# Patient Record
Sex: FEMALE | Race: WHITE | Employment: FULL TIME | URBAN - METROPOLITAN AREA
[De-identification: names, ages, dates, MRNs, and addresses within clinical notes are randomized per-mention and may not be internally consistent; named-entity substitution may affect disease eponyms.]

---

## 2015-02-26 LAB — PAP SMEAR - HIM PATIENT REPORTED: NEGATIVE

## 2017-01-31 ENCOUNTER — OFFICE VISIT (OUTPATIENT)
Dept: TRANSPLANT | Facility: CLINIC | Age: 52
End: 2017-01-31
Attending: INTERNAL MEDICINE
Payer: COMMERCIAL

## 2017-01-31 VITALS
HEART RATE: 56 BPM | RESPIRATION RATE: 18 BRPM | TEMPERATURE: 98.1 F | SYSTOLIC BLOOD PRESSURE: 129 MMHG | WEIGHT: 140 LBS | DIASTOLIC BLOOD PRESSURE: 81 MMHG | BODY MASS INDEX: 24.02 KG/M2 | OXYGEN SATURATION: 98 %

## 2017-01-31 DIAGNOSIS — D50.0 IRON DEFICIENCY ANEMIA DUE TO CHRONIC BLOOD LOSS: Primary | ICD-10-CM

## 2017-01-31 DIAGNOSIS — E55.9 VITAMIN D DEFICIENCY: ICD-10-CM

## 2017-01-31 DIAGNOSIS — D50.0 IRON DEFICIENCY ANEMIA DUE TO CHRONIC BLOOD LOSS: ICD-10-CM

## 2017-01-31 LAB
ALBUMIN SERPL-MCNC: 4.1 G/DL (ref 3.4–5)
ALP SERPL-CCNC: 73 U/L (ref 40–150)
ALT SERPL W P-5'-P-CCNC: 20 U/L (ref 0–50)
ANION GAP SERPL CALCULATED.3IONS-SCNC: 9 MMOL/L (ref 3–14)
AST SERPL W P-5'-P-CCNC: 10 U/L (ref 0–45)
BASOPHILS # BLD AUTO: 0 10E9/L (ref 0–0.2)
BASOPHILS NFR BLD AUTO: 0.4 %
BILIRUB SERPL-MCNC: 0.3 MG/DL (ref 0.2–1.3)
BUN SERPL-MCNC: 14 MG/DL (ref 7–30)
CALCIUM SERPL-MCNC: 8.5 MG/DL (ref 8.5–10.1)
CHLORIDE SERPL-SCNC: 106 MMOL/L (ref 94–109)
CO2 SERPL-SCNC: 25 MMOL/L (ref 20–32)
CREAT SERPL-MCNC: 0.62 MG/DL (ref 0.52–1.04)
DIFFERENTIAL METHOD BLD: NORMAL
EOSINOPHIL # BLD AUTO: 0.4 10E9/L (ref 0–0.7)
EOSINOPHIL NFR BLD AUTO: 7.1 %
ERYTHROCYTE [DISTWIDTH] IN BLOOD BY AUTOMATED COUNT: 12.9 % (ref 10–15)
FERRITIN SERPL-MCNC: 38 NG/ML (ref 8–252)
GFR SERPL CREATININE-BSD FRML MDRD: NORMAL ML/MIN/1.7M2
GLUCOSE SERPL-MCNC: 77 MG/DL (ref 70–99)
HCT VFR BLD AUTO: 40.4 % (ref 35–47)
HGB BLD-MCNC: 13.4 G/DL (ref 11.7–15.7)
IMM GRANULOCYTES # BLD: 0 10E9/L (ref 0–0.4)
IMM GRANULOCYTES NFR BLD: 0.2 %
IRON SATN MFR SERPL: 23 % (ref 15–46)
IRON SERPL-MCNC: 71 UG/DL (ref 35–180)
LYMPHOCYTES # BLD AUTO: 1.7 10E9/L (ref 0.8–5.3)
LYMPHOCYTES NFR BLD AUTO: 31.3 %
MCH RBC QN AUTO: 31.3 PG (ref 26.5–33)
MCHC RBC AUTO-ENTMCNC: 33.2 G/DL (ref 31.5–36.5)
MCV RBC AUTO: 94 FL (ref 78–100)
MONOCYTES # BLD AUTO: 0.4 10E9/L (ref 0–1.3)
MONOCYTES NFR BLD AUTO: 6.9 %
NEUTROPHILS # BLD AUTO: 3 10E9/L (ref 1.6–8.3)
NEUTROPHILS NFR BLD AUTO: 54.1 %
NRBC # BLD AUTO: 0 10*3/UL
NRBC BLD AUTO-RTO: 0 /100
PLATELET # BLD AUTO: 306 10E9/L (ref 150–450)
POTASSIUM SERPL-SCNC: 4 MMOL/L (ref 3.4–5.3)
PROT SERPL-MCNC: 7.4 G/DL (ref 6.8–8.8)
RBC # BLD AUTO: 4.28 10E12/L (ref 3.8–5.2)
SODIUM SERPL-SCNC: 140 MMOL/L (ref 133–144)
TIBC SERPL-MCNC: 303 UG/DL (ref 240–430)
TSH SERPL DL<=0.05 MIU/L-ACNC: 0.79 MU/L (ref 0.4–4)
WBC # BLD AUTO: 5.5 10E9/L (ref 4–11)

## 2017-01-31 PROCEDURE — 99213 OFFICE O/P EST LOW 20 MIN: CPT | Mod: ZF

## 2017-01-31 PROCEDURE — 82306 VITAMIN D 25 HYDROXY: CPT | Performed by: INTERNAL MEDICINE

## 2017-01-31 PROCEDURE — 82728 ASSAY OF FERRITIN: CPT | Performed by: INTERNAL MEDICINE

## 2017-01-31 PROCEDURE — 83540 ASSAY OF IRON: CPT | Performed by: INTERNAL MEDICINE

## 2017-01-31 PROCEDURE — 84443 ASSAY THYROID STIM HORMONE: CPT | Performed by: INTERNAL MEDICINE

## 2017-01-31 PROCEDURE — 80053 COMPREHEN METABOLIC PANEL: CPT | Performed by: INTERNAL MEDICINE

## 2017-01-31 PROCEDURE — 36415 COLL VENOUS BLD VENIPUNCTURE: CPT

## 2017-01-31 PROCEDURE — 83550 IRON BINDING TEST: CPT | Performed by: INTERNAL MEDICINE

## 2017-01-31 PROCEDURE — 85025 COMPLETE CBC W/AUTO DIFF WBC: CPT | Performed by: INTERNAL MEDICINE

## 2017-01-31 RX ORDER — METOPROLOL SUCCINATE 25 MG/1
TABLET, EXTENDED RELEASE ORAL
Refills: 0 | COMMUNITY
Start: 2016-11-23

## 2017-01-31 RX ORDER — ESCITALOPRAM OXALATE 5 MG/1
5 TABLET ORAL
COMMUNITY
End: 2021-03-12

## 2017-01-31 ASSESSMENT — PAIN SCALES - GENERAL: PAINLEVEL: NO PAIN (0)

## 2017-01-31 NOTE — PROGRESS NOTES
HISTORY OF PRESENT ILLNESS:  Ming returns to the Hematology Clinic for evaluation of refractory iron deficiency.She last received Ferrlecit iron infusions in Jan/Feb 2016 4 infusions of 125mg each.  She received it at [facility].  Last period was January 2016   She has some fatigue, no ice eating,no brittle nails and no nausea, vomiting or diarrhea. She had an ultrasound of the thyroid which showed multiple small nodules unchanged from previously Of note she did have possible radiation exposure to [place]. Now taking metoprolol 25mg/day. No change in her alopecia. Scalp .Skin is dry Not taking Vitamin D. Now taking lipitor 10mg/day.Has had some muscle pain and stopped lipitor which improved the muscle pain  Unfortunately her mother has been diagnosed with mesothelioma.She will be seeing Dr Tubbs     PAST MEDICAL HISTORY:  See my note from 06/2016.       SOCIAL HISTORY:  See my note from 06/2016.       FAMILY HISTORY:  See my note from 06/2016.       REVIEW OF SYSTEMS:  A 12-point review of systems is done and is negative as in HPI.      PHYSICAL EXAMINATION:   GENERAL:  She is an alert woman in no acute distress.   VITAL SIGNS:  Stable./81 mmHg  Pulse 56  Temp(Src) 98.1  F (36.7  C) (Oral)  Resp 18  Wt 63.504 kg (140 lb)  SpO2 98%    HEENT:  Normocephalic.  EOM okay.  No scleral icterus.  Mouth without lesion.  No angular cheilosis. Scalp not red. Part width 2mm  NECK  Thyroid feels bumpy  RESPIRATORY:  Clear to percussion and auscultation.   CARDIAC:  No S3,S4 or M  ABDOMEN:  Soft without hepatosplenomegaly.   EXTREMITIES:  Without edema.     Results for MING HOWARD (MRN [id number]) as of 2/1/2017 09:52   Ref. Range 1/31/2017 14:16   Sodium Latest Ref Range: 133-144 mmol/L 140   Potassium Latest Ref Range: 3.4-5.3 mmol/L 4.0   Chloride Latest Ref Range:  mmol/L 106   Carbon Dioxide Latest Ref Range: 20-32 mmol/L 25   Urea Nitrogen Latest Ref Range: 7-30 mg/dL 14   Creatinine  Latest Ref Range: 0.52-1.04 mg/dL 0.62   GFR Estimate Latest Ref Range: >60 mL/min/1.7m2 >90...   GFR Estimate If Black Latest Ref Range: >60 mL/min/1.7m2 >90...   Calcium Latest Ref Range: 8.5-10.1 mg/dL 8.5   Anion Gap Latest Ref Range: 3-14 mmol/L 9   Albumin Latest Ref Range: 3.4-5.0 g/dL 4.1   Protein Total Latest Ref Range: 6.8-8.8 g/dL 7.4   Bilirubin Total Latest Ref Range: 0.2-1.3 mg/dL 0.3   Alkaline Phosphatase Latest Ref Range:  U/L 73   ALT Latest Ref Range: 0-50 U/L 20   AST Latest Ref Range: 0-45 U/L 10   Ferritin Latest Ref Range: 8-252 ng/mL 38   Iron Latest Ref Range:  ug/dL 71   Iron Binding Cap Latest Ref Range: 240-430 ug/dL 303   Iron Saturation Index Latest Ref Range: 15-46 % 23   TSH Latest Ref Range: 0.40-4.00 mU/L 0.79   Glucose Latest Ref Range: 70-99 mg/dL 77   WBC Latest Ref Range: 4.0-11.0 10e9/L 5.5   Hemoglobin Latest Ref Range: 11.7-15.7 g/dL 13.4   Hematocrit Latest Ref Range: 35.0-47.0 % 40.4   Platelet Count Latest Ref Range: 150-450 10e9/L 306   RBC Count Latest Ref Range: 3.8-5.2 10e12/L 4.28   MCV Latest Ref Range:  fl 94   MCH Latest Ref Range: 26.5-33.0 pg 31.3   MCHC Latest Ref Range: 31.5-36.5 g/dL 33.2   RDW Latest Ref Range: 10.0-15.0 % 12.9   Diff Method Unknown Automated Method   % Neutrophils Latest Units: % 54.1   % Lymphocytes Latest Units: % 31.3   % Monocytes Latest Units: % 6.9   % Eosinophils Latest Units: % 7.1   % Basophils Latest Units: % 0.4   % Immature Granulocytes Latest Units: % 0.2   Nucleated RBCs Latest Ref Range: 0 /100 0   Absolute Neutrophil Latest Ref Range: 1.6-8.3 10e9/L 3.0   Absolute Lymphocytes Latest Ref Range: 0.8-5.3 10e9/L 1.7   Absolute Monocytes Latest Ref Range: 0.0-1.3 10e9/L 0.4   Absolute Eosinophils Latest Ref Range: 0.0-0.7 10e9/L 0.4   Absolute Basophils Latest Ref Range: 0.0-0.2 10e9/L 0.0   Abs Immature Granulocytes Latest Ref Range: 0-0.4 10e9/L 0.0   Absolute Nucleated RBC Unknown 0.0         ASSESSMENT:   1.   Iron deficiency.   2.  History of hyperlipidemia.   3.  History of radiation exposure.   4.  History of menorrhagia.   5.  Vitamin D deficiency.     6. Thyroid nodules  7.Empty sella syndrome     Carolyn seems to be doing very well. Tolerated iron infusions well.   Her hemoglobin looks normal.  Her MCV is normal. Ferritin is 38 .No need for iron infusion now  Will follow up with endocrine for thyroid evaluation. I will see her again in 6 months' time.  Alopecia seems stable I will see her again in 07/2017. Vitamin D still low, needs to continue replacement Should take lipitor and f/u cardiologist

## 2017-01-31 NOTE — NURSING NOTE
Chief Complaint   Patient presents with     Blood Draw     labs collected by RN from left antecubital, pt tolerated well. Vitals taken and pt checked in for next appt.     Pauline Logan

## 2017-01-31 NOTE — Clinical Note
1/31/2017      RE: Ming Briones  [address] [place] [address]       HISTORY OF PRESENT ILLNESS:  Ming returns to the Hematology Clinic for evaluation of refractory iron deficiency.She last received Ferrlecit iron infusions in Jan/Feb 2016 4 infusions of 125mg each.  She received it at [facility].  Last period was January 2016   She has some fatigue, no ice eating,no brittle nails and no nausea, vomiting or diarrhea. She had an ultrasound of the thyroid which showed multiple small nodules unchanged from previously Of note she did have possible radiation exposure to [place]. Now taking metoprolol 25mg/day. No change in her alopecia. Scalp .Skin is dry Not taking Vitamin D. Now taking lipitor 10mg/day.Has had some muscle pain and stopped lipitor which improved the muscle pain  Unfortunately her mother has been diagnosed with mesothelioma.She will be seeing Dr Tubbs     PAST MEDICAL HISTORY:  See my note from 06/2016.       SOCIAL HISTORY:  See my note from 06/2016.       FAMILY HISTORY:  See my note from 06/2016.       REVIEW OF SYSTEMS:  A 12-point review of systems is done and is negative as in HPI.      PHYSICAL EXAMINATION:   GENERAL:  She is an alert woman in no acute distress.   VITAL SIGNS:  Stable./81 mmHg  Pulse 56  Temp(Src) 98.1  F (36.7  C) (Oral)  Resp 18  Wt 63.504 kg (140 lb)  SpO2 98%    HEENT:  Normocephalic.  EOM okay.  No scleral icterus.  Mouth without lesion.  No angular cheilosis. Scalp not red. Part width 2mm  NECK  Thyroid feels bumpy  RESPIRATORY:  Clear to percussion and auscultation.   CARDIAC:  No S3,S4 or M  ABDOMEN:  Soft without hepatosplenomegaly.   EXTREMITIES:  Without edema.     Results for MING BRIONES (MRN [id number]) as of 2/1/2017 09:52   Ref. Range 1/31/2017 14:16   Sodium Latest Ref Range: 133-144 mmol/L 140   Potassium Latest Ref Range: 3.4-5.3 mmol/L 4.0   Chloride Latest Ref Range:  mmol/L 106   Carbon Dioxide Latest Ref  Range: 20-32 mmol/L 25   Urea Nitrogen Latest Ref Range: 7-30 mg/dL 14   Creatinine Latest Ref Range: 0.52-1.04 mg/dL 0.62   GFR Estimate Latest Ref Range: >60 mL/min/1.7m2 >90...   GFR Estimate If Black Latest Ref Range: >60 mL/min/1.7m2 >90...   Calcium Latest Ref Range: 8.5-10.1 mg/dL 8.5   Anion Gap Latest Ref Range: 3-14 mmol/L 9   Albumin Latest Ref Range: 3.4-5.0 g/dL 4.1   Protein Total Latest Ref Range: 6.8-8.8 g/dL 7.4   Bilirubin Total Latest Ref Range: 0.2-1.3 mg/dL 0.3   Alkaline Phosphatase Latest Ref Range:  U/L 73   ALT Latest Ref Range: 0-50 U/L 20   AST Latest Ref Range: 0-45 U/L 10   Ferritin Latest Ref Range: 8-252 ng/mL 38   Iron Latest Ref Range:  ug/dL 71   Iron Binding Cap Latest Ref Range: 240-430 ug/dL 303   Iron Saturation Index Latest Ref Range: 15-46 % 23   TSH Latest Ref Range: 0.40-4.00 mU/L 0.79   Glucose Latest Ref Range: 70-99 mg/dL 77   WBC Latest Ref Range: 4.0-11.0 10e9/L 5.5   Hemoglobin Latest Ref Range: 11.7-15.7 g/dL 13.4   Hematocrit Latest Ref Range: 35.0-47.0 % 40.4   Platelet Count Latest Ref Range: 150-450 10e9/L 306   RBC Count Latest Ref Range: 3.8-5.2 10e12/L 4.28   MCV Latest Ref Range:  fl 94   MCH Latest Ref Range: 26.5-33.0 pg 31.3   MCHC Latest Ref Range: 31.5-36.5 g/dL 33.2   RDW Latest Ref Range: 10.0-15.0 % 12.9   Diff Method Unknown Automated Method   % Neutrophils Latest Units: % 54.1   % Lymphocytes Latest Units: % 31.3   % Monocytes Latest Units: % 6.9   % Eosinophils Latest Units: % 7.1   % Basophils Latest Units: % 0.4   % Immature Granulocytes Latest Units: % 0.2   Nucleated RBCs Latest Ref Range: 0 /100 0   Absolute Neutrophil Latest Ref Range: 1.6-8.3 10e9/L 3.0   Absolute Lymphocytes Latest Ref Range: 0.8-5.3 10e9/L 1.7   Absolute Monocytes Latest Ref Range: 0.0-1.3 10e9/L 0.4   Absolute Eosinophils Latest Ref Range: 0.0-0.7 10e9/L 0.4   Absolute Basophils Latest Ref Range: 0.0-0.2 10e9/L 0.0   Abs Immature Granulocytes Latest Ref  Range: 0-0.4 10e9/L 0.0   Absolute Nucleated RBC Unknown 0.0         ASSESSMENT:   1.  Iron deficiency.   2.  History of hyperlipidemia.   3.  History of radiation exposure.   4.  History of menorrhagia.   5.  Vitamin D deficiency.     6. Thyroid nodules  7.Empty sella syndrome     Carolyn seems to be doing very well. Tolerated iron infusions well.   Her hemoglobin looks normal.  Her MCV is normal. Ferritin is 38 .No need for iron infusion now  Will follow up with endocrine for thyroid evaluation. I will see her again in 6 months' time.  Alopecia seems stable I will see her again in 07/2017. Vitamin D still low, needs to continue replacement Should take lipitor and f/u cardiologist      Wilfredo Ruggiero MD

## 2017-01-31 NOTE — Clinical Note
1/31/2017       RE: Ming Briones  [address] [place] [address]     Dear Colleague,    Thank you for referring your patient, Ming Briones, to the [facility] BLOOD AND MARROW TRANSPLANT at [facility]. Please see a copy of my visit note below.    HISTORY OF PRESENT ILLNESS:  Ming returns to the Hematology Clinic for evaluation of refractory iron deficiency.She last received Ferrlecit iron infusions in Jan/Feb 2016 4 infusions of 125mg each.  She received it at [facility].  Last period was January 2016   She has some fatigue, no ice eating,no brittle nails and no nausea, vomiting or diarrhea. She had an ultrasound of the thyroid which showed multiple small nodules unchanged from previously Of note she did have possible radiation exposure to [place]. Now taking metoprolol 25mg/day. No change in her alopecia. Scalp .Skin is dry Not taking Vitamin D. Now taking lipitor 10mg/day.Has had some muscle pain and stopped lipitor which improved the muscle pain  Unfortunately her mother has been diagnosed with mesothelioma.She will be seeing Dr Tubbs     PAST MEDICAL HISTORY:  See my note from 06/2016.       SOCIAL HISTORY:  See my note from 06/2016.       FAMILY HISTORY:  See my note from 06/2016.       REVIEW OF SYSTEMS:  A 12-point review of systems is done and is negative as in HPI.      PHYSICAL EXAMINATION:   GENERAL:  She is an alert woman in no acute distress.   VITAL SIGNS:  Stable./81 mmHg  Pulse 56  Temp(Src) 98.1  F (36.7  C) (Oral)  Resp 18  Wt 63.504 kg (140 lb)  SpO2 98%    HEENT:  Normocephalic.  EOM okay.  No scleral icterus.  Mouth without lesion.  No angular cheilosis. Scalp not red. Part width 2mm  NECK  Thyroid feels bumpy  RESPIRATORY:  Clear to percussion and auscultation.   CARDIAC:  No S3,S4 or M  ABDOMEN:  Soft without hepatosplenomegaly.   EXTREMITIES:  Without edema.     Results for MING BRIONES (MRN [id number]) as of  2/1/2017 09:52   Ref. Range 1/31/2017 14:16   Sodium Latest Ref Range: 133-144 mmol/L 140   Potassium Latest Ref Range: 3.4-5.3 mmol/L 4.0   Chloride Latest Ref Range:  mmol/L 106   Carbon Dioxide Latest Ref Range: 20-32 mmol/L 25   Urea Nitrogen Latest Ref Range: 7-30 mg/dL 14   Creatinine Latest Ref Range: 0.52-1.04 mg/dL 0.62   GFR Estimate Latest Ref Range: >60 mL/min/1.7m2 >90...   GFR Estimate If Black Latest Ref Range: >60 mL/min/1.7m2 >90...   Calcium Latest Ref Range: 8.5-10.1 mg/dL 8.5   Anion Gap Latest Ref Range: 3-14 mmol/L 9   Albumin Latest Ref Range: 3.4-5.0 g/dL 4.1   Protein Total Latest Ref Range: 6.8-8.8 g/dL 7.4   Bilirubin Total Latest Ref Range: 0.2-1.3 mg/dL 0.3   Alkaline Phosphatase Latest Ref Range:  U/L 73   ALT Latest Ref Range: 0-50 U/L 20   AST Latest Ref Range: 0-45 U/L 10   Ferritin Latest Ref Range: 8-252 ng/mL 38   Iron Latest Ref Range:  ug/dL 71   Iron Binding Cap Latest Ref Range: 240-430 ug/dL 303   Iron Saturation Index Latest Ref Range: 15-46 % 23   TSH Latest Ref Range: 0.40-4.00 mU/L 0.79   Glucose Latest Ref Range: 70-99 mg/dL 77   WBC Latest Ref Range: 4.0-11.0 10e9/L 5.5   Hemoglobin Latest Ref Range: 11.7-15.7 g/dL 13.4   Hematocrit Latest Ref Range: 35.0-47.0 % 40.4   Platelet Count Latest Ref Range: 150-450 10e9/L 306   RBC Count Latest Ref Range: 3.8-5.2 10e12/L 4.28   MCV Latest Ref Range:  fl 94   MCH Latest Ref Range: 26.5-33.0 pg 31.3   MCHC Latest Ref Range: 31.5-36.5 g/dL 33.2   RDW Latest Ref Range: 10.0-15.0 % 12.9   Diff Method Unknown Automated Method   % Neutrophils Latest Units: % 54.1   % Lymphocytes Latest Units: % 31.3   % Monocytes Latest Units: % 6.9   % Eosinophils Latest Units: % 7.1   % Basophils Latest Units: % 0.4   % Immature Granulocytes Latest Units: % 0.2   Nucleated RBCs Latest Ref Range: 0 /100 0   Absolute Neutrophil Latest Ref Range: 1.6-8.3 10e9/L 3.0   Absolute Lymphocytes Latest Ref Range: 0.8-5.3 10e9/L 1.7    Absolute Monocytes Latest Ref Range: 0.0-1.3 10e9/L 0.4   Absolute Eosinophils Latest Ref Range: 0.0-0.7 10e9/L 0.4   Absolute Basophils Latest Ref Range: 0.0-0.2 10e9/L 0.0   Abs Immature Granulocytes Latest Ref Range: 0-0.4 10e9/L 0.0   Absolute Nucleated RBC Unknown 0.0         ASSESSMENT:   1.  Iron deficiency.   2.  History of hyperlipidemia.   3.  History of radiation exposure.   4.  History of menorrhagia.   5.  Vitamin D deficiency.     6. Thyroid nodules  7.Empty sella syndrome     Carolyn seems to be doing very well. Tolerated iron infusions well.   Her hemoglobin looks normal.  Her MCV is normal. Ferritin is 38 .No need for iron infusion now  Will follow up with endocrine for thyroid evaluation. I will see her again in 6 months' time.  Alopecia seems stable I will see her again in 07/2017. Vitamin D still low, needs to continue replacement Should take lipitor and f/u cardiologist        Again, thank you for allowing me to participate in the care of your patient.      Sincerely,    Wilfredo Ruggiero MD

## 2017-01-31 NOTE — NURSING NOTE
BMT Heme Malignancy Rooming Note    Carolyn Briones - 1/31/2017 2:28 PM     Chief Complaint   Patient presents with     Blood Draw     labs collected by RN from left antecubital, pt tolerated well. Vitals taken and pt checked in for next appt.     RECHECK     Anemia~ here for provider visit        /81 mmHg  Pulse 56  Temp(Src) 98.1  F (36.7  C) (Oral)  Resp 18  Wt 63.504 kg (140 lb)  SpO2 98%     Medications reviewed: Yes    Labs drawn: Yes    Drawn by: Clinic Staff  Via: venipuncture  See Doc Flowsheet for details.      Dressing changed: Not applicable     Medications given: No    Staff time:8    Additional information if applicable: Patient offers no complaints    Vanessa Valenzuela RN

## 2017-02-01 LAB — DEPRECATED CALCIDIOL+CALCIFEROL SERPL-MC: 14 UG/L (ref 20–75)

## 2017-04-25 ENCOUNTER — HOSPITAL ENCOUNTER (OUTPATIENT)
Dept: MAMMOGRAPHY | Facility: CLINIC | Age: 52
Discharge: HOME | End: 2017-04-25
Attending: INTERNAL MEDICINE | Admitting: INTERNAL MEDICINE
Payer: COMMERCIAL

## 2017-04-25 DIAGNOSIS — Z12.31 VISIT FOR SCREENING MAMMOGRAM: ICD-10-CM

## 2017-04-25 PROCEDURE — 77063 BREAST TOMOSYNTHESIS BI: CPT

## 2017-06-09 ENCOUNTER — OFFICE VISIT (OUTPATIENT)
Dept: ENDOCRINOLOGY | Facility: CLINIC | Age: 52
End: 2017-06-09

## 2017-06-09 ENCOUNTER — TELEPHONE (OUTPATIENT)
Dept: ENDOCRINOLOGY | Facility: CLINIC | Age: 52
End: 2017-06-09

## 2017-06-09 VITALS
HEART RATE: 71 BPM | SYSTOLIC BLOOD PRESSURE: 109 MMHG | DIASTOLIC BLOOD PRESSURE: 74 MMHG | HEIGHT: 64 IN | WEIGHT: 146.6 LBS | BODY MASS INDEX: 25.03 KG/M2

## 2017-06-09 DIAGNOSIS — M54.2 NECK PAIN: Primary | ICD-10-CM

## 2017-06-09 DIAGNOSIS — E04.1 THYROID NODULE: ICD-10-CM

## 2017-06-09 DIAGNOSIS — F43.9 STRESS AT HOME: ICD-10-CM

## 2017-06-09 NOTE — TELEPHONE ENCOUNTER
Dada scheduled to see Dr Fink today 11:30 AM She will be  In the building  with her mother for a 11 AM Oncology.
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
## 2017-06-09 NOTE — NURSING NOTE
"Chief Complaint   Patient presents with     RECHECK     THYROID NODULE F/U        Initial /74 (BP Location: Right arm, Patient Position: Chair, Cuff Size: Adult Regular)  Pulse 71  Ht 1.626 m (5' 4\")  Wt 66.5 kg (146 lb 9.6 oz)  BMI 25.16 kg/m2 Estimated body mass index is 25.16 kg/(m^2) as calculated from the following:    Height as of this encounter: 1.626 m (5' 4\").    Weight as of this encounter: 66.5 kg (146 lb 9.6 oz).  Medication Reconciliation: complete       Joanne Carr CMA     "

## 2017-06-09 NOTE — MR AVS SNAPSHOT
After Visit Summary   6/9/2017    Carolyn Briones    MRN: [id number]           Patient Information     Date Of Birth          1965        Visit Information        Provider Department      6/9/2017 11:30 AM Cierra Fink MD [facility]        Care Instructions    To expedite your medication refill(s), please contact your pharmacy and have them fax a refill request to: [phone].  *Please allow 3 business days for routine medication refills.  *Please allow 5 business days for controlled substance medication refills.  --------------------  For scheduling appointments (including lab work), please request an appointment through [employer], or call: [phone].    For questions for your provider or the endocrine nurse, please send a [employer] message.  For after-hours urgent issues, please dial [phone], and ask to speak with the Endocrinologist On-Call.  --------------------  Please Note: If you are active on [employer], all future test results will be sent by [employer] message only and will no longer be sent by mail. You may also receive communication directly from your physician.            Follow-ups after your visit        Follow-up notes from your care team     Return if symptoms worsen or fail to improve.      Your next 10 appointments already scheduled     Aug 01, 2017  1:00 PM CDT   [facility] with  [facility]   [facility] Draw ([facility])    [address] [address] [phone]            Aug 01, 2017  1:30 PM CDT   RETURN ONC with Wilfredo Ruggiero MD   [facility] Blood and Marrow Transplant ([facility])    [address] [address] [phone]              Who to contact     Please call your clinic at [phone] to:    Ask questions about your health    Make or cancel appointments    Discuss your medicines    Learn about  "your test results    Speak to your doctor   If you have compliments or concerns about an experience at your clinic, or if you wish to file a complaint, please contact [facility] Patient Relations at [phone] or email us at [email or web address]         Additional Information About Your Visit        [employer] Information     [employer] gives you secure access to your electronic health record. If you see a primary care provider, you can also send messages to your care team and make appointments. If you have questions, please call your primary care clinic.  If you do not have a primary care provider, please call [phone] and they will assist you.      [employer] is an electronic gateway that provides easy, online access to your medical records. With [employer], you can request a clinic appointment, read your test results, renew a prescription or communicate with your care team.     To access your existing account, please contact your [facility] or call [phone] for assistance.        Care EveryWhere ID     This is your Care EveryWhere ID. This could be used by other organizations to access your [facility] medical records  [id number]        Your Vitals Were     Pulse Height BMI (Body Mass Index)             71 1.626 m (5' 4\") 25.16 kg/m2          Blood Pressure from Last 3 Encounters:   06/09/17 109/74   01/31/17 129/81   10/06/16 105/75    Weight from Last 3 Encounters:   06/09/17 66.5 kg (146 lb 9.6 oz)   01/31/17 63.5 kg (140 lb)   09/27/16 64 kg (141 lb)              Today, you had the following     No orders found for display         Today's Medication Changes          These changes are accurate as of: 6/9/17 12:13 PM.  If you have any questions, ask your nurse or doctor.               Stop taking these medicines if you haven't already. Please contact your care team if you have questions.     metoprolol 25 MG tablet   Commonly known as:  LOPRESSOR "   Stopped by:  Cierra Fink MD                    Primary Care Provider Office Phone # Fax #    Gentry Baer [phone]       [address] [address]        Thank you!     Thank you for choosing [facility]  for your care. Our goal is always to provide you with excellent care. Hearing back from our patients is one way we can continue to improve our services. Please take a few minutes to complete the written survey that you may receive in the mail after your visit with us. Thank you!             Your Updated Medication List - Protect others around you: Learn how to safely use, store and throw away your medicines at www.disposemymeds.org.          This list is accurate as of: 6/9/17 12:13 PM.  Always use your most recent med list.                   Brand Name Dispense Instructions for use    atorvastatin 10 MG tablet    LIPITOR     Take 20 mg by mouth daily       escitalopram 5 MG tablet    LEXAPRO     Take 5 mg by mouth       metoprolol 25 MG 24 hr tablet    TOPROL-XL     take 1/2 tablet by mouth once daily       MULTI-VITAMINS PO      Take  by mouth daily.       PREVACID 30 MG CR capsule   Generic drug:  LANsoprazole      1 CAPSULE DAILY BEFORE EATING

## 2017-06-09 NOTE — PROGRESS NOTES
Assessment   1.   Neck pain intermittent symptoms. I am not finding anything of concern , relative to the thyroid or neck anatomy, to explain the symptoms. Reassurance.      Stress- reassurance    Nodular thyroid in patient with history of possibly important radiation exposures before age 21- stable subcm nodules on past imaging.       History of radiation exposure -[place] exposure while living in [facility] in 1986.    Partially empty sella by imaging. This may be a normal variant.  Pituitary function has repeatedly tested normal.     Hypovitaminosis D 1/17 - not addressed.     Cierra Fink MD    HPI   Carolyn presents for semi-urgent follow up due to recent neck concerns.  We follow her chronically for subcm thyroid nodules . Today she notes concern for intermittent sensations in the neck, very mild, but possibly reminiscent of past FNAB.  She also notes other have asked about her thyroid and she also wondered if it looked larger in the mirror. She is concerned something is going on in the neck. She has no swallow or voice symtpoms.   Carolyn has a history of nodular thyroid with 6-7 mm hypoechoic nodule on the right, with benign cytology by past US guided FNAB  (performed here last 1/12). She has a history of [place] exposure at age 19-20.She was living in [facility]  [place] at the time of the [place] nuclear accident in [place] While [facility] was not directly affected, it was contaminated, including contamination of the water/ soil, etc. She was age 20 and continued to live there another 4.5 years. She has had past thyroid nodule FNAB in 2005 at [place] - benign by history (we do not have the actual cytology report).     We have the following Tg data  2/6/14: Tg 8.1, VIDHI < 0.4, TSH 0.94  9/27/16: Tg 9.9, VIDHI < 0.4, THS 1.29- this followed the appt  She last had TSH 0.79 on 1/31/17.  Vitamin D was 14 then.     2005 Brain MRI images  support the diagnosis of possible partially empty sella.  She last had  formal thyroid US 10/27/16 - I have reviewed the images today on PACS  (see exam)    ROS   Weight is up  Sleep is not OK  Neck sensations reminiscent of FNAB procedure - she has had a few episodes, intermittent  People have asked if she has enlarged thyroid  Palpitations  GI: needs to use the prilosec  LMP 1/16    Family Hx   Family History   Problem Relation Age of Onset     Lipids Mother      C.A.D. Father      CABG age 65     Thyroid Disease No family hx of      Thyroid Cancer No family hx of      Personal Hx   Behavioral history: No tobacco use.   Home environment: No secondhand tobacco smoke in home.   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 [employer]   Past Medical History:   Diagnosis Date     Anemia      Empty sella (H)     only partially empty; possibly normal vaiant     Exposure to other ionizing radiation, subsequent encounter 1986    [place]     GERD (gastroesophageal reflux disease)      Hypercholesterolemia      Hypertension      Iron deficiency      Multiple thyroid nodules     subcm     Past Surgical History:   Procedure Laterality Date     APPENDECTOMY       COLONOSCOPY       DILATION AND CURETTAGE SUCTION  11/13/2012    Procedure: DILATION AND CURETTAGE SUCTION;  Suction Dilation & Curettage    (8 weeks);  Surgeon: Ken Rose MD;  [facility]: [facility]     EXCISE GANGLION WRIST  4/10/2014    Procedure: EXCISE GANGLION WRIST;  Excision Ganglion Cyst, Left Wrist;  Surgeon: Ashley Garcia MD;  [facility]     OPERATIVE HYSTEROSCOPY WITH MORCELLATOR  1/19/2012    Procedure:OPERATIVE HYSTEROSCOPY WITH MORCELLATOR; Hysteroscopy & Polypectomy With Morcellator; Surgeon:KEN ROSE; Location:[facility]       Past Surgical History:   Procedure Laterality Date     APPENDECTOMY       COLONOSCOPY       DILATION AND CURETTAGE SUCTION  11/13/2012    Procedure:  "DILATION AND CURETTAGE SUCTION;  Suction Dilation & Curettage    (8 weeks);  Surgeon: Ken Rose MD;  [facility]     EXCISE GANGLION WRIST  4/10/2014    Procedure: EXCISE GANGLION WRIST;  Excision Ganglion Cyst, Left Wrist;  Surgeon: Ashley Garcia MD;  [facility]     OPERATIVE HYSTEROSCOPY WITH MORCELLATOR  1/19/2012    Procedure:OPERATIVE HYSTEROSCOPY WITH MORCELLATOR; Hysteroscopy & Polypectomy With Morcellator; Surgeon:KEN ROSE; [facility]       Current Outpatient Prescriptions   Medication Sig Dispense Refill     escitalopram (LEXAPRO) 5 MG tablet Take 5 mg by mouth       metoprolol (TOPROL-XL) 25 MG 24 hr tablet take 1/2 tablet by mouth once daily  0     atorvastatin (LIPITOR) 10 MG tablet Take 20 mg by mouth daily        Multiple Vitamin (MULTI-VITAMINS PO) Take  by mouth daily.       PREVACID 30 MG OR CPDR 1 CAPSULE DAILY BEFORE EATING       Physical Exam   GENERAL: middle aged woman in NAD   /74 (BP Location: Right arm, Patient Position: Chair, Cuff Size: Adult Regular)  Pulse 71  Ht 1.626 m (5' 4\")  Wt 66.5 kg (146 lb 9.6 oz)  BMI 25.16 kg/m2  SKIN: normal color, temperature  HEENT: PER, no scleral icterus, eyelid retraction, stare, lid lag, or conjunctival injection.   NECK: No visible neck masses; Thyroid is full - fills sternal notch area - not well defined.  No palpable nodules. I have performed real time thyroid and neck US. There are no abnormal cervical lymph nodes. The thyroid has a few subcm nodules  Right # 1 dominant anterior  Is 0.8 x 0.5 x 1 (was 0.8 x 0.4 x 0.8)  Right # 2 2nd largest medial inferior is 0.3 x 0.3 x 0.5 (was 0.5 x 0.3 x 0.5)  Smaller nodules were not seen. The background thyroid is homogeneous and the same shape as seen on past imaging.  LUNGS: clear bilaterally  CARDIAC: RRR S1 S2  NEURO: Alert, responds appropriately to questions,  moves all extremities,gait normal; no tremor    DATA Review:    Results for MING HOWARD (MRN " [id number]) as of 6/9/2017 12:14   Ref. Range 1/31/2017 14:16   Sodium Latest Ref Range: 133 - 144 mmol/L 140   Potassium Latest Ref Range: 3.4 - 5.3 mmol/L 4.0   Chloride Latest Ref Range: 94 - 109 mmol/L 106   Carbon Dioxide Latest Ref Range: 20 - 32 mmol/L 25   Urea Nitrogen Latest Ref Range: 7 - 30 mg/dL 14   Creatinine Latest Ref Range: 0.52 - 1.04 mg/dL 0.62   GFR Estimate Latest Ref Range: >60 mL/min/1.7m2 >90...   GFR Estimate If Black Latest Ref Range: >60 mL/min/1.7m2 >90...   Calcium Latest Ref Range: 8.5 - 10.1 mg/dL 8.5   Anion Gap Latest Ref Range: 3 - 14 mmol/L 9   Albumin Latest Ref Range: 3.4 - 5.0 g/dL 4.1   Protein Total Latest Ref Range: 6.8 - 8.8 g/dL 7.4   Bilirubin Total Latest Ref Range: 0.2 - 1.3 mg/dL 0.3   Alkaline Phosphatase Latest Ref Range: 40 - 150 U/L 73   ALT Latest Ref Range: 0 - 50 U/L 20   AST Latest Ref Range: 0 - 45 U/L 10   Ferritin Latest Ref Range: 8 - 252 ng/mL 38   Iron Latest Ref Range: 35 - 180 ug/dL 71   Iron Binding Cap Latest Ref Range: 240 - 430 ug/dL 303   Iron Saturation Index Latest Ref Range: 15 - 46 % 23   TSH Latest Ref Range: 0.40 - 4.00 mU/L 0.79   Vitamin D Deficiency screening Latest Ref Range: 20 - 75 ug/L 14 (L)

## 2017-06-09 NOTE — PATIENT INSTRUCTIONS
To expedite your medication refill(s), please contact your pharmacy and have them fax a refill request to: [phone].  *Please allow 3 business days for routine medication refills.  *Please allow 5 business days for controlled substance medication refills.  --------------------  For scheduling appointments (including lab work), please request an appointment through [employer], or call: [phone].    For questions for your provider or the endocrine nurse, please send a [employer] message.  For after-hours urgent issues, please dial [phone], and ask to speak with the Endocrinologist On-Call.  --------------------  Please Note: If you are active on [employer], all future test results will be sent by [employer] message only and will no longer be sent by mail. You may also receive communication directly from your physician.

## 2017-06-09 NOTE — TELEPHONE ENCOUNTER
"----- Message from Cierra Fink MD sent at 6/8/2017  7:30 PM CDT -----  Regarding: RE: message sent  6/2/  Contact: [phone]  There is an opening on my schedule at 1130 tomorrow .   Can she come at 1130 Friday 6/9?    ----- Message -----     From: Suzan Gutierres RN     Sent: 6/8/2017   1:31 PM       To: Cierra Fink MD  Subject: message sent  6/2/                               Leaves Tuesday for  6 weeks  wanted  enlarged neck looked at before  leaving.  No openings . Should she  Have imaging done or see PCP  ? What do you recommend.?  ----- Message -----     From: Suzan Gutierres RN     Sent: 6/2/2017   2:40 PM       To: Cierra Fink MD    Carolyn leaves  [place]  6[place]13/17. She has been experiencing  strange sensations \" something like having a needle biopsy  feeling that comes and goes\" others comment that it looks enlarged.   She wants to be seen next week. CAn she be seen in thyroid nodule clinic ?         "

## 2017-08-01 DIAGNOSIS — E55.9 VITAMIN D DEFICIENCY: ICD-10-CM

## 2017-08-01 DIAGNOSIS — D50.0 IRON DEFICIENCY ANEMIA DUE TO CHRONIC BLOOD LOSS: Primary | ICD-10-CM

## 2017-08-05 ENCOUNTER — HEALTH MAINTENANCE LETTER (OUTPATIENT)
Age: 52
End: 2017-08-05

## 2017-10-05 ENCOUNTER — TRANSFERRED RECORDS (OUTPATIENT)
Dept: HEALTH INFORMATION MANAGEMENT | Facility: CLINIC | Age: 52
End: 2017-10-05

## 2017-10-05 LAB — PHQ9 SCORE: 1

## 2017-10-13 ENCOUNTER — OFFICE VISIT (OUTPATIENT)
Dept: ORTHOPEDICS | Facility: CLINIC | Age: 52
End: 2017-10-13
Payer: COMMERCIAL

## 2017-10-13 VITALS — HEART RATE: 83 BPM | OXYGEN SATURATION: 96 % | DIASTOLIC BLOOD PRESSURE: 70 MMHG | SYSTOLIC BLOOD PRESSURE: 110 MMHG

## 2017-10-13 DIAGNOSIS — M54.50 ACUTE MIDLINE LOW BACK PAIN WITHOUT SCIATICA: Primary | ICD-10-CM

## 2017-10-13 PROCEDURE — 99213 OFFICE O/P EST LOW 20 MIN: CPT | Performed by: FAMILY MEDICINE

## 2017-10-13 ASSESSMENT — PAIN SCALES - GENERAL: PAINLEVEL: MODERATE PAIN (4)

## 2017-10-13 NOTE — NURSING NOTE
"Carolyn Anselmo's goals for this visit include: Evaluate and treat bilateral hips and lumbar pain  She requests these members of her care team be copied on today's visit information: yes    PCP: Gentry Baer    Referring Provider:  Gentry Baer MD  [facility]  [address]  [place] [address]    Chief Complaint   Patient presents with     Consult     Pain x 2 weeks     Hip right     Hip left     Lumbar/SI       Initial /70 (BP Location: Right arm, Patient Position: Chair, Cuff Size: Adult Regular)  Pulse 83  SpO2 96% Estimated body mass index is 25.16 kg/(m^2) as calculated from the following:    Height as of 6/9/17: 1.626 m (5' 4\").    Weight as of 6/9/17: 66.5 kg (146 lb 9.6 oz).  Medication Reconciliation: complete    "

## 2017-10-13 NOTE — MR AVS SNAPSHOT
After Visit Summary   10/13/2017    Carolyn Briones    MRN: [id number]           Patient Information     Date Of Birth          1965        Visit Information        Provider Department      10/13/2017 10:40 AM Artur Thomas, DO [facility]        Today's Diagnoses     Acute midline low back pain without sciatica    -  1      Care Instructions    Thanks for coming today.  Or[facility] Medicine Clinic  [address] [place] [address]    To schedule future appointments in Ortho Clinic, you may call [phone].    To schedule ordered imaging by your provider:   Call Central Imaging [phone]    To schedule an injection ordered by your provider:  Call [facility] Injection scheduling line: [phone]  [employer] available online at:  [employer]    Please call if any further questions or concerns ([phone]).  Clinic hours 8 am to 5 pm.    Return to clinic (call) if symptoms worsen or fail to improve.            Follow-ups after your visit        Who to contact     If you have questions or need follow up information about today's clinic visit or your schedule please contact [facility] directly at [phone].  Normal or non-critical lab and imaging results will be communicated to you by MyChart, letter or phone within 4 business days after the clinic has received the results. If you do not hear from us within 7 days, please contact the clinic through [employer] or phone. If you have a critical or abnormal lab result, we will notify you by phone as soon as possible.  Submit refill requests through [email or web address] or call your pharmacy and they will forward the refill request to us. Please allow 3 business days for your refill to be completed.          Additional Information About Your Visit        MyChart Information     [employer] gives you secure access to your electronic health record. If you see a primary care provider, you can  also send messages to your care team and make appointments. If you have questions, please call your primary care clinic.  If you do not have a primary care provider, please call [phone] and they will assist you.      [employer] is an electronic gateway that provides easy, online access to your medical records. With [employer], you can request a clinic appointment, read your test results, renew a prescription or communicate with your care team.     To access your existing account, please contact your [facility] or call [phone] for assistance.        Care EveryWhere ID     This is your Care EveryWhere ID. This could be used by other organizations to access your [facility] medical records  [id number]        Your Vitals Were     Pulse Pulse Oximetry                83 96%           Blood Pressure from Last 3 Encounters:   10/13/17 110/70   06/09/17 109/74   01/31/17 129/81    Weight from Last 3 Encounters:   06/09/17 66.5 kg (146 lb 9.6 oz)   01/31/17 63.5 kg (140 lb)   09/27/16 64 kg (141 lb)              Today, you had the following     No orders found for display       Primary Care Provider Office Phone # Fax #    Gentry Baer [phone]       [place] [address]        Equal Access to Services     KYLE BECERRA : Hadii aad ku hadasho Soomaali, waaxda luqadaha, qaybta kaalmada adeegyada, waxay idiin hayfahadn rahul winters. So [facility] [phone].    ATENCIÓN: Si habla español, tiene a lacey disposición servicios gratuitos de asistencia lingüística. Llame al [phone].    We comply with applicable federal civil rights laws and [place] laws. We do not discriminate on the basis of race, color, national origin, age, disability, sex, sexual orientation, or gender identity.            Thank you!     Thank you for choosing [facility]  for your care. Our goal is always to provide you with excellent care. Hearing back from our  patients is one way we can continue to improve our services. Please take a few minutes to complete the written survey that you may receive in the mail after your visit with us. Thank you!             Your Updated Medication List - Protect others around you: Learn how to safely use, store and throw away your medicines at www.disposemymeds.org.          This list is accurate as of: 10/13/17 12:39 PM.  Always use your most recent med list.                   Brand Name Dispense Instructions for use Diagnosis    atorvastatin 10 MG tablet    LIPITOR     Take 20 mg by mouth daily    Exposure to other ionizing radiation, subsequent encounter, Multiple thyroid nodules, Empty sella (H)       escitalopram 5 MG tablet    LEXAPRO     Take 5 mg by mouth        metoprolol 25 MG 24 hr tablet    TOPROL-XL     take 1/2 tablet by mouth once daily        MULTI-VITAMINS PO      Take  by mouth daily.    Anemia, iron deficiency       PREVACID 30 MG CR capsule   Generic drug:  LANsoprazole      1 CAPSULE DAILY BEFORE EATING

## 2017-10-13 NOTE — PROGRESS NOTES
HISTORY OF PRESENT ILLNESS  Ms. Briones is a pleasant 51 year old year old female who presents to clinic today with back and hip pain.  Carolyn explains that her back and hips have been bothering her for the past week or 2.  No incident she can recall.  Positional, central, worse with bending forward.  Lumbosacral area is where she points. Denies numbness or tingling down the legs, no history of back pain in the past. She has improved slightly since onset.  Her mom was recently diagnosed with a very rare metastatic lung cancer.  Quality: achy pain, dull    Severity: moderate    Timing: occurs intermittently  Modifying factors: resting and non-use makes it better, movement and use makes it worse  Associated signs & symptoms: none  Previous similar pain: yes  Additional history: as documented    MEDICAL HISTORY  Patient Active Problem List   Diagnosis     Anemia, iron deficiency     CARDIOVASCULAR SCREENING; LDL GOAL LESS THAN 160     Hand pain     Vitamin D deficiency     Female stress incontinence     Iron deficiency anemia due to chronic blood loss     Perimenopause     Pain in both feet     Stress at home     Thyroid nodule     Neck pain       Current Outpatient Prescriptions   Medication Sig Dispense Refill     escitalopram (LEXAPRO) 5 MG tablet Take 5 mg by mouth       metoprolol (TOPROL-XL) 25 MG 24 hr tablet take 1/2 tablet by mouth once daily  0     atorvastatin (LIPITOR) 10 MG tablet Take 20 mg by mouth daily        Multiple Vitamin (MULTI-VITAMINS PO) Take  by mouth daily.       PREVACID 30 MG OR CPDR 1 CAPSULE DAILY BEFORE EATING         Allergies   Allergen Reactions     Iron Dextran Shortness Of Breath     Doxycycline Other (See Comments)     High blood pressure     Cipro [Ciprofloxacin] Nausea     Liquid Adhesive Itching     Sulphadimidine [Sulfa Drugs] Nausea     Adhesive Tape Rash       Family History   Problem Relation Age of Onset     Lipids Mother      C.A.D. Father      CABG age 65     Thyroid  Disease No family hx of      Thyroid Cancer No family hx of        Additional medical/Social/Surgical histories reviewed in [facility] and updated as appropriate.     REVIEW OF SYSTEMS (10/13/2017)  10 point ROS of systems including Constitutional, Eyes, Respiratory, Cardiovascular, Gastroenterology, Genitourinary, Integumentary, Musculoskeletal, Psychiatric were all negative except for pertinent positives noted in my HPI.     PHYSICAL EXAM  Vitals:    10/13/17 1051   BP: 110/70   BP Location: Right arm   Patient Position: Chair   Cuff Size: Adult Regular   Pulse: 83   SpO2: 96%     General  - normal appearance, in no obvious distress  CV  - normal peripheral perfusion  Pulm  - normal respiratory pattern, non-labored  Musculoskeletal - lumbar spine  - stance: slow to rise and sit  - inspection: normal bone and joint alignment, no obvious scoliosis  - palpation: bilateral lumbar paravertebral spasm, left worse than right  - ROM: pain with bilateral sidebending, flexion past 30 deg  - strength: lower extremities 5/5 in all planes  - special tests:  (-) straight leg raise bilaterally  Neuro  - patellar and Achilles DTRs 2+ bilaterally, no sensory or motor deficit, grossly normal coordination, normal muscle tone  Skin  - no ecchymosis, erythema, warmth, or induration, no obvious rash  Psych  - interactive, appropriate, normal mood and affect        ASSESSMENT & PLAN  Ms. Briones is a 51 year old year old female who is in the office today with acute to subacute low back pain.      I'm happy she is improving to some degree, I do think that she will continue to improve over the next couple of weeks.  We discussed the importance of early mobilization, NSAIDs, and ice and heat as needed.  I demonstrated some exercises in the room for her to perform a daily basis.    If still painful in the next 2-3 weeks I would consider imaging of the lumbar spine.    It was a pleasure taking care of Carolyn.        Artur Thomas DO, CAM

## 2017-10-13 NOTE — PATIENT INSTRUCTIONS
Thanks for coming today.  [facility]  [address] [place] [address]    To schedule future appointments in Ortho Clinic, you may call [phone].    To schedule ordered imaging by your provider:   Call Central Imaging [phone]    To schedule an injection ordered by your provider:  Call Central Imaging Injection scheduling line: [phone]  CoachLogixhart available online at:  [email or web address]    Please call if any further questions or concerns ([phone]).  Clinic hours 8 am to 5 pm.    Return to clinic (call) if symptoms worsen or fail to improve.

## 2017-12-07 ENCOUNTER — OFFICE VISIT (OUTPATIENT)
Dept: PODIATRY | Facility: CLINIC | Age: 52
End: 2017-12-07
Payer: COMMERCIAL

## 2017-12-07 VITALS
BODY MASS INDEX: 25.1 KG/M2 | OXYGEN SATURATION: 98 % | DIASTOLIC BLOOD PRESSURE: 78 MMHG | SYSTOLIC BLOOD PRESSURE: 120 MMHG | HEART RATE: 84 BPM | WEIGHT: 147 LBS | HEIGHT: 64 IN

## 2017-12-07 DIAGNOSIS — M77.8 CAPSULITIS OF RIGHT FOOT: ICD-10-CM

## 2017-12-07 DIAGNOSIS — M77.8 CAPSULITIS OF FOOT, LEFT: Primary | ICD-10-CM

## 2017-12-07 PROCEDURE — 99202 OFFICE O/P NEW SF 15 MIN: CPT | Performed by: PODIATRIST

## 2017-12-07 ASSESSMENT — PAIN SCALES - GENERAL: PAINLEVEL: SEVERE PAIN (6)

## 2017-12-07 NOTE — PROGRESS NOTES
Past Medical History:   Diagnosis Date     Anemia      Empty sella (H)     only partially empty; possibly normal vaiant     Exposure to other ionizing radiation, subsequent encounter 1986    [place]     GERD (gastroesophageal reflux disease)      Hypercholesterolemia      Hypertension      Iron deficiency      Multiple thyroid nodules     subcm     Patient Active Problem List   Diagnosis     Anemia, iron deficiency     CARDIOVASCULAR SCREENING; LDL GOAL LESS THAN 160     Hand pain     Vitamin D deficiency     Female stress incontinence     Iron deficiency anemia due to chronic blood loss     Perimenopause     Pain in both feet     Stress at home     Thyroid nodule     Neck pain     Past Surgical History:   Procedure Laterality Date     APPENDECTOMY       COLONOSCOPY       DILATION AND CURETTAGE SUCTION  11/13/2012    Procedure: DILATION AND CURETTAGE SUCTION;  Suction Dilation & Curettage    (8 weeks);  Surgeon: Ken Rose MD;  [facility]     EXCISE GANGLION WRIST  4/10/2014    Procedure: EXCISE GANGLION WRIST;  Excision Ganglion Cyst, Left Wrist;  Surgeon: Ashley Garcia MD;  [facility]     OPERATIVE HYSTEROSCOPY WITH MORCELLATOR  1/19/2012    Procedure:OPERATIVE HYSTEROSCOPY WITH MORCELLATOR; Hysteroscopy & Polypectomy With Morcellator; Surgeon:KEN ROSE; [facility]     Social History     Social History     Marital status:      Spouse name: N/A     Number of children: N/A     Years of education: N/A     Occupational History     Not on file.     Social History Main Topics     Smoking status: Never Smoker     Smokeless tobacco: Never Used     Alcohol use No     Drug use: No     Sexual activity: Yes     Partners: Male     Birth control/ protection: Condom     Other Topics Concern     Not on file     Social History Narrative     Family History   Problem Relation Age of Onset     Lipids Mother      C.A.D. Father      CABG age 65     Thyroid Disease No family hx of      Thyroid  Cancer No family hx of      SUBJECTIVE FINDINGS:  A 52-year-old female presents for pain in her feet.  She relates it hurts kind of on the toes and the MPJs.  It has been going on for about 2 weeks.  She relates no injuries.  She relates if she walks, it hurts.  If she sits, it does not hurt.  She relates that if she moves it a certain way, it hurts as well.  She relates she had some questions.  She relates prior to the summer of 2016, she had no foot problems.  In the summer of 2016, she started taking statin drugs.  A few weeks later, she developed pain in her Achilles tendon area.  She has seen a physician.  She had x-rays and an MRI done.  She did physical therapy.  She did not think that helped.  She stopped the statins.  The pain did not go away.  It was on and off again pain.  She relates that she discussed with her physician about this, and 2 different physicians did not feel it was related to the statin use.  She relates that she wears open-backed shoes.  She relates the back of the heels do not hurt currently.  Upon further discussion, she relates she was wearing a different pair of boots for a few days, which afterwards the forefoot pain started.      OBJECTIVE FINDINGS:  DP and PT are 2/4 bilaterally.  She has mild dorsally-contracted digits 2-5 bilaterally.  She has functional hallux limitus with dorsal first MPJ prominence bilaterally.  There is no erythema, no drainage, no odor, no calor bilaterally.  No gross tendon voids bilaterally.  There is no pain on palpation.  No pain on range of motion bilaterally.  She relates it hurts across the dorsal MPJs when it occurs.      ASSESSMENT AND PLAN:  Capsulitis, MPJs bilaterally.  She does have functional hallux limitus present with some arthritic changes in the first MPJ.  Diagnosis and treatment options discussed with the patient.  She is advised on stretching.  Spenco or Ped Pillow over-the-counter insoles advised and use discussed with her.  Metatarsal  pads dispensed and use discussed with her.  We will hold off on any x-rays now.  She does have a history of vitamin D deficiency.  She will return to clinic and see me if the symptoms do not resolve, otherwise as needed.   Also advised her on shoe gear use.

## 2017-12-07 NOTE — MR AVS SNAPSHOT
After Visit Summary   2017    Carolyn Briones    MRN: [id number]           Patient Information     Date Of Birth          1965        Visit Information        Provider Department      2017 1:00 PM Maximiliano Willett DPM [facility]        Today's Diagnoses     Capsulitis of foot, left    -  1    Capsulitis of right foot          Care Instructions    Thanks for coming today.  Or[facility] Medicine Clinic  [address] [place] [address]    To schedule future appointments in Ortho Clinic, you may call [phone].    To schedule ordered imaging by your provider:   Call Central Imaging [phone]    To schedule an injection ordered by your provider:  Call Central Imaging Injection scheduling line: [phone]  [employer] available online at:  [email or web address]    Please call if any further questions or concerns ([phone]).  Clinic hours 8 am to 5 pm.    Return to clinic (call) if symptoms worsen or fail to improve.            Follow-ups after your visit        Who to contact     If you have questions or need follow up information about today's clinic visit or your schedule please contact [facility] directly at [phone].  Normal or non-critical lab and imaging results will be communicated to you by iKnowlhart, letter or phone within 4 business days after the clinic has received the results. If you do not hear from us within 7 days, please contact the clinic through iKnowlhart or phone. If you have a critical or abnormal lab result, we will notify you by phone as soon as possible.  Submit refill requests through SocialCompare or call your pharmacy and they will forward the refill request to us. Please allow 3 business days for your refill to be completed.          Additional Information About Your Visit        MyChart Information     SocialCompare gives you secure access to your electronic health record. If you see a primary care  "provider, you can also send messages to your care team and make appointments. If you have questions, please call your primary care clinic.  If you do not have a primary care provider, please call [phone] and they will assist you.      Fungos is an electronic gateway that provides easy, online access to your medical records. With Fungos, you can request a clinic appointment, read your test results, renew a prescription or communicate with your care team.     To access your existing account, please contact your [facility] or call [phone] for assistance.        Care EveryWhere ID     This is your Care EveryWhere ID. This could be used by other organizations to access your [facility] medical records  [id number]        Your Vitals Were     Pulse Height Pulse Oximetry BMI (Body Mass Index)          84 1.619 m (5' 3.75\") 98% 25.43 kg/m2         Blood Pressure from Last 3 Encounters:   12/07/17 120/78   10/13/17 110/70   06/09/17 109/74    Weight from Last 3 Encounters:   12/07/17 66.7 kg (147 lb)   06/09/17 66.5 kg (146 lb 9.6 oz)   01/31/17 63.5 kg (140 lb)              Today, you had the following     No orders found for display       Primary Care Provider Office Phone # Fax #    Gentry Baer [phone]       [address] [address]        Equal Access to Services     [facility]: Hadii aad ku hadasho Soomaali, waaxda luqadaha, qaybta kaalmada adeegyada, bernardo winters. So [facility] [phone].    ATENCIÓN: Si habla español, tiene a lacey disposición servicios gratuitos de asistencia lingüística. Llame al [phone].    We comply with applicable federal civil rights laws and [place] laws. We do not discriminate on the basis of race, color, national origin, age, disability, sex, sexual orientation, or gender identity.            Thank you!     Thank you for choosing [facility]  for your care. Our " goal is always to provide you with excellent care. Hearing back from our patients is one way we can continue to improve our services. Please take a few minutes to complete the written survey that you may receive in the mail after your visit with us. Thank you!             Your Updated Medication List - Protect others around you: Learn how to safely use, store and throw away your medicines at www.disposemymeds.org.          This list is accurate as of: 12/7/17 11:59 PM.  Always use your most recent med list.                   Brand Name Dispense Instructions for use Diagnosis    atorvastatin 10 MG tablet    LIPITOR     Take 20 mg by mouth daily    Exposure to other ionizing radiation, subsequent encounter, Multiple thyroid nodules, Empty sella (H)       escitalopram 5 MG tablet    LEXAPRO     Take 5 mg by mouth        metoprolol 25 MG 24 hr tablet    TOPROL-XL     take 1/2 tablet by mouth once daily        MULTI-VITAMINS PO      Take  by mouth daily.    Anemia, iron deficiency       PREVACID 30 MG CR capsule   Generic drug:  LANsoprazole      1 CAPSULE DAILY BEFORE EATING

## 2017-12-07 NOTE — PATIENT INSTRUCTIONS
Thanks for coming today.  Ortho[facility]Sports Medicine Clinic  [address] [place] [address]    To schedule future appointments in Ortho Clinic, you may call [phone].    To schedule ordered imaging by your provider:   Call Central Imaging [phone]    To schedule an injection ordered by your provider:  Call Central Imaging Injection scheduling line: [phone]  G[employer] Comb[employer]art available online at:  [email or web address]    Please call if any further questions or concerns ([phone]).  Clinic hours 8 am to 5 pm.    Return to clinic (call) if symptoms worsen or fail to improve.

## 2017-12-07 NOTE — NURSING NOTE
"Carolyn Anselmo's goals for this visit include: Evaluate bilateral foot pain  She requests these members of her care team be copied on today's visit information: yes    PCP: Gentry Baer    Referring Provider:  No referring provider defined for this encounter.    Chief Complaint   Patient presents with     Consult     Bilateral foot pain, x 1 year       Initial /78  Pulse 84  Ht 1.619 m (5' 3.75\")  Wt 66.7 kg (147 lb)  SpO2 98%  BMI 25.43 kg/m2 Estimated body mass index is 25.43 kg/(m^2) as calculated from the following:    Height as of this encounter: 1.619 m (5' 3.75\").    Weight as of this encounter: 66.7 kg (147 lb).  Medication Reconciliation: complete    "

## 2018-03-12 ENCOUNTER — OFFICE VISIT (OUTPATIENT)
Dept: ORTHOPEDICS | Facility: CLINIC | Age: 53
End: 2018-03-12
Payer: COMMERCIAL

## 2018-03-12 DIAGNOSIS — M77.8 CAPSULITIS OF RIGHT FOOT: Primary | ICD-10-CM

## 2018-03-12 DIAGNOSIS — M72.2 PLANTAR FASCIITIS: ICD-10-CM

## 2018-03-12 DIAGNOSIS — M77.8 CAPSULITIS OF LEFT FOOT: ICD-10-CM

## 2018-03-12 NOTE — LETTER
3/12/2018       RE: Carolyn Briones  [address]   [place] [address]     Dear Colleague,    Thank you for referring your patient, Carolyn Briones, to the [facility] at [facility]. Please see a copy of my visit note below.    Past Medical History:   Diagnosis Date     Anemia      Empty sella (H)     only partially empty; possibly normal vaiant     Exposure to other ionizing radiation, subsequent encounter 1986    [place]     GERD (gastroesophageal reflux disease)      Hypercholesterolemia      Hypertension      Iron deficiency      Multiple thyroid nodules     subcm     Patient Active Problem List   Diagnosis     Anemia, iron deficiency     CARDIOVASCULAR SCREENING; LDL GOAL LESS THAN 160     Hand pain     Vitamin D deficiency     Female stress incontinence     Iron deficiency anemia due to chronic blood loss     Perimenopause     Pain in both feet     Stress at home     Thyroid nodule     Neck pain     Past Surgical History:   Procedure Laterality Date     APPENDECTOMY       COLONOSCOPY       DILATION AND CURETTAGE SUCTION  11/13/2012    Procedure: DILATION AND CURETTAGE SUCTION;  Suction Dilation & Curettage    (8 weeks);  Surgeon: Ken Rose MD;  [facility]     EXCISE GANGLION WRIST  4/10/2014    Procedure: EXCISE GANGLION WRIST;  Excision Ganglion Cyst, Left Wrist;  Surgeon: Ashley Garcia MD;  [facility]     OPERATIVE HYSTEROSCOPY WITH MORCELLATOR  1/19/2012    Procedure:OPERATIVE HYSTEROSCOPY WITH MORCELLATOR; Hysteroscopy & Polypectomy With Morcellator; Surgeon:KEN ROSE; [facility]     Social History     Social History     Marital status:      Spouse name: N/A     Number of children: N/A     Years of education: N/A     Occupational History     Not on file.     Social History Main Topics     Smoking status: Never Smoker     Smokeless tobacco: Never Used     Alcohol use No     Drug use: No     Sexual activity: Yes      Partners: Male     Birth control/ protection: Condom     Other Topics Concern     Not on file     Social History Narrative     Family History   Problem Relation Age of Onset     Lipids Mother      C.A.D. Father      CABG age 65     Thyroid Disease No family hx of      Thyroid Cancer No family hx of      SUBJECTIVE FINDINGS:  A 52-year-old female returns to clinic for capsulitis bilaterally.  She relates that this is not any better.  She relates that she also gets heel pain bilaterally.  It is worse in the morning, gets better as the day goes on but it still hurts.  Relates no injuries.  She relates she had 2 days since I have seen her last where it did not hurt at all.  She relates she was not doing anything different those days other than she did wear some heels 1 day.        OBJECTIVE FINDINGS:  She relates the pain is across the MPJs and the plantar heel.  She relates if she is just sitting, it does not hurt, but if she moves it or is on it, it hurts.        ASSESSMENT AND PLAN:  Capsulitis MPJs bilaterally.  Plantar fasciitis bilaterally.  Diagnosis and treatment options were discussed with the patient.  She relates she tried the insoles and did not think those helped at all.  She was instructed on stretching, icing.  Patient requested MRI because she is concerned about radiation exposure due to her history.  Diagnosis and treatment options were discussed with her.  MRI of the foot and ankles bilaterally ordered and use discussed with her.  I advised her on stretching, and she will return to clinic and see me in 2 weeks.  Previous notes were reviewed.     Again, thank you for allowing me to participate in the care of your patient.      Sincerely,    Maximiliano Willett DPM

## 2018-03-12 NOTE — PROGRESS NOTES
Past Medical History:   Diagnosis Date     Anemia      Empty sella (H)     only partially empty; possibly normal vaiant     Exposure to other ionizing radiation, subsequent encounter 1986    [place]     GERD (gastroesophageal reflux disease)      Hypercholesterolemia      Hypertension      Iron deficiency      Multiple thyroid nodules     subcm     Patient Active Problem List   Diagnosis     Anemia, iron deficiency     CARDIOVASCULAR SCREENING; LDL GOAL LESS THAN 160     Hand pain     Vitamin D deficiency     Female stress incontinence     Iron deficiency anemia due to chronic blood loss     Perimenopause     Pain in both feet     Stress at home     Thyroid nodule     Neck pain     Past Surgical History:   Procedure Laterality Date     APPENDECTOMY       COLONOSCOPY       DILATION AND CURETTAGE SUCTION  11/13/2012    Procedure: DILATION AND CURETTAGE SUCTION;  Suction Dilation & Curettage    (8 weeks);  Surgeon: Ken Rose MD;  [facility]     EXCISE GANGLION WRIST  4/10/2014    Procedure: EXCISE GANGLION WRIST;  Excision Ganglion Cyst, Left Wrist;  Surgeon: Ashley Garcia MD;  [facility]     OPERATIVE HYSTEROSCOPY WITH MORCELLATOR  1/19/2012    Procedure:OPERATIVE HYSTEROSCOPY WITH MORCELLATOR; Hysteroscopy & Polypectomy With Morcellator; Surgeon:KEN ROSE; [facility]     Social History     Social History     Marital status:      Spouse name: N/A     Number of children: N/A     Years of education: N/A     Occupational History     Not on file.     Social History Main Topics     Smoking status: Never Smoker     Smokeless tobacco: Never Used     Alcohol use No     Drug use: No     Sexual activity: Yes     Partners: Male     Birth control/ protection: Condom     Other Topics Concern     Not on file     Social History Narrative     Family History   Problem Relation Age of Onset     Lipids Mother      C.A.D. Father      CABG age 65     Thyroid Disease No family hx of      Thyroid  Cancer No family hx of      SUBJECTIVE FINDINGS:  A 52-year-old female returns to clinic for capsulitis bilaterally.  She relates that this is not any better.  She relates that she also gets heel pain bilaterally.  It is worse in the morning, gets better as the day goes on but it still hurts.  Relates no injuries.  She relates she had 2 days since I have seen her last where it did not hurt at all.  She relates she was not doing anything different those days other than she did wear some heels 1 day.        OBJECTIVE FINDINGS:  She relates the pain is across the MPJs and the plantar heel.  She relates if she is just sitting, it does not hurt, but if she moves it or is on it, it hurts.        ASSESSMENT AND PLAN:  Capsulitis MPJs bilaterally.  Plantar fasciitis bilaterally.  Diagnosis and treatment options were discussed with the patient.  She relates she tried the insoles and did not think those helped at all.  She was instructed on stretching, icing.  Patient requested MRI because she is concerned about radiation exposure due to her history.  Diagnosis and treatment options were discussed with her.  MRI of the foot and ankles bilaterally ordered and use discussed with her.  I advised her on stretching, and she will return to clinic and see me in 2 weeks.  Previous notes were reviewed.

## 2018-03-12 NOTE — NURSING NOTE
Reason For Visit:   Chief Complaint   Patient presents with     RECHECK     Pain, bilateral feet. Capsulitis, MPJs bilaterally. Last visit: 12-7-2017. Pt stated that there has been no improvement in her feet.        Pain Assessment  Patient Currently in Pain: Yes  Primary Pain Location: Foot  Pain Orientation: Right, Left  Pain Descriptors: Sore, Tender, Discomfort  Alleviating Factors: Rest  Aggravating Factors: Movement, Walking (Movement w/o walking. )             Current Outpatient Prescriptions   Medication Sig Dispense Refill     escitalopram (LEXAPRO) 5 MG tablet Take 5 mg by mouth       metoprolol (TOPROL-XL) 25 MG 24 hr tablet take 1/2 tablet by mouth once daily  0     atorvastatin (LIPITOR) 10 MG tablet Take 20 mg by mouth daily        Multiple Vitamin (MULTI-VITAMINS PO) Take  by mouth daily.       PREVACID 30 MG OR CPDR 1 CAPSULE DAILY BEFORE EATING            Allergies   Allergen Reactions     Iron Dextran Shortness Of Breath     Doxycycline Other (See Comments)     High blood pressure     Cipro [Ciprofloxacin] Nausea     Liquid Adhesive Itching     Sulphadimidine [Sulfa Drugs] Nausea     Adhesive Tape Rash

## 2018-03-12 NOTE — MR AVS SNAPSHOT
After Visit Summary   3/12/2018    Carolyn Briones    MRN: [id number]           Patient Information     Date Of Birth          1965        Visit Information        Provider Department      3/12/2018 10:00 AM Maximiliano Willett DPM [facility]        Today's Diagnoses     Capsulitis of right foot    -  1    Capsulitis of left foot        Plantar fasciitis           Follow-ups after your visit        Your next 10 appointments already scheduled     Mar 19, 2018 10:15 AM CDT   (Arrive by 10:00 AM)   MR ANKLE RIGHT W/O CONTRAST with MGMR1   [facility] ([facility])    [address] [place] [id number] [phone]           Take your medicines as usual, unless your doctor tells you not to. Bring a list of your current medicines to your exam (including vitamins, minerals and over-the-counter drugs). Also bring the results of similar scans you may have had.  Please remove any body piercings and hair extensions before you arrive.  Follow your doctor s orders. If you do not, we may have to postpone your exam.  You may or may not receive IV contrast for this exam pending the discretion of the Radiologist.  You do not need to do anything special to prepare.  The MRI machine uses a strong magnet. Please wear clothes without metal (snaps, zippers). A sweatsuit works well, or we may give you a hospital gown.   **IMPORTANT** THE INSTRUCTIONS BELOW ARE ONLY FOR THOSE PATIENTS WHO HAVE BEEN PRESCRIBED SEDATION OR GENERAL ANESTHESIA DURING THEIR MRI PROCEDURE:  IF YOUR DOCTOR PRESCRIBED ORAL SEDATION (take medicine to help you relax during your exam):   You must get the medicine from your doctor (oral medication) before you arrive. Bring the medicine to the exam. Do not take it at home. You ll be told when to take it upon arriving for your exam.   Arrive one hour early. Bring someone who can take you home after the test. Your medicine will make you  sleepy. After the exam, you may not drive, take a bus or take a taxi by yourself.  IF YOUR DOCTOR PRESCRIBED IV SEDATION:   Arrive one hour early. Bring someone who can take you home after the test. Your medicine will make you sleepy. After the exam, you may not drive, take a bus or take a taxi by yourself.   No eating 6 hours before your exam. You may have clear liquids up until 4 hours before your exam. (Clear liquids include water, clear tea, black coffee and fruit juice without pulp.)  IF YOUR DOCTOR PRESCRIBED ANESTHESIA (be asleep for your exam):   Arrive 1 1/2 hours early. Bring someone who can take you home after the test. You may not drive, take a bus or take a taxi by yourself.   No eating 8 hours before your exam. You may have clear liquids up until 4 hours before your exam. (Clear liquids include water, clear tea, black coffee and fruit juice without pulp.)   You will spend four to five hours in the recovery room.  Please call the Imaging Department at your exam site with any questions.            Mar 19, 2018 11:00 AM CDT   (Arrive by 10:45 AM)   MR FOOT RIGHT W/O CONTRAST with MGMR1   [facility] ([facility])    [address] [place] [address] [phone]           Take your medicines as usual, unless your doctor tells you not to. Bring a list of your current medicines to your exam (including vitamins, minerals and over-the-counter drugs). Also bring the results of similar scans you may have had.  Please remove any body piercings and hair extensions before you arrive.  Follow your doctor s orders. If you do not, we may have to postpone your exam.  You may or may not receive IV contrast for this exam pending the discretion of the Radiologist.  You do not need to do anything special to prepare.  The MRI machine uses a strong magnet. Please wear clothes without metal (snaps, zippers). A sweatsuit works well, or we may give you a hospital gown.    **IMPORTANT** THE INSTRUCTIONS BELOW ARE ONLY FOR THOSE PATIENTS WHO HAVE BEEN PRESCRIBED SEDATION OR GENERAL ANESTHESIA DURING THEIR MRI PROCEDURE:  IF YOUR DOCTOR PRESCRIBED ORAL SEDATION (take medicine to help you relax during your exam):   You must get the medicine from your doctor (oral medication) before you arrive. Bring the medicine to the exam. Do not take it at home. You ll be told when to take it upon arriving for your exam.   Arrive one hour early. Bring someone who can take you home after the test. Your medicine will make you sleepy. After the exam, you may not drive, take a bus or take a taxi by yourself.  IF YOUR DOCTOR PRESCRIBED IV SEDATION:   Arrive one hour early. Bring someone who can take you home after the test. Your medicine will make you sleepy. After the exam, you may not drive, take a bus or take a taxi by yourself.   No eating 6 hours before your exam. You may have clear liquids up until 4 hours before your exam. (Clear liquids include water, clear tea, black coffee and fruit juice without pulp.)  IF YOUR DOCTOR PRESCRIBED ANESTHESIA (be asleep for your exam):   Arrive 1 1/2 hours early. Bring someone who can take you home after the test. You may not drive, take a bus or take a taxi by yourself.   No eating 8 hours before your exam. You may have clear liquids up until 4 hours before your exam. (Clear liquids include water, clear tea, black coffee and fruit juice without pulp.)   You will spend four to five hours in the recovery room.  Please call the Imaging Department at your exam site with any questions.            Mar 19, 2018 11:45 AM CDT   (Arrive by 11:30 AM)   MR ANKLE LEFT W/O CONTRAST with MGMR1   [facility] ([facility])    [address] [address] [place] [address] [phone]           Take your medicines as usual, unless your doctor tells you not to. Bring a list of your current medicines to your exam (including vitamins, minerals and  over-the-counter drugs). Also bring the results of similar scans you may have had.  Please remove any body piercings and hair extensions before you arrive.  Follow your doctor s orders. If you do not, we may have to postpone your exam.  You may or may not receive IV contrast for this exam pending the discretion of the Radiologist.  You do not need to do anything special to prepare.  The MRI machine uses a strong magnet. Please wear clothes without metal (snaps, zippers). A sweatsuit works well, or we may give you a hospital gown.   **IMPORTANT** THE INSTRUCTIONS BELOW ARE ONLY FOR THOSE PATIENTS WHO HAVE BEEN PRESCRIBED SEDATION OR GENERAL ANESTHESIA DURING THEIR MRI PROCEDURE:  IF YOUR DOCTOR PRESCRIBED ORAL SEDATION (take medicine to help you relax during your exam):   You must get the medicine from your doctor (oral medication) before you arrive. Bring the medicine to the exam. Do not take it at home. You ll be told when to take it upon arriving for your exam.   Arrive one hour early. Bring someone who can take you home after the test. Your medicine will make you sleepy. After the exam, you may not drive, take a bus or take a taxi by yourself.  IF YOUR DOCTOR PRESCRIBED IV SEDATION:   Arrive one hour early. Bring someone who can take you home after the test. Your medicine will make you sleepy. After the exam, you may not drive, take a bus or take a taxi by yourself.   No eating 6 hours before your exam. You may have clear liquids up until 4 hours before your exam. (Clear liquids include water, clear tea, black coffee and fruit juice without pulp.)  IF YOUR DOCTOR PRESCRIBED ANESTHESIA (be asleep for your exam):   Arrive 1 1/2 hours early. Bring someone who can take you home after the test. You may not drive, take a bus or take a taxi by yourself.   No eating 8 hours before your exam. You may have clear liquids up until 4 hours before your exam. (Clear liquids include water, clear tea, black coffee and fruit juice  without pulp.)   You will spend four to five hours in the recovery room.  Please call the Imaging Department at your exam site with any questions.            Mar 19, 2018 12:30 PM CDT   (Arrive by 12:15 PM)   MR FOOT LEFT W/O CONTRAST with MGMR1   [facility] ([facility])    [address] [place] [address] [phone]           Take your medicines as usual, unless your doctor tells you not to. Bring a list of your current medicines to your exam (including vitamins, minerals and over-the-counter drugs). Also bring the results of similar scans you may have had.  Please remove any body piercings and hair extensions before you arrive.  Follow your doctor s orders. If you do not, we may have to postpone your exam.  You may or may not receive IV contrast for this exam pending the discretion of the Radiologist.  You do not need to do anything special to prepare.  The MRI machine uses a strong magnet. Please wear clothes without metal (snaps, zippers). A sweatsuit works well, or we may give you a hospital gown.   **IMPORTANT** THE INSTRUCTIONS BELOW ARE ONLY FOR THOSE PATIENTS WHO HAVE BEEN PRESCRIBED SEDATION OR GENERAL ANESTHESIA DURING THEIR MRI PROCEDURE:  IF YOUR DOCTOR PRESCRIBED ORAL SEDATION (take medicine to help you relax during your exam):   You must get the medicine from your doctor (oral medication) before you arrive. Bring the medicine to the exam. Do not take it at home. You ll be told when to take it upon arriving for your exam.   Arrive one hour early. Bring someone who can take you home after the test. Your medicine will make you sleepy. After the exam, you may not drive, take a bus or take a taxi by yourself.  IF YOUR DOCTOR PRESCRIBED IV SEDATION:   Arrive one hour early. Bring someone who can take you home after the test. Your medicine will make you sleepy. After the exam, you may not drive, take a bus or take a taxi by yourself.   No eating 6 hours  before your exam. You may have clear liquids up until 4 hours before your exam. (Clear liquids include water, clear tea, black coffee and fruit juice without pulp.)  IF YOUR DOCTOR PRESCRIBED ANESTHESIA (be asleep for your exam):   Arrive 1 1/2 hours early. Bring someone who can take you home after the test. You may not drive, take a bus or take a taxi by yourself.   No eating 8 hours before your exam. You may have clear liquids up until 4 hours before your exam. (Clear liquids include water, clear tea, black coffee and fruit juice without pulp.)   You will spend four to five hours in the recovery room.  Please call the Imaging Department at your exam site with any questions.            Mar 26, 2018  8:40 AM CDT   (Arrive by 8:25 AM)   RETURN FOOT/ANKLE with Maximiliano Willett DPM   [facility] ([facility])    [address] [id number] [phone]              Who to contact     Please call your clinic at [phone] to:    Ask questions about your health    Make or cancel appointments    Discuss your medicines    Learn about your test results    Speak to your doctor            Additional Information About Your Visit        [employer] Information     [employer] gives you secure access to your electronic health record. If you see a primary care provider, you can also send messages to your care team and make appointments. If you have questions, please call your primary care clinic.  If you do not have a primary care provider, please call [phone] and they will assist you.      [employer] is an electronic gateway that provides easy, online access to your medical records. With [employer], you can request a clinic appointment, read your test results, renew a prescription or communicate with your care team.     To access your existing account, please contact your [facility] or call [phone] for assistance.        Care  EveryWhere ID     This is your Care EveryWhere ID. This could be used by other organizations to access your [facility] medical records  [id number]         Blood Pressure from Last 3 Encounters:   12/07/17 120/78   10/13/17 110/70   06/09/17 109/74    Weight from Last 3 Encounters:   12/07/17 66.7 kg (147 lb)   06/09/17 66.5 kg (146 lb 9.6 oz)   01/31/17 63.5 kg (140 lb)              We Performed the Following     MR Ankle Left w/o Contrast     MR Foot Left w/o Contrast     MR Foot Right w/o Contrast        Primary Care Provider Office Phone # Fax #    Gentry Baer [phone]       [place] [address]        Equal Access to Services     KYLE BECERRA : Hadii italia patel hadasho Soomaali, waaxda luqadaha, qaybta kaalmada adeegyada, bernardo renner . So [facility] [phone].    ATENCIÓN: Si habla español, tiene a lacey disposición servicios gratuitos de asistencia lingüística. Llame al [phone].    We comply with applicable federal civil rights laws and [place] laws. We do not discriminate on the basis of race, color, national origin, age, disability, sex, sexual orientation, or gender identity.            Thank you!     Thank you for choosing [facility]  for your care. Our goal is always to provide you with excellent care. Hearing back from our patients is one way we can continue to improve our services. Please take a few minutes to complete the written survey that you may receive in the mail after your visit with us. Thank you!             Your Updated Medication List - Protect others around you: Learn how to safely use, store and throw away your medicines at www.disposemymeds.org.          This list is accurate as of 3/12/18 11:59 PM.  Always use your most recent med list.                   Brand Name Dispense Instructions for use Diagnosis    atorvastatin 10 MG tablet    LIPITOR     Take 20 mg by mouth daily    Exposure to other ionizing  radiation, subsequent encounter, Multiple thyroid nodules, Empty sella (H)       escitalopram 5 MG tablet    LEXAPRO     Take 5 mg by mouth        metoprolol succinate 25 MG 24 hr tablet    TOPROL-XL     take 1/2 tablet by mouth once daily        MULTI-VITAMINS PO      Take  by mouth daily.    Anemia, iron deficiency       PREVACID 30 MG CR capsule   Generic drug:  LANsoprazole      1 CAPSULE DAILY BEFORE EATING

## 2018-03-22 ENCOUNTER — RADIANT APPOINTMENT (OUTPATIENT)
Dept: MRI IMAGING | Facility: CLINIC | Age: 53
End: 2018-03-22
Attending: PODIATRIST
Payer: COMMERCIAL

## 2018-03-22 DIAGNOSIS — M72.2 PLANTAR FASCIITIS: ICD-10-CM

## 2018-03-22 DIAGNOSIS — M77.8 CAPSULITIS OF RIGHT FOOT: ICD-10-CM

## 2018-03-22 DIAGNOSIS — M77.8 CAPSULITIS OF LEFT FOOT: ICD-10-CM

## 2018-03-22 PROCEDURE — 73721 MRI JNT OF LWR EXTRE W/O DYE: CPT | Mod: 59 | Performed by: RADIOLOGY

## 2018-03-22 PROCEDURE — 73721 MRI JNT OF LWR EXTRE W/O DYE: CPT | Mod: RT | Performed by: RADIOLOGY

## 2018-03-22 PROCEDURE — 73718 MRI LOWER EXTREMITY W/O DYE: CPT | Mod: 59 | Performed by: RADIOLOGY

## 2018-03-22 PROCEDURE — 73718 MRI LOWER EXTREMITY W/O DYE: CPT | Mod: RT | Performed by: RADIOLOGY

## 2018-03-26 ENCOUNTER — OFFICE VISIT (OUTPATIENT)
Dept: ORTHOPEDICS | Facility: CLINIC | Age: 53
End: 2018-03-26
Payer: COMMERCIAL

## 2018-03-26 VITALS — HEIGHT: 64 IN | WEIGHT: 146.6 LBS | BODY MASS INDEX: 25.03 KG/M2

## 2018-03-26 DIAGNOSIS — M77.8 CAPSULITIS OF RIGHT FOOT: Primary | ICD-10-CM

## 2018-03-26 DIAGNOSIS — M77.8 CAPSULITIS OF LEFT FOOT: ICD-10-CM

## 2018-03-26 DIAGNOSIS — M72.2 PLANTAR FASCIITIS: ICD-10-CM

## 2018-03-26 NOTE — PROGRESS NOTES
Past Medical History:   Diagnosis Date     Anemia      Empty sella (H)     only partially empty; possibly normal vaiant     Exposure to other ionizing radiation, subsequent encounter 1986    [place]     GERD (gastroesophageal reflux disease)      Hypercholesterolemia      Hypertension      Iron deficiency      Multiple thyroid nodules     subcm     Patient Active Problem List   Diagnosis     Anemia, iron deficiency     CARDIOVASCULAR SCREENING; LDL GOAL LESS THAN 160     Hand pain     Vitamin D deficiency     Female stress incontinence     Iron deficiency anemia due to chronic blood loss     Perimenopause     Pain in both feet     Stress at home     Thyroid nodule     Neck pain     Past Surgical History:   Procedure Laterality Date     APPENDECTOMY       COLONOSCOPY       DILATION AND CURETTAGE SUCTION  11/13/2012    Procedure: DILATION AND CURETTAGE SUCTION;  Suction Dilation & Curettage    (8 weeks);  Surgeon: Ken Rose MD;  Location: [facility] OR     EXCISE GANGLION WRIST  4/10/2014    Procedure: EXCISE GANGLION WRIST;  Excision Ganglion Cyst, Left Wrist;  Surgeon: Ashley Garcia MD;  Location: [facility]     OPERATIVE HYSTEROSCOPY WITH MORCELLATOR  1/19/2012    Procedure:OPERATIVE HYSTEROSCOPY WITH MORCELLATOR; Hysteroscopy & Polypectomy With Morcellator; Surgeon:KEN ROSE; Location:[facility] OR     Social History     Social History     Marital status:      Spouse name: N/A     Number of children: N/A     Years of education: N/A     Occupational History     Not on file.     Social History Main Topics     Smoking status: Never Smoker     Smokeless tobacco: Never Used     Alcohol use No     Drug use: No     Sexual activity: Yes     Partners: Male     Birth control/ protection: Condom     Other Topics Concern     Not on file     Social History Narrative     Family History   Problem Relation Age of Onset     Lipids Mother      C.A.D. Father      CABG age 65     Thyroid Disease No family hx of      Thyroid  Cancer No family hx of

## 2018-03-26 NOTE — NURSING NOTE
"Reason For Visit:   Chief Complaint   Patient presents with     RECHECK     Bilateral Foot Pain Folow Up. Review MRI Scans.        Pain Assessment  Patient Currently in Pain: Yes  0-10 Pain Scale: 4  Primary Pain Location: Foot  Pain Orientation: Right, Left  Pain Descriptors: Discomfort, Aching  Alleviating Factors: Rest  Aggravating Factors: Movement, Standing, Walking               HEIGHT: 5' 3.75\", WEIGHT: 146 lbs 9.6 oz, BMI: Body mass index is 25.36 kg/(m^2).      Current Outpatient Prescriptions   Medication Sig Dispense Refill     escitalopram (LEXAPRO) 5 MG tablet Take 5 mg by mouth       metoprolol (TOPROL-XL) 25 MG 24 hr tablet take 1/2 tablet by mouth once daily  0     atorvastatin (LIPITOR) 10 MG tablet Take 20 mg by mouth daily        Multiple Vitamin (MULTI-VITAMINS PO) Take  by mouth daily.       PREVACID 30 MG OR CPDR 1 CAPSULE DAILY BEFORE EATING            Allergies   Allergen Reactions     Iron Dextran Shortness Of Breath     Doxycycline Other (See Comments)     High blood pressure     Cipro [Ciprofloxacin] Nausea     Liquid Adhesive Itching     Sulphadimidine [Sulfa Drugs] Nausea     Adhesive Tape Rash               "

## 2018-03-26 NOTE — MR AVS SNAPSHOT
"              After Visit Summary   3/26/2018    Carolyn Briones    MRN: [id number]           Patient Information     Date Of Birth          1965        Visit Information        Provider Department      3/26/2018 8:40 AM Maximiliano Willett DPM [facility]        Today's Diagnoses     Capsulitis of right foot    -  1    Capsulitis of left foot        Plantar fasciitis           Follow-ups after your visit        Who to contact     Please call your clinic at [phone] to:    Ask questions about your health    Make or cancel appointments    Discuss your medicines    Learn about your test results    Speak to your doctor            Additional Information About Your Visit        MyChart Information     [employer] gives you secure access to your electronic health record. If you see a primary care provider, you can also send messages to your care team and make appointments. If you have questions, please call your primary care clinic.  If you do not have a primary care provider, please call [phone] and they will assist you.      [employer] is an electronic gateway that provides easy, online access to your medical records. With [employer], you can request a clinic appointment, read your test results, renew a prescription or communicate with your care team.     To access your existing account, please contact your [facility] or call [phone] for assistance.        Care EveryWhere ID     This is your Care EveryWhere ID. This could be used by other organizations to access your [facility] medical records  [id number]        Your Vitals Were     Height BMI (Body Mass Index)                1.619 m (5' 3.75\") 25.36 kg/m2           Blood Pressure from Last 3 Encounters:   12/07/17 120/78   10/13/17 110/70   06/09/17 109/74    Weight from Last 3 Encounters:   03/26/18 66.5 kg (146 lb 9.6 oz)   12/07/17 66.7 kg (147 lb)   06/09/17 66.5 kg (146 lb 9.6 oz)              Today, you had " the following     No orders found for display       Primary Care Provider Office Phone # Fax #    Gentry Baer [phone]       [address] [address]        Equal Access to Services     KYLE BECERRA : Hadii aad ku hadroxanneo Sotomy, waaxda luqadaha, qaybta kaalmada adeadrielyada, bernardo lehman laJohnlatrice winters. So [facility] [phone].    ATENCIÓN: Si habla español, tiene a lacey disposición servicios gratuitos de asistencia lingüística. Llame al [phone].    We comply with applicable federal civil rights laws and [place] laws. We do not discriminate on the basis of race, color, national origin, age, disability, sex, sexual orientation, or gender identity.            Thank you!     Thank you for choosing [facility]  for your care. Our goal is always to provide you with excellent care. Hearing back from our patients is one way we can continue to improve our services. Please take a few minutes to complete the written survey that you may receive in the mail after your visit with us. Thank you!             Your Updated Medication List - Protect others around you: Learn how to safely use, store and throw away your medicines at www.disposemymeds.org.          This list is accurate as of 3/26/18 10:54 AM.  Always use your most recent med list.                   Brand Name Dispense Instructions for use Diagnosis    atorvastatin 10 MG tablet    LIPITOR     Take 20 mg by mouth daily    Exposure to other ionizing radiation, subsequent encounter, Multiple thyroid nodules, Empty sella (H)       escitalopram 5 MG tablet    LEXAPRO     Take 5 mg by mouth        metoprolol succinate 25 MG 24 hr tablet    TOPROL-XL     take 1/2 tablet by mouth once daily        MULTI-VITAMINS PO      Take  by mouth daily.    Anemia, iron deficiency       PREVACID 30 MG CR capsule   Generic drug:  LANsoprazole      1 CAPSULE DAILY BEFORE EATING

## 2018-03-26 NOTE — PROGRESS NOTES
Past Medical History:   Diagnosis Date     Anemia      Empty sella (H)     only partially empty; possibly normal vaiant     Exposure to other ionizing radiation, subsequent encounter 1986    [place]     GERD (gastroesophageal reflux disease)      Hypercholesterolemia      Hypertension      Iron deficiency      Multiple thyroid nodules     subcm     Patient Active Problem List   Diagnosis     Anemia, iron deficiency     CARDIOVASCULAR SCREENING; LDL GOAL LESS THAN 160     Hand pain     Vitamin D deficiency     Female stress incontinence     Iron deficiency anemia due to chronic blood loss     Perimenopause     Pain in both feet     Stress at home     Thyroid nodule     Neck pain     Past Surgical History:   Procedure Laterality Date     APPENDECTOMY       COLONOSCOPY       DILATION AND CURETTAGE SUCTION  11/13/2012    Procedure: DILATION AND CURETTAGE SUCTION;  Suction Dilation & Curettage    (8 weeks);  Surgeon: Ken Rose MD;  [facility]     EXCISE GANGLION WRIST  4/10/2014    Procedure: EXCISE GANGLION WRIST;  Excision Ganglion Cyst, Left Wrist;  Surgeon: Ashley Garcia MD;  [facility]     OPERATIVE HYSTEROSCOPY WITH MORCELLATOR  1/19/2012    Procedure:OPERATIVE HYSTEROSCOPY WITH MORCELLATOR; Hysteroscopy & Polypectomy With Morcellator; Surgeon:KEN ROSE; [facility]     Social History     Social History     Marital status:      Spouse name: N/A     Number of children: N/A     Years of education: N/A     Occupational History     Not on file.     Social History Main Topics     Smoking status: Never Smoker     Smokeless tobacco: Never Used     Alcohol use No     Drug use: No     Sexual activity: Yes     Partners: Male     Birth control/ protection: Condom     Other Topics Concern     Not on file     Social History Narrative     Family History   Problem Relation Age of Onset     Lipids Mother      C.A.D. Father      CABG age 65     Thyroid Disease No family hx of      Thyroid  Cancer No family hx of      SUBJECTIVE FINDINGS:  A 32-year-old female returns to clinic for capsulitis, MPJs bilaterally, plantar fasciitis.  She relates her feet still hurt, the heels and kind of across the ball of the foot.  She relates it is not bad, but it does annoy her.      OBJECTIVE FINDINGS:  MRI results reviewed with the patient in the clinic today, as well as films.      ASSESSMENT AND PLAN:  Capsulitis MPJs bilaterally, plantar fasciitis bilaterally.  Diagnosis and treatment options discussed with her.  She opted for no night splint.  Spenco over-the-counter insoles advised and use discussed with her.  I instructed her on stretching.  She will return to clinic and see me as needed or if she wants further treatment options.

## 2018-03-26 NOTE — LETTER
3/26/2018       RE: Carolyn Briones  [address]   [place] [address]     Dear Colleague,    Thank you for referring your patient, Carolyn Briones, to the [facility] at [facility]. Please see a copy of my visit note below.    Past Medical History:   Diagnosis Date     Anemia      Empty sella (H)     only partially empty; possibly normal vaiant     Exposure to other ionizing radiation, subsequent encounter 1986    [place]     GERD (gastroesophageal reflux disease)      Hypercholesterolemia      Hypertension      Iron deficiency      Multiple thyroid nodules     subcm     Patient Active Problem List   Diagnosis     Anemia, iron deficiency     CARDIOVASCULAR SCREENING; LDL GOAL LESS THAN 160     Hand pain     Vitamin D deficiency     Female stress incontinence     Iron deficiency anemia due to chronic blood loss     Perimenopause     Pain in both feet     Stress at home     Thyroid nodule     Neck pain     Past Surgical History:   Procedure Laterality Date     APPENDECTOMY       COLONOSCOPY       DILATION AND CURETTAGE SUCTION  11/13/2012    Procedure: DILATION AND CURETTAGE SUCTION;  Suction Dilation & Curettage    (8 weeks);  Surgeon: Ken Rose MD;  [facility]     EXCISE GANGLION WRIST  4/10/2014    Procedure: EXCISE GANGLION WRIST;  Excision Ganglion Cyst, Left Wrist;  Surgeon: Ashley Garcia MD;  [facility]     OPERATIVE HYSTEROSCOPY WITH MORCELLATOR  1/19/2012    Procedure:OPERATIVE HYSTEROSCOPY WITH MORCELLATOR; Hysteroscopy & Polypectomy With Morcellator; Surgeon:KEN ROSE; [facility]     Social History     Social History     Marital status:      Spouse name: N/A     Number of children: N/A     Years of education: N/A     Occupational History     Not on file.     Social History Main Topics     Smoking status: Never Smoker     Smokeless tobacco: Never Used     Alcohol use No     Drug use: No     Sexual activity: Yes      Partners: Male     Birth control/ protection: Condom     Other Topics Concern     Not on file     Social History Narrative     Family History   Problem Relation Age of Onset     Lipids Mother      C.A.D. Father      CABG age 65     Thyroid Disease No family hx of      Thyroid Cancer No family hx of      SUBJECTIVE FINDINGS:  A 32-year-old female returns to clinic for capsulitis, MPJs bilaterally, plantar fasciitis.  She relates her feet still hurt, the heels and kind of across the ball of the foot.  She relates it is not bad, but it does annoy her.      OBJECTIVE FINDINGS:  MRI results reviewed with the patient in the clinic today, as well as films.      ASSESSMENT AND PLAN:  Capsulitis MPJs bilaterally, plantar fasciitis bilaterally.  Diagnosis and treatment options discussed with her.  She opted for no night splint.  Spenco over-the-counter insoles advised and use discussed with her.  I instructed her on stretching.  She will return to clinic and see me as needed or if she wants further treatment options.         Past Medical History:   Diagnosis Date     Anemia      Empty sella (H)     only partially empty; possibly normal vaiant     Exposure to other ionizing radiation, subsequent encounter 1986 [place]     GERD (gastroesophageal reflux disease)      Hypercholesterolemia      Hypertension      Iron deficiency      Multiple thyroid nodules     subcm     Patient Active Problem List   Diagnosis     Anemia, iron deficiency     CARDIOVASCULAR SCREENING; LDL GOAL LESS THAN 160     Hand pain     Vitamin D deficiency     Female stress incontinence     Iron deficiency anemia due to chronic blood loss     Perimenopause     Pain in both feet     Stress at home     Thyroid nodule     Neck pain     Past Surgical History:   Procedure Laterality Date     APPENDECTOMY       COLONOSCOPY       DILATION AND CURETTAGE SUCTION  11/13/2012    Procedure: DILATION AND CURETTAGE SUCTION;  Suction Dilation & Curettage    (8 weeks);   Surgeon: Ken Rose MD;  [facility]     EXCISE GANGLION WRIST  4/10/2014    Procedure: EXCISE GANGLION WRIST;  Excision Ganglion Cyst, Left Wrist;  Surgeon: Ashley Garcia MD;  [facility]     OPERATIVE HYSTEROSCOPY WITH MORCELLATOR  1/19/2012    Procedure:OPERATIVE HYSTEROSCOPY WITH MORCELLATOR; Hysteroscopy & Polypectomy With Morcellator; Surgeon:KEN ROSE; [facility]     Social History     Social History     Marital status:      Spouse name: N/A     Number of children: N/A     Years of education: N/A     Occupational History     Not on file.     Social History Main Topics     Smoking status: Never Smoker     Smokeless tobacco: Never Used     Alcohol use No     Drug use: No     Sexual activity: Yes     Partners: Male     Birth control/ protection: Condom     Other Topics Concern     Not on file     Social History Narrative     Family History   Problem Relation Age of Onset     Lipids Mother      C.A.D. Father      CABG age 65     Thyroid Disease No family hx of      Thyroid Cancer No family hx of        Again, thank you for allowing me to participate in the care of your patient.      Sincerely,    Maximilinao Willett DPM

## 2018-04-29 ENCOUNTER — HEALTH MAINTENANCE LETTER (OUTPATIENT)
Age: 53
End: 2018-04-29

## 2018-05-23 ENCOUNTER — TRANSFERRED RECORDS (OUTPATIENT)
Dept: HEALTH INFORMATION MANAGEMENT | Facility: CLINIC | Age: 53
End: 2018-05-23

## 2018-05-24 ENCOUNTER — MEDICAL CORRESPONDENCE (OUTPATIENT)
Dept: HEALTH INFORMATION MANAGEMENT | Facility: CLINIC | Age: 53
End: 2018-05-24

## 2018-06-07 ENCOUNTER — OFFICE VISIT (OUTPATIENT)
Dept: PODIATRY | Facility: CLINIC | Age: 53
End: 2018-06-07
Payer: COMMERCIAL

## 2018-06-07 VITALS — OXYGEN SATURATION: 98 % | HEART RATE: 77 BPM | SYSTOLIC BLOOD PRESSURE: 106 MMHG | DIASTOLIC BLOOD PRESSURE: 64 MMHG

## 2018-06-07 DIAGNOSIS — M72.2 BILATERAL PLANTAR FASCIITIS: Primary | ICD-10-CM

## 2018-06-07 PROCEDURE — 99213 OFFICE O/P EST LOW 20 MIN: CPT | Performed by: PODIATRIST

## 2018-06-07 ASSESSMENT — PAIN SCALES - GENERAL: PAINLEVEL: SEVERE PAIN (7)

## 2018-06-07 NOTE — NURSING NOTE
Carolyn Briones's chief complaint for this visit includes:  Chief Complaint   Patient presents with     RECHECK     Capsulitis, bilateral foot     PCP: Gentry Baer    Referring Provider:  Gentry Baer MD  [address] [place] [address]    /64  Pulse 77  SpO2 98%  Severe Pain (7)     Do you need any medication refills at today's visit?

## 2018-06-07 NOTE — LETTER
6/7/2018         RE: Carolyn Briones  [address]  [place] [address]        Dear Colleague,    Thank you for referring your patient, Carolyn Briones, to the [facility]. Please see a copy of my visit note below.    Past Medical History:   Diagnosis Date     Anemia      Empty sella (H)     only partially empty; possibly normal vaiant     Exposure to other ionizing radiation, subsequent encounter 1986    [place]     GERD (gastroesophageal reflux disease)      Hypercholesterolemia      Hypertension      Iron deficiency      Multiple thyroid nodules     subcm     Patient Active Problem List   Diagnosis     Anemia, iron deficiency     CARDIOVASCULAR SCREENING; LDL GOAL LESS THAN 160     Hand pain     Vitamin D deficiency     Female stress incontinence     Iron deficiency anemia due to chronic blood loss     Perimenopause     Pain in both feet     Stress at home     Thyroid nodule     Neck pain     Past Surgical History:   Procedure Laterality Date     APPENDECTOMY       COLONOSCOPY       DILATION AND CURETTAGE SUCTION  11/13/2012    Procedure: DILATION AND CURETTAGE SUCTION;  Suction Dilation & Curettage    (8 weeks);  Surgeon: Ken Rose MD;  [facility]     EXCISE GANGLION WRIST  4/10/2014    Procedure: EXCISE GANGLION WRIST;  Excision Ganglion Cyst, Left Wrist;  Surgeon: Ashley Garcia MD;  [facility]     OPERATIVE HYSTEROSCOPY WITH MORCELLATOR  1/19/2012    Procedure:OPERATIVE HYSTEROSCOPY WITH MORCELLATOR; Hysteroscopy & Polypectomy With Morcellator; Surgeon:KEN ROSE; [facility]     Social History     Social History     Marital status:      Spouse name: N/A     Number of children: N/A     Years of education: N/A     Occupational History     Not on file.     Social History Main Topics     Smoking status: Never Smoker     Smokeless tobacco: Never Used     Alcohol use No     Drug use: No     Sexual activity: Yes     Partners: Male     Birth control/ protection:  Condom     Other Topics Concern     Not on file     Social History Narrative     Family History   Problem Relation Age of Onset     Lipids Mother      C.A.D. Father      CABG age 65     Thyroid Disease No family hx of      Thyroid Cancer No family hx of      SUBJECTIVE:  52-year-old female presents for plantar fasciitis bilaterally.  She relates it still hurts.  She relates since I have seen her last she got some insoles from [employer] and she wore those.  Then she started getting hip pain on both sides and then she stopped using those about 10 days ago and that has gotten better.  She relates that in May she started to get some lower back pain.  She has seen a chiropractor, he adjusted it and it got better.  She saw the chiropractor again who made another adjustment.  She went on a trip and she started getting shooting pain down her right leg and side.  She relates that she saw the chiropractor again and they did not think it was nerve pain.  She relates her heels feel better when they are elevated or in a heel.  She relates this plantar fasciitis started about 2 years ago after she was on a cholesterol medication.  It started in the back of the heel but now it is on the plantar heel.  Relates it does not hurt when she is just sitting there, but when she is on it and walking it does.      OBJECTIVE:  DP and PT are 2/4 bilaterally.  She has functional hallux limitus bilaterally.  She has no pain on palpation bilaterally.  She gets a shooting pain up the Achilles tendon course when I load the foot, dorsiflex it bilaterally.  No gross tendon voids bilaterally.  MRI findings as noted.  She does have plantar fasciitis and peroneal tenosynovitis noted as in the EMR.      ASSESSMENT AND PLAN:  Plantar fasciitis bilaterally.  Diagnosis and treatment options discussed with the patient.  Prescription for physical therapy given and use discussed with her.  I gave her a referral to Orthopedics to have her back looked at to  help rule out radiculopathy or sciatica and further workup as needed.  She will return to clinic to see me in about 6 weeks.  Order for physical therapy given and use discussed with her.         Again, thank you for allowing me to participate in the care of your patient.        Sincerely,        Maximiliano Willett DPM

## 2018-06-07 NOTE — PATIENT INSTRUCTIONS
Thanks for coming today.  Ortho/Sports Medicine Clinic  [address] [place] [address]    To schedule future appointments in Ortho Clinic, you may call [phone].    To schedule ordered imaging by your provider:   Call Central Imaging [phone]    To schedule an injection ordered by your provider:  Call Central Imaging Injection scheduling line: [phone]  [facility] available online at:  [email or web address]    Please call if any further questions or concerns ([phone]).  Clinic hours 8 am to 5 pm.    Return to clinic (call) if symptoms worsen or fail to improve.

## 2018-06-07 NOTE — PROGRESS NOTES
Past Medical History:   Diagnosis Date     Anemia      Empty sella (H)     only partially empty; possibly normal vaiant     Exposure to other ionizing radiation, subsequent encounter 1986    [place]     GERD (gastroesophageal reflux disease)      Hypercholesterolemia      Hypertension      Iron deficiency      Multiple thyroid nodules     subcm     Patient Active Problem List   Diagnosis     Anemia, iron deficiency     CARDIOVASCULAR SCREENING; LDL GOAL LESS THAN 160     Hand pain     Vitamin D deficiency     Female stress incontinence     Iron deficiency anemia due to chronic blood loss     Perimenopause     Pain in both feet     Stress at home     Thyroid nodule     Neck pain     Past Surgical History:   Procedure Laterality Date     APPENDECTOMY       COLONOSCOPY       DILATION AND CURETTAGE SUCTION  11/13/2012    Procedure: DILATION AND CURETTAGE SUCTION;  Suction Dilation & Curettage    (8 weeks);  Surgeon: Ken Rose MD;  [facility]     EXCISE GANGLION WRIST  4/10/2014    Procedure: EXCISE GANGLION WRIST;  Excision Ganglion Cyst, Left Wrist;  Surgeon: Ashley Garcia MD;  [facility]     OPERATIVE HYSTEROSCOPY WITH MORCELLATOR  1/19/2012    Procedure:OPERATIVE HYSTEROSCOPY WITH MORCELLATOR; Hysteroscopy & Polypectomy With Morcellator; Surgeon:KEN ROSE; [facility]     Social History     Social History     Marital status:      Spouse name: N/A     Number of children: N/A     Years of education: N/A     Occupational History     Not on file.     Social History Main Topics     Smoking status: Never Smoker     Smokeless tobacco: Never Used     Alcohol use No     Drug use: No     Sexual activity: Yes     Partners: Male     Birth control/ protection: Condom     Other Topics Concern     Not on file     Social History Narrative     Family History   Problem Relation Age of Onset     Lipids Mother      C.A.D. Father      CABG age 65     Thyroid Disease No family hx of      Thyroid  Cancer No family hx of      SUBJECTIVE:  52-year-old female presents for plantar fasciitis bilaterally.  She relates it still hurts.  She relates since I have seen her last she got some insoles from [employer] and she wore those.  Then she started getting hip pain on both sides and then she stopped using those about 10 days ago and that has gotten better.  She relates that in May she started to get some lower back pain.  She has seen a chiropractor, he adjusted it and it got better.  She saw the chiropractor again who made another adjustment.  She went on a trip and she started getting shooting pain down her right leg and side.  She relates that she saw the chiropractor again and they did not think it was nerve pain.  She relates her heels feel better when they are elevated or in a heel.  She relates this plantar fasciitis started about 2 years ago after she was on a cholesterol medication.  It started in the back of the heel but now it is on the plantar heel.  Relates it does not hurt when she is just sitting there, but when she is on it and walking it does.      OBJECTIVE:  DP and PT are 2/4 bilaterally.  She has functional hallux limitus bilaterally.  She has no pain on palpation bilaterally.  She gets a shooting pain up the Achilles tendon course when I load the foot, dorsiflex it bilaterally.  No gross tendon voids bilaterally.  MRI findings as noted.  She does have plantar fasciitis and peroneal tenosynovitis noted as in the EMR.      ASSESSMENT AND PLAN:  Plantar fasciitis bilaterally.  Diagnosis and treatment options discussed with the patient.  Prescription for physical therapy given and use discussed with her.  I gave her a referral to Orthopedics to have her back looked at to help rule out radiculopathy or sciatica and further workup as needed.  She will return to clinic to see me in about 6 weeks.  Order for physical therapy given and use discussed with her.

## 2018-06-14 ENCOUNTER — OFFICE VISIT (OUTPATIENT)
Dept: ORTHOPEDICS | Facility: CLINIC | Age: 53
End: 2018-06-14
Payer: COMMERCIAL

## 2018-06-14 ENCOUNTER — RADIANT APPOINTMENT (OUTPATIENT)
Dept: GENERAL RADIOLOGY | Facility: CLINIC | Age: 53
End: 2018-06-14
Attending: FAMILY MEDICINE
Payer: COMMERCIAL

## 2018-06-14 VITALS — SYSTOLIC BLOOD PRESSURE: 114 MMHG | DIASTOLIC BLOOD PRESSURE: 75 MMHG | OXYGEN SATURATION: 97 % | HEART RATE: 65 BPM

## 2018-06-14 DIAGNOSIS — M54.16 LUMBAR RADICULOPATHY: Primary | ICD-10-CM

## 2018-06-14 DIAGNOSIS — M54.16 LUMBAR RADICULOPATHY: ICD-10-CM

## 2018-06-14 LAB — RADIOLOGIST FLAGS: NORMAL

## 2018-06-14 PROCEDURE — 72100 X-RAY EXAM L-S SPINE 2/3 VWS: CPT | Performed by: RADIOLOGY

## 2018-06-14 PROCEDURE — 99213 OFFICE O/P EST LOW 20 MIN: CPT | Performed by: FAMILY MEDICINE

## 2018-06-14 ASSESSMENT — PAIN SCALES - GENERAL: PAINLEVEL: SEVERE PAIN (6)

## 2018-06-14 NOTE — PATIENT INSTRUCTIONS
Thanks for coming today.  Ortho/Sports Medicine Clinic  [address] [place] [address]    To schedule future appointments in Ortho Clinic, you may call [phone].    To schedule ordered imaging by your provider:   Call Central Imaging [phone]    To schedule an injection ordered by your provider:  Call Central Imaging Injection scheduling line: [phone]  [employer] [employer] available online at:  [email or web address]    Please call if any further questions or concerns ([phone]).  Clinic hours 8 am to 5 pm.    Return to clinic (call) if symptoms worsen or fail to improve.

## 2018-06-14 NOTE — LETTER
"    6/14/2018         RE: Carolyn Briones  [address]  [place] [address]        Dear Colleague,    Thank you for referring your patient, Carolyn Brionse, to the [facility]. Please see a copy of my visit note below.    HISTORY OF PRESENT ILLNESS  Ms. Briones is a pleasant 52 year old year old female following up with back pain. I last saw her in October of last year.  Carolyn recently sought chiropractic treatment after her low back began to bother her again.  Treatment went well during the session, but in the following days she felt an intensifying low back and right posterior leg pain.  Dull, \"like a toothace,\" shooting, stabbing down her leg.  Denies weakness.  She does have some symptoms in her bilateral posterolateral hips, she's unsure if this is related or secondary to gait changes with her existing plantar fasciitis.  Additional history: as documented      REVIEW OF SYSTEMS (6/14/2018)  10 point ROS of systems including Constitutional, Eyes, Respiratory, Cardiovascular, Gastroenterology, Genitourinary, Integumentary, Musculoskeletal, Psychiatric were all negative except for pertinent positives noted in my HPI.     PHYSICAL EXAM  Vitals:    06/14/18 0835   BP: 114/75   Pulse: 65   SpO2: 97%     General  - normal appearance, in no obvious distress  CV  - normal peripheral perfusion  Pulm  - normal respiratory pattern, non-labored  Musculoskeletal - lumbar spine  - stance: normal gait without limp  - inspection: normal bone and joint alignment, no obvious scoliosis  - palpation: no paravertebral or bony tenderness  - ROM: flexion exacerbates pain, normal extension, sidebending, rotation  - strength: lower extremities 5/5 in all planes  - special tests:  (+) slump test on R  Neuro  - patellar and Achilles DTRs 2+ bilaterally, grossly normal coordination, normal muscle tone  Skin  - no ecchymosis, erythema, warmth, or induration, no obvious rash  Psych  - interactive, appropriate, normal mood and " affect          ASSESSMENT & PLAN  Ms. Briones is a 52 year old year old female following up with back pain.    I ordered & reviewed an xray of her lumbar spine which does show some possible disc space narrowing at the inferior levels.    I do believe her symptoms are radicular in nature.    Carolyn and I had a good discussion regarding non-operative treatment for degenerative disc disease.  This included strengthening of the paraspinal and core muscles, weight control, and oral analgesics when needed.  We also discussed the role of epidural corticosteroid injections.    I'm ordering an MRI of her lumbar spine to investigate.  She'll follow up after her MRI.    It was a pleasure seeing Carolyn.        Artur Thomas DO, [facility]            Again, thank you for allowing me to participate in the care of your patient.        Sincerely,        Artur Thomas DO

## 2018-06-14 NOTE — NURSING NOTE
Carolyn Briones's chief complaint for this visit includes:  Chief Complaint   Patient presents with     RECHECK     pt states pain from lumbar spine that radiates down to right calf.      PCP: Gentry Baer    Referring Provider:  No referring provider defined for this encounter.    /75  Pulse 65  SpO2 97%  Severe Pain (6)     Do you need any medication refills at today's visit? No

## 2018-06-14 NOTE — PROGRESS NOTES
"HISTORY OF PRESENT ILLNESS  Ms. Briones is a pleasant 52 year old year old female following up with back pain. I last saw her in October of last year.  Carolyn recently sought chiropractic treatment after her low back began to bother her again.  Treatment went well during the session, but in the following days she felt an intensifying low back and right posterior leg pain.  Dull, \"like a toothace,\" shooting, stabbing down her leg.  Denies weakness.  She does have some symptoms in her bilateral posterolateral hips, she's unsure if this is related or secondary to gait changes with her existing plantar fasciitis.  Additional history: as documented      REVIEW OF SYSTEMS (6/14/2018)  10 point ROS of systems including Constitutional, Eyes, Respiratory, Cardiovascular, Gastroenterology, Genitourinary, Integumentary, Musculoskeletal, Psychiatric were all negative except for pertinent positives noted in my HPI.     PHYSICAL EXAM  Vitals:    06/14/18 0835   BP: 114/75   Pulse: 65   SpO2: 97%     General  - normal appearance, in no obvious distress  CV  - normal peripheral perfusion  Pulm  - normal respiratory pattern, non-labored  Musculoskeletal - lumbar spine  - stance: normal gait without limp  - inspection: normal bone and joint alignment, no obvious scoliosis  - palpation: no paravertebral or bony tenderness  - ROM: flexion exacerbates pain, normal extension, sidebending, rotation  - strength: lower extremities 5/5 in all planes  - special tests:  (+) slump test on R  Neuro  - patellar and Achilles DTRs 2+ bilaterally, grossly normal coordination, normal muscle tone  Skin  - no ecchymosis, erythema, warmth, or induration, no obvious rash  Psych  - interactive, appropriate, normal mood and affect          ASSESSMENT & PLAN  Ms. Briones is a 52 year old year old female following up with back pain.    I ordered & reviewed an xray of her lumbar spine which does show some possible disc space narrowing at the inferior " levels.    I do believe her symptoms are radicular in nature.    Carolyn and I had a good discussion regarding non-operative treatment for degenerative disc disease.  This included strengthening of the paraspinal and core muscles, weight control, and oral analgesics when needed.  We also discussed the role of epidural corticosteroid injections.    I'm ordering an MRI of her lumbar spine to investigate.  She'll follow up after her MRI.    It was a pleasure seeing Carolyn.        Artur Thomas DO, CAQSM

## 2018-06-14 NOTE — MR AVS SNAPSHOT
After Visit Summary   2018    Carolyn Briones    MRN: [id number]           Patient Information     Date Of Birth          1965        Visit Information        Provider Department      2018 8:20 AM Artur Thomas,  [facility]        Today's Diagnoses     Lumbar radiculopathy    -  1      Care Instructions    Thanks for coming today.  Ortho/Sports Medicine Clinic  [address] [place] [address]    To schedule future appointments in Ortho Clinic, you may call [phone].    To schedule ordered imaging by your provider:   Call Central Imaging [phone]    To schedule an injection ordered by your provider:  Call Central Imaging Injection scheduling line: [phone]  Chondrial Therapeuticshart available online at:  [email or web address]    Please call if any further questions or concerns ([phone]).  Clinic hours 8 am to 5 pm.    Return to clinic (call) if symptoms worsen or fail to improve.            Follow-ups after your visit        Your next 10 appointments already scheduled     Gavino 15, 2018 10:15 AM CDT   MA SCREENING DIGITAL BILATERAL with MGMA1, MG MA TECH   [facility] ([facility])    [address] [place] [id number] [phone]           Do not use any powder, lotion or deodorant under your arms or on your breast. If you do, we will ask you to remove it before your exam.  Wear comfortable, two-piece clothing.  If you have any allergies, tell your care team.  Bring any previous mammograms from other facilities or have them mailed to the breast center. Three-dimensional (3D) mammograms are available at [facility] locations in [facility], [facility], [facility], [facility], and [place]. [facility] locations include [facility] in [place]. Benefits of 3D mammograms include: - Improved rate of cancer detection - Decreases your chance of  "having to go back for more tests, which means fewer: - \"False-positive\" results (This means that there is an abnormal area but it isn't cancer.) - Invasive testing procedures, such as a biopsy or surgery - Can provide clearer images of the breast if you have dense breast tissue. 3D mammography is an optional exam that anyone can have with a 2D mammogram. It doesn't replace or take the place of a 2D mammogram. 2D mammograms remain an effective screening test for all women.  Not all insurance companies cover the cost of a 3D mammogram. Check with your insurance.            Jun 18, 2018  4:50 PM CDT   (Arrive by 4:35 PM)   MINH Extremity with Obdulia Guevara, PT   [facility] ([facility]  )    [address]  [facility] [address] [phone]              Future tests that were ordered for you today     Open Future Orders        Priority Expected Expires Ordered    MR Lumbar Spine w/o Contrast Routine  6/14/2019 6/14/2018    MA Screening Digital Bilateral Routine  6/14/2019 6/14/2018            Who to contact     If you have questions or need follow up information about today's clinic visit or your schedule please contact [facility] directly at [phone].  Normal or non-critical lab and imaging results will be communicated to you by Destiny Pharmahart, letter or phone within 4 business days after the clinic has received the results. If you do not hear from us within 7 days, please contact the clinic through Destiny Pharmahart or phone. If you have a critical or abnormal lab result, we will notify you by phone as soon as possible.  Submit refill requests through Dragonfly or call your pharmacy and they will forward the refill request to us. Please allow 3 business days for your refill to be completed.          Additional Information About Your Visit        Dragonfly Information     Dragonfly gives you secure access to your electronic health record. If you see a primary care " provider, you can also send messages to your care team and make appointments. If you have questions, please call your primary care clinic.  If you do not have a primary care provider, please call [phone] and they will assist you.      Shopseen is an electronic gateway that provides easy, online access to your medical records. With Shopseen, you can request a clinic appointment, read your test results, renew a prescription or communicate with your care team.     To access your existing account, please contact your [facility] or call [phone] for assistance.        Care EveryWhere ID     This is your Care EveryWhere ID. This could be used by other organizations to access your [facility] medical records  [id number]        Your Vitals Were     Pulse Pulse Oximetry                65 97%           Blood Pressure from Last 3 Encounters:   06/14/18 114/75   06/07/18 106/64   12/07/17 120/78    Weight from Last 3 Encounters:   03/26/18 66.5 kg (146 lb 9.6 oz)   12/07/17 66.7 kg (147 lb)   06/09/17 66.5 kg (146 lb 9.6 oz)               Primary Care Provider Office Phone # Fax #    Gentry Baer [phone]       [place] [address]        Equal Access to Services     KYLE BECERRA AH: Hadii aad ku hadasho Soomaali, waaxda luqadaha, qaybta kaalmada adeegyada, waxay idiin hayfahadn rahul winters. So [facility] [phone].    ATENCIÓN: Si habla español, tiene a lacey disposición servicios gratuitos de asistencia lingüística. Llame al [phone].    We comply with applicable federal civil rights laws and [place] laws. We do not discriminate on the basis of race, color, national origin, age, disability, sex, sexual orientation, or gender identity.            Thank you!     Thank you for choosing [facility]  for your care. Our goal is always to provide you with excellent care. Hearing back from our patients is one way we can continue to  improve our services. Please take a few minutes to complete the written survey that you may receive in the mail after your visit with us. Thank you!             Your Updated Medication List - Protect others around you: Learn how to safely use, store and throw away your medicines at www.disposemymeds.org.          This list is accurate as of 6/14/18  9:15 AM.  Always use your most recent med list.                   Brand Name Dispense Instructions for use Diagnosis    atorvastatin 10 MG tablet    LIPITOR     Take 20 mg by mouth daily    Exposure to other ionizing radiation, subsequent encounter, Multiple thyroid nodules, Empty sella (H)       escitalopram 5 MG tablet    LEXAPRO     Take 5 mg by mouth        metoprolol succinate 25 MG 24 hr tablet    TOPROL-XL     take 1/2 tablet by mouth once daily        MULTI-VITAMINS PO      Take  by mouth daily.    Anemia, iron deficiency       PREVACID 30 MG CR capsule   Generic drug:  LANsoprazole      1 CAPSULE DAILY BEFORE EATING

## 2018-06-15 ENCOUNTER — RADIANT APPOINTMENT (OUTPATIENT)
Dept: MAMMOGRAPHY | Facility: CLINIC | Age: 53
End: 2018-06-15
Payer: COMMERCIAL

## 2018-06-15 DIAGNOSIS — Z12.39 BREAST SCREENING: ICD-10-CM

## 2018-06-15 PROCEDURE — 77063 BREAST TOMOSYNTHESIS BI: CPT

## 2018-06-15 PROCEDURE — 77067 SCR MAMMO BI INCL CAD: CPT

## 2018-06-16 ENCOUNTER — HOSPITAL ENCOUNTER (OUTPATIENT)
Dept: MRI IMAGING | Facility: CLINIC | Age: 53
Discharge: HOME | End: 2018-06-16
Attending: FAMILY MEDICINE | Admitting: FAMILY MEDICINE
Payer: COMMERCIAL

## 2018-06-16 DIAGNOSIS — M54.16 LUMBAR RADICULOPATHY: ICD-10-CM

## 2018-06-16 PROCEDURE — 72148 MRI LUMBAR SPINE W/O DYE: CPT

## 2018-06-18 ENCOUNTER — THERAPY VISIT (OUTPATIENT)
Dept: PHYSICAL THERAPY | Facility: CLINIC | Age: 53
End: 2018-06-18
Payer: COMMERCIAL

## 2018-06-18 DIAGNOSIS — M79.672 PAIN IN BOTH FEET: Primary | ICD-10-CM

## 2018-06-18 DIAGNOSIS — M79.671 PAIN IN BOTH FEET: Primary | ICD-10-CM

## 2018-06-18 PROCEDURE — 97161 PT EVAL LOW COMPLEX 20 MIN: CPT | Mod: GP | Performed by: PHYSICAL THERAPIST

## 2018-06-18 PROCEDURE — 97530 THERAPEUTIC ACTIVITIES: CPT | Mod: GP | Performed by: PHYSICAL THERAPIST

## 2018-06-18 PROCEDURE — 97110 THERAPEUTIC EXERCISES: CPT | Mod: GP | Performed by: PHYSICAL THERAPIST

## 2018-06-18 NOTE — PROGRESS NOTES
[facility] Initial Evaluation  Subjective:  Patient is a 52 year old female presenting with rehab left ankle/foot hpi. The history is provided by the patient. No  was used.   Carolyn Briones is a 52 year old female with a bilateral feet condition.  Condition occurred with:  Insidious onset.  Condition occurred: for unknown reasons.  This is a chronic condition  Patient reports a hisotry of B feet pain for the past 2 years.  Experienced a sudden onset of B pain a the back of the heels.  Physical therapy was not helpful, but symptoms seemed to improved with time.  Symptoms have been worsening again since the fall of 2017 for no apparent reason.   Noticed symptoms after starting a statin medication, but stopping the drug had no effect on symptoms.  Also reports at the end of April 2017 she developed pain in the LB.  Went to a chiropractor and experienced good relief following the first visit.  At the second session, developed pain down the R leg.  This pain has persisted.  Patient referred to physical therapy 6/7/2018..    Patient reports pain:  Toes and other (heel).    Pain is described as aching and is intermittent and reported as 7/10.   Pain is worse in the A.M. and worse during the night.  Symptoms are exacerbated by walking and relieved by rest.  Since onset symptoms are unchanged.  Special tests:  MRI.      General health as reported by patient is good.  Pertinent medical history includes:  High blood pressure.  Medical allergies: yes.  Other surgeries include:  None reported.  Current medications:  High blood pressure medication.  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  Patient is working in normal job without restrictions.  Primary job tasks include:  Driving (1.5 hour commute).    Barriers include:  None as reported by patient.    Red flags:  None as reported by patient.                        Objective:  System    Ankle/Foot Evaluation  ROM:  PROM is normal.  AROM:     Dorsiflexion:  Left:   7  Right:   12  Plantarflexion:  Left:  75    Right:  75  Inversion:  Left:  40     Right:  50  Eversion:  27     Right:  27        Strength is normal.    SPECIAL TESTS: Special tests ankle: Repeated movements: toe flex and toe ext had NE on sx.    PALPATION: normal    EDEMA: normal                                                                Eveline Lumbar Evaluation    Posture:  Sitting: fair  Standing: fair  Lordosis: WNL  Lateral Shift: left  Correction of Posture: no effect    Movement Loss:  Flexion (Flex): min and pain  Extension (EXT): mod and pain  Side Tiff R (SG R): min and pain  Side Glide L (SG L): min  Test Movements:        EIL: During: no effect  After: no effect    Repeat EIL: During: no effect  After: better  Mechanical Response: IncROM        Conclusion lumbar: Assessing lumbar involvement.                                         ROS    Assessment/Plan:    Patient is a 52 year old female with B foot complaints.    Patient has the following significant findings with corresponding treatment plan.                Diagnosis 1:  B foot pain  Pain -  manual therapy, directional preference exercise and home program  Decreased ROM/flexibility - manual therapy, therapeutic exercise and home program  Decreased function - therapeutic activities and home program    Therapy Evaluation Codes:   1) History comprised of:   Personal factors that impact the plan of care:      Stressors including divorce and mother's illness.    Comorbidity factors that impact the plan of care are:      LBP/R LE pain.     Medications impacting care: None.  2) Examination of Body Systems comprised of:   Body structures and functions that impact the plan of care:      B feet.   Activity limitations that impact the plan of care are:      Walking.  3) Clinical presentation characteristics are:   Stable/Uncomplicated.  4) Decision-Making    Low complexity using standardized patient assessment instrument and/or  measureable assessment of functional outcome.  Cumulative Therapy Evaluation is: Low complexity.    Previous and current functional limitations:  (See Goal Flow Sheet for this information)    Short term and Long term goals: (See Goal Flow Sheet for this information)     Communication ability:  Patient appears to be able to clearly communicate and understand verbal and written communication and follow directions correctly.  Treatment Explanation - The following has been discussed with the patient:   RX ordered/plan of care  Anticipated outcomes  Possible risks and side effects  This patient would benefit from PT intervention to resume normal activities.   Rehab potential is good.    Frequency:  2 X week, once daily  Duration:  for 2 weeks tapering to 1 X a week over 6 weeks  Discharge Plan:  Achieve all LTG.  Independent in home treatment program.  Reach maximal therapeutic benefit.    Please refer to the daily flowsheet for treatment today, total treatment time and time spent performing 1:1 timed codes.

## 2018-06-28 ENCOUNTER — THERAPY VISIT (OUTPATIENT)
Dept: PHYSICAL THERAPY | Facility: CLINIC | Age: 53
End: 2018-06-28
Payer: COMMERCIAL

## 2018-06-28 DIAGNOSIS — M79.672 PAIN IN BOTH FEET: Primary | ICD-10-CM

## 2018-06-28 DIAGNOSIS — M79.671 PAIN IN BOTH FEET: Primary | ICD-10-CM

## 2018-06-28 PROCEDURE — 97110 THERAPEUTIC EXERCISES: CPT | Mod: GP | Performed by: PHYSICAL THERAPIST

## 2018-06-28 NOTE — PROGRESS NOTES
Subjective:  HPI                    Objective:  System    Physical Exam    General     ROS    Assessment/Plan:    {REHAB NOTES:397849}

## 2018-06-28 NOTE — MR AVS SNAPSHOT
After Visit Summary   6/28/2018    Carolyn Briones    MRN: [id number]           Patient Information     Date Of Birth          1965        Visit Information        Provider Department      6/28/2018 4:50 PM Obdulia Guevara, PT [facility]        Today's Diagnoses     Pain in both feet    -  1       Follow-ups after your visit        Your next 10 appointments already scheduled     Jul 02, 2018  2:10 PM CDT   MINH Extremity with Obdulia Guevara PT   [facility] ([facility]  )    [address] [facility] [id number] [phone]            Jul 02, 2018  3:00 PM CDT   Return Visit with Artur Thomas DO   [facility] ([facility]    [address] [facility] [id number] [phone]            Jul 05, 2018  4:10 PM CDT   MINH Extremity with Obdulia Guevara PT   [facility] ([facility]  )    [address] [facility] [id number] [phone]              Who to contact     If you have questions or need follow up information about today's clinic visit or your schedule please contact [facility] directly at [phone].  Normal or non-critical lab and imaging results will be communicated to you by MyChart, letter or phone within 4 business days after the clinic has received the results. If you do not hear from us within 7 days, please contact the clinic through Acerhart or phone. If you have a critical or abnormal lab result, we will notify you by phone as soon as possible.  Submit refill requests through Book of Odds or call your pharmacy and they will forward the refill request to us. Please allow 3 business days for your refill to be completed.          Additional Information About Your Visit        AcerharFillm Information     Book of Odds gives you secure access to your electronic health  record. If you see a primary care provider, you can also send messages to your care team and make appointments. If you have questions, please call your primary care clinic.  If you do not have a primary care provider, please call [phone] and they will assist you.        Care EveryWhere ID     This is your Care EveryWhere ID. This could be used by other organizations to access your [facility] medical records  [id number]         Blood Pressure from Last 3 Encounters:   06/14/18 114/75   06/07/18 106/64   12/07/17 120/78    Weight from Last 3 Encounters:   03/26/18 66.5 kg (146 lb 9.6 oz)   12/07/17 66.7 kg (147 lb)   06/09/17 66.5 kg (146 lb 9.6 oz)              We Performed the Following     THERAPEUTIC EXERCISES        Primary Care Provider Office Phone # Fax #    Gentry Baer [phone]       [address] [address]        Equal Access to Services     KYLE BECERRA : Hadii aad ku hadasho Soomaali, waaxda luqadaha, qaybta kaalmada adeegyada, waxay idiin hayfahadn rahul renner . So [facility] [phone].    ATENCIÓN: Si habla español, tiene a lacey disposición servicios gratuitos de asistencia lingüística. Llame al [phone].    We comply with applicable federal civil rights laws and [place] laws. We do not discriminate on the basis of race, color, national origin, age, disability, sex, sexual orientation, or gender identity.            Thank you!     Thank you for choosing [facility]  for your care. Our goal is always to provide you with excellent care. Hearing back from our patients is one way we can continue to improve our services. Please take a few minutes to complete the written survey that you may receive in the mail after your visit with us. Thank you!             Your Updated Medication List - Protect others around you: Learn how to safely use, store and throw away your medicines at www.disposemymeds.org.          This list  is accurate as of 6/28/18  6:08 PM.  Always use your most recent med list.                   Brand Name Dispense Instructions for use Diagnosis    atorvastatin 10 MG tablet    LIPITOR     Take 20 mg by mouth daily    Exposure to other ionizing radiation, subsequent encounter, Multiple thyroid nodules, Empty sella (H)       escitalopram 5 MG tablet    LEXAPRO     Take 5 mg by mouth        metoprolol succinate 25 MG 24 hr tablet    TOPROL-XL     take 1/2 tablet by mouth once daily        MULTI-VITAMINS PO      Take  by mouth daily.    Anemia, iron deficiency       PREVACID 30 MG CR capsule   Generic drug:  LANsoprazole      1 CAPSULE DAILY BEFORE EATING

## 2018-06-29 ENCOUNTER — TELEPHONE (OUTPATIENT)
Dept: ENDOCRINOLOGY | Facility: CLINIC | Age: 53
End: 2018-06-29

## 2018-06-29 NOTE — TELEPHONE ENCOUNTER
I believe it would be much more efficient if she see her primary care provider as visible palpitations of the thyroid are probably not the thyroid but much  more likely vascular.  Her PCP might check her thyroid blood levels since high thyroid could cause vascular palpitations . She has always had normal thyroid levels in the past .  If her PCP feels she needs to be seen by us urgently, after they have seen her first, we would add her on on 7/2 at 630 PM , which is the only option prior to her trip since I am also out on vacation.    Cierra Fink MD

## 2018-06-29 NOTE — TELEPHONE ENCOUNTER
[facility] Call Center    Phone Message    May a detailed message be left on voicemail: yes    Reason for Call: Symptoms or Concerns     If patient has red-flag symptoms, warm transfer to triage line    Current symptom or concern: Visible Palpitations of the thyroid    Symptoms have been present for:  2 day(s)    Has patient previously been seen for this? No    By : na    Date: na    Are there any new or worsening symptoms? No      Action Taken: Message routed to:  [facility] ([facility]): william vega

## 2018-06-29 NOTE — TELEPHONE ENCOUNTER
Contacted patient to review. Patient reports that she has noticed over the last 2 days she has noticed by visual inspection and by touch. Patient states that she feels it could possibly be larger on the left side. Patient denies any other associated symptoms. She denies having any difficulty swallowing or breathing.    Patient states that she is going out of the country for 4 weeks starting 7/8/18 and wanted to know if she should be seen before that time.    Will send to Dr. Fink to review.       Unique Matthew RN  Endocrine Care Coordinator  [facility]

## 2018-07-02 ENCOUNTER — OFFICE VISIT (OUTPATIENT)
Dept: ORTHOPEDICS | Facility: CLINIC | Age: 53
End: 2018-07-02
Payer: COMMERCIAL

## 2018-07-02 ENCOUNTER — THERAPY VISIT (OUTPATIENT)
Dept: PHYSICAL THERAPY | Facility: CLINIC | Age: 53
End: 2018-07-02
Payer: COMMERCIAL

## 2018-07-02 VITALS — DIASTOLIC BLOOD PRESSURE: 72 MMHG | OXYGEN SATURATION: 99 % | SYSTOLIC BLOOD PRESSURE: 115 MMHG | HEART RATE: 65 BPM

## 2018-07-02 DIAGNOSIS — M79.672 PAIN IN BOTH FEET: ICD-10-CM

## 2018-07-02 DIAGNOSIS — M79.671 PAIN IN BOTH FEET: ICD-10-CM

## 2018-07-02 DIAGNOSIS — M54.16 LUMBAR RADICULOPATHY: Primary | ICD-10-CM

## 2018-07-02 PROCEDURE — 97110 THERAPEUTIC EXERCISES: CPT | Mod: GP | Performed by: PHYSICAL THERAPIST

## 2018-07-02 PROCEDURE — 97140 MANUAL THERAPY 1/> REGIONS: CPT | Mod: GP | Performed by: PHYSICAL THERAPIST

## 2018-07-02 PROCEDURE — 99213 OFFICE O/P EST LOW 20 MIN: CPT | Performed by: FAMILY MEDICINE

## 2018-07-02 ASSESSMENT — PAIN SCALES - GENERAL: PAINLEVEL: MODERATE PAIN (4)

## 2018-07-02 NOTE — PATIENT INSTRUCTIONS
Thanks for coming today.  Ortho/Sports Medicine Clinic  [address] [place] [address]    To schedule future appointments in Ortho Clinic, you may call [phone].    To schedule ordered imaging by your provider:   Call Central Imaging [phone]    To schedule an injection ordered by your provider:  Call Central Imaging Injection scheduling line: [phone]  Frugalohart available online at:  [email or web address]    Please call if any further questions or concerns ([phone]).  Clinic hours 8 am to 5 pm.    Return to clinic (call) if symptoms worsen or fail to improve.

## 2018-07-02 NOTE — PROGRESS NOTES
HISTORY OF PRESENT ILLNESS  Ms. Briones is a pleasant 52 year old year old female following up with back pain.  I last saw Carolyn a couple of weeks ago and ordered an MRI of her back.  She's here to review her MRI.  Additional history: as documented      REVIEW OF SYSTEMS (7/2/2018)  10 point ROS of systems including Constitutional, Eyes, Respiratory, Cardiovascular, Gastroenterology, Genitourinary, Integumentary, Musculoskeletal, Psychiatric were all negative except for pertinent positives noted in my HPI.     PHYSICAL EXAM  Vitals:    07/02/18 1509   BP: 115/72   Pulse: 65   SpO2: 99%       ASSESSMENT & PLAN  Ms. Briones is a 52 year old year old female following up with back pain with radicular features.    I reviewed her MRI in the room with her:  L1-L2: There is no spinal canal or neural foraminal stenosis at this  level.     L2-L3: There is no spinal canal or neural foraminal stenosis at this  level.     L3-L4: There is minimal facet arthropathy bilaterally. There is no  spinal canal or neural foraminal stenosis at this level.     L5-S1: There is a circumferential disc bulge with a superimposed small  posterior central disc herniation (protrusion) and moderate facet  arthropathy bilaterally. These findings result in minimal spinal canal  narrowing but no foraminal stenosis on either side.     L5-S1: There is moderate facet arthropathy bilaterally. There is no  spinal canal or neural foraminal stenosis at this level.    Carolyn and ESHA had a good discussion regarding non-operative treatment for degenerative disc disease.  This included strengthening of the paraspinal and core muscles, weight control, and oral analgesics when needed.  We also discussed the role of epidural corticosteroid injections.    Her symptoms may be explained by the findings at the L4-5 level (I suspect a typo in the report above).    I'm referring her for a lumbar steroid injection.  Carolyn and I should follow up if worsening or  concerned.    It was a pleasure seeing Carolyn.    20 minutes was spent in the room, 15 of which was spent on counseling and coordination of care.        Artur Thomas DO, CAQSM

## 2018-07-02 NOTE — LETTER
7/2/2018         RE: Carolyn Briones  [address]  [place] [address]        Dear Colleague,    Thank you for referring your patient, Carolyn Briones, to the [facility]. Please see a copy of my visit note below.    HISTORY OF PRESENT ILLNESS  Ms. Briones is a pleasant 52 year old year old female following up with back pain.  I last saw Carolyn a couple of weeks ago and ordered an MRI of her back.  She's here to review her MRI.  Additional history: as documented      REVIEW OF SYSTEMS (7/2/2018)  10 point ROS of systems including Constitutional, Eyes, Respiratory, Cardiovascular, Gastroenterology, Genitourinary, Integumentary, Musculoskeletal, Psychiatric were all negative except for pertinent positives noted in my HPI.     PHYSICAL EXAM  Vitals:    07/02/18 1509   BP: 115/72   Pulse: 65   SpO2: 99%       ASSESSMENT & PLAN  Ms. Briones is a 52 year old year old female following up with back pain with radicular features.    I reviewed her MRI in the room with her:  L1-L2: There is no spinal canal or neural foraminal stenosis at this  level.     L2-L3: There is no spinal canal or neural foraminal stenosis at this  level.     L3-L4: There is minimal facet arthropathy bilaterally. There is no  spinal canal or neural foraminal stenosis at this level.     L5-S1: There is a circumferential disc bulge with a superimposed small  posterior central disc herniation (protrusion) and moderate facet  arthropathy bilaterally. These findings result in minimal spinal canal  narrowing but no foraminal stenosis on either side.     L5-S1: There is moderate facet arthropathy bilaterally. There is no  spinal canal or neural foraminal stenosis at this level.    Carolyn and I had a good discussion regarding non-operative treatment for degenerative disc disease.  This included strengthening of the paraspinal and core muscles, weight control, and oral analgesics when needed.  We also discussed the role of epidural  corticosteroid injections.    Her symptoms may be explained by the findings at the L4-5 level (I suspect a typo in the report above).    I'm referring her for a lumbar steroid injection.  Carolyn and I should follow up if worsening or concerned.    It was a pleasure seeing Carolyn.    20 minutes was spent in the room, 15 of which was spent on counseling and coordination of care.        Artur Thomas DO, [facility]          Again, thank you for allowing me to participate in the care of your patient.        Sincerely,        Artur Thomas DO

## 2018-07-02 NOTE — NURSING NOTE
Carolyn Briones's chief complaint for this visit includes:  Chief Complaint   Patient presents with     RECHECK     PCP: Gentry Baer    Referring Provider:  No referring provider defined for this encounter.    /72  Pulse 65  SpO2 99%  Moderate Pain (4)     Do you need any medication refills at today's visit? No

## 2018-07-02 NOTE — MR AVS SNAPSHOT
After Visit Summary   2018    Carolyn Briones    MRN: [id number]           Patient Information     Date Of Birth          1965        Visit Information        Provider Department      2018 3:00 PM Artur Thomas DO [facility]        Today's Diagnoses     Lumbar radiculopathy    -  1      Care Instructions    Thanks for coming today.  Ortho[facility] Medicine Clinic  [address] [place] [address]    To schedule future appointments in Ortho Clinic, you may call [phone].    To schedule ordered imaging by your provider:   Call Central Imaging [phone]    To schedule an injection ordered by your provider:  Call Central Imaging Injection scheduling line: [phone]  RallyOnhart available online at:  [email or web address]    Please call if any further questions or concerns ([phone]).  Clinic hours 8 am to 5 pm.    Return to clinic (call) if symptoms worsen or fail to improve.            Follow-ups after your visit        Your next 10 appointments already scheduled     2018  4:10 PM CDT   MINH Extremity with Obdulia Guevara, PT   [facility] Physical Therapy ([facility]  )    [address] [facility] [address] [phone]            Aug 28, 2018  2:20 PM CDT   (Arrive by 2:05 PM)   RETURN ENDOCRINE with Cierra Fink MD   [facility] Endocrinology ([facility] [place])    [address] [place] [address] [phone]              Future tests that were ordered for you today     Open Future Orders        Priority Expected Expires Ordered    XR Lumbar Epidural Injection Incl Imaging Routine 2018            Who to contact     If you have questions or need follow up information about today's clinic visit or your schedule please contact [facility] directly at [phone].  Normal or non-critical lab and imaging  results will be communicated to you by MyChart, letter or phone within 4 business days after the clinic has received the results. If you do not hear from us within 7 days, please contact the clinic through Ouroboros or phone. If you have a critical or abnormal lab result, we will notify you by phone as soon as possible.  Submit refill requests through Ouroboros or call your pharmacy and they will forward the refill request to us. Please allow 3 business days for your refill to be completed.          Additional Information About Your Visit        Ouroboros Information     Ouroboros gives you secure access to your electronic health record. If you see a primary care provider, you can also send messages to your care team and make appointments. If you have questions, please call your primary care clinic.  If you do not have a primary care provider, please call [phone] and they will assist you.      Ouroboros is an electronic gateway that provides easy, online access to your medical records. With Ouroboros, you can request a clinic appointment, read your test results, renew a prescription or communicate with your care team.     To access your existing account, please contact your [facility] or call [phone] for assistance.        Care EveryWhere ID     This is your Care EveryWhere ID. This could be used by other organizations to access your [place] medical records  [id number]        Your Vitals Were     Pulse Pulse Oximetry                65 99%           Blood Pressure from Last 3 Encounters:   07/02/18 115/72   06/14/18 114/75   06/07/18 106/64    Weight from Last 3 Encounters:   03/26/18 66.5 kg (146 lb 9.6 oz)   12/07/17 66.7 kg (147 lb)   06/09/17 66.5 kg (146 lb 9.6 oz)               Primary Care Provider Office Phone # Fax #    Gentry Baer [phone]       [address] [address]        Equal Access to Services     KYLE MACDONALD: Torin patel  alberto Ruiz, wamanuelitoda lugloriaadaha, qaybta kamagalida david, bernardo almeida kennadriel moyadolfo laboydjeannie singh. So [facility] [phone].    ATENCIÓN: Si habla español, tiene a lacey disposición servicios gratuitos de asistencia lingüística. Alice al [phone].    We comply with applicable federal civil rights laws and [place] laws. We do not discriminate on the basis of race, color, national origin, age, disability, sex, sexual orientation, or gender identity.            Thank you!     Thank you for choosing [facility]  for your care. Our goal is always to provide you with excellent care. Hearing back from our patients is one way we can continue to improve our services. Please take a few minutes to complete the written survey that you may receive in the mail after your visit with us. Thank you!             Your Updated Medication List - Protect others around you: Learn how to safely use, store and throw away your medicines at www.disposemymeds.org.          This list is accurate as of 7/2/18  3:46 PM.  Always use your most recent med list.                   Brand Name Dispense Instructions for use Diagnosis    atorvastatin 10 MG tablet    LIPITOR     Take 20 mg by mouth daily    Exposure to other ionizing radiation, subsequent encounter, Multiple thyroid nodules, Empty sella (H)       escitalopram 5 MG tablet    LEXAPRO     Take 5 mg by mouth        metoprolol succinate 25 MG 24 hr tablet    TOPROL-XL     take 1/2 tablet by mouth once daily        MULTI-VITAMINS PO      Take  by mouth daily.    Anemia, iron deficiency       PREVACID 30 MG CR capsule   Generic drug:  LANsoprazole      1 CAPSULE DAILY BEFORE EATING

## 2018-07-05 ENCOUNTER — THERAPY VISIT (OUTPATIENT)
Dept: PHYSICAL THERAPY | Facility: CLINIC | Age: 53
End: 2018-07-05
Payer: COMMERCIAL

## 2018-07-05 DIAGNOSIS — M79.671 PAIN IN BOTH FEET: ICD-10-CM

## 2018-07-05 DIAGNOSIS — M79.672 PAIN IN BOTH FEET: ICD-10-CM

## 2018-07-05 PROCEDURE — 97110 THERAPEUTIC EXERCISES: CPT | Mod: GP | Performed by: PHYSICAL THERAPIST

## 2018-07-05 PROCEDURE — 97140 MANUAL THERAPY 1/> REGIONS: CPT | Mod: GP | Performed by: PHYSICAL THERAPIST

## 2018-07-05 NOTE — MR AVS SNAPSHOT
After Visit Summary   7/5/2018    Carolyn Briones    MRN: [id number]           Patient Information     Date Of Birth          1965        Visit Information        Provider Department      7/5/2018 4:10 PM Obdulia Guevara, PT [facility] Physical Therapy        Today's Diagnoses     Pain in both feet           Follow-ups after your visit        Your next 10 appointments already scheduled     Aug 09, 2018  3:15 PM CDT   (Arrive by 3:00 PM)   XR LUMBAR EPIDURAL INJECTION with MGXR3, MG NEURO RAD   [facility] ([facility])    [address] [place] [address] [phone]           For nerve root injection, please send or bring copies of any MRIs or other scans you have had.  Bring a list of your current medicines to your exam. (Include vitamins, minerals and over-the-counter medicines.) Leave your valuables at home.  Plan to have someone drive you home afterward.  Stop taking the following medicines (but talk to your doctor first):   If you take blood thinners, you may need to stop taking them a few days before treatment. Talk to your doctor before stopping these medicines.Stop taking Coumadin (warfarin) 3 days before treatment. Restart the day after treatment.   If you take Plavix, Ticlid, Pletal or Persantine, please ask your doctor if you should stop these medicines. You may need extra tests on the morning of your scan.   If you take Xarelto (Rivaroxaban), you may need to stop taking it 24 hours before treatment. Talk to your doctor before stopping this medicine. Restart the day after treatment.  You may take your other medicines as normal.  Stop all food and drink (including water) 3 hours before your test or treatment.  Please tell the doctor:   If you are allergic to X-ray dye (contrast fluid).   If you may be pregnant.  Injections take about 30 to 45 minutes. Most people spend up to 2 hours in the clinic or hospital.   Please call the Imaging Department at your exam site with any questions.            Aug 28, 2018  2:20 PM CDT   (Arrive by 2:05 PM)   RETURN ENDOCRINE with Cierra Fink MD   [facility] Endocrinology ([facility])    [address] [facility]  [address] [facility] [address] [phone]              Who to contact     If you have questions or need follow up information about today's clinic visit or your schedule please contact [facility] PHYSICAL THERAPY directly at [phone].  Normal or non-critical lab and imaging results will be communicated to you by Fundamo (Proprietary)hart, letter or phone within 4 business days after the clinic has received the results. If you do not hear from us within 7 days, please contact the clinic through Literablyt or phone. If you have a critical or abnormal lab result, we will notify you by phone as soon as possible.  Submit refill requests through SignStorey or call your pharmacy and they will forward the refill request to us. Please allow 3 business days for your refill to be completed.          Additional Information About Your Visit        Fundamo (Proprietary)hart Information     SignStorey gives you secure access to your electronic health record. If you see a primary care provider, you can also send messages to your care team and make appointments. If you have questions, please call your primary care clinic.  If you do not have a primary care provider, please call [phone] and they will assist you.        Care EveryWhere ID     This is your Care EveryWhere ID. This could be used by other organizations to access your [place] medical records  [id number]         Blood Pressure from Last 3 Encounters:   07/02/18 115/72   06/14/18 114/75   06/07/18 106/64    Weight from Last 3 Encounters:   03/26/18 66.5 kg (146 lb 9.6 oz)   12/07/17 66.7 kg (147 lb)   06/09/17 66.5 kg (146 lb 9.6 oz)              We Performed the Following     MANUAL THER TECH,1+REGIONS,EA 15 MIN      THERAPEUTIC EXERCISES        Primary Care Provider Office Phone # Fax #    Gentry Baer [phone]       [place] [address]        Equal Access to Services     CATHIEYONIS MARIAM : Hadii aad ku hadgasper Ruiz, waaxda luqadaha, qaybta kaalmada david, bernardo lehman laJohnlatrice winters. So [facility] [phone].    ATENCIÓN: Si habla español, tiene a lacey disposición servicios gratuitos de asistencia lingüística. Llame al [phone].    We comply with applicable federal civil rights laws and [place] laws. We do not discriminate on the basis of race, color, national origin, age, disability, sex, sexual orientation, or gender identity.            Thank you!     Thank you for choosing [facility] PHYSICAL THERAPY  for your care. Our goal is always to provide you with excellent care. Hearing back from our patients is one way we can continue to improve our services. Please take a few minutes to complete the written survey that you may receive in the mail after your visit with us. Thank you!             Your Updated Medication List - Protect others around you: Learn how to safely use, store and throw away your medicines at www.disposemymeds.org.          This list is accurate as of 7/5/18  5:40 PM.  Always use your most recent med list.                   Brand Name Dispense Instructions for use Diagnosis    atorvastatin 10 MG tablet    LIPITOR     Take 20 mg by mouth daily    Exposure to other ionizing radiation, subsequent encounter, Multiple thyroid nodules, Empty sella (H)       escitalopram 5 MG tablet    LEXAPRO     Take 5 mg by mouth        metoprolol succinate 25 MG 24 hr tablet    TOPROL-XL     take 1/2 tablet by mouth once daily        MULTI-VITAMINS PO      Take  by mouth daily.    Anemia, iron deficiency       PREVACID 30 MG CR capsule   Generic drug:  LANsoprazole      1 CAPSULE DAILY BEFORE EATING

## 2018-08-21 ENCOUNTER — OFFICE VISIT (OUTPATIENT)
Dept: ENDOCRINOLOGY | Facility: CLINIC | Age: 53
End: 2018-08-21
Payer: COMMERCIAL

## 2018-08-21 VITALS
DIASTOLIC BLOOD PRESSURE: 85 MMHG | SYSTOLIC BLOOD PRESSURE: 128 MMHG | HEIGHT: 64 IN | WEIGHT: 141.9 LBS | BODY MASS INDEX: 24.22 KG/M2 | HEART RATE: 96 BPM

## 2018-08-21 DIAGNOSIS — E04.1 THYROID NODULE: Primary | ICD-10-CM

## 2018-08-21 DIAGNOSIS — R00.2 PALPITATIONS: ICD-10-CM

## 2018-08-21 DIAGNOSIS — E04.1 THYROID NODULE: ICD-10-CM

## 2018-08-21 LAB
T4 FREE SERPL-MCNC: 1.03 NG/DL (ref 0.76–1.46)
TSH SERPL DL<=0.005 MIU/L-ACNC: 0.52 MU/L (ref 0.4–4)

## 2018-08-21 RX ORDER — LORAZEPAM 0.5 MG/1
0.5 TABLET ORAL PRN
Refills: 2 | COMMUNITY
Start: 2018-07-07

## 2018-08-21 ASSESSMENT — PAIN SCALES - GENERAL: PAINLEVEL: MILD PAIN (3)
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
## 2018-08-21 NOTE — MR AVS SNAPSHOT
"              After Visit Summary   8/21/2018    Carolyn Briones    MRN: [id number]           Patient Information     Date Of Birth          1965        Visit Information        Provider Department      8/21/2018 2:00 PM Cierra Fink MD [facility]        Today's Diagnoses     Thyroid nodule    -  1    Palpitations           Follow-ups after your visit        Who to contact     Please call your clinic at [phone] to:    Ask questions about your health    Make or cancel appointments    Discuss your medicines    Learn about your test results    Speak to your doctor            Additional Information About Your Visit        [email or web address] Information     [email or web address] gives you secure access to your electronic health record. If you see a primary care provider, you can also send messages to your care team and make appointments. If you have questions, please call your primary care clinic.  If you do not have a primary care provider, please call [phone] and they will assist you.      [email or web address] is an electronic gateway that provides easy, online access to your medical records. With [email or web address], you can request a clinic appointment, read your test results, renew a prescription or communicate with your care team.     To access your existing account, please contact your [facility] or call [phone] for assistance.        Care EveryWhere ID     This is your Care EveryWhere ID. This could be used by other organizations to access your [facility] medical records  [id number]        Your Vitals Were     Pulse Height BMI (Body Mass Index)             96 1.626 m (5' 4\") 24.36 kg/m2          Blood Pressure from Last 3 Encounters:   08/21/18 128/85   07/02/18 115/72   06/14/18 114/75    Weight from Last 3 Encounters:   08/21/18 64.4 kg (141 lb 14.4 oz)   03/26/18 66.5 kg (146 lb 9.6 oz)   12/07/17 66.7 kg (147 lb)               Primary Care Provider Office Phone # Fax #    Gentry Baer MD " [phone]       [address] [address]        Equal Access to Services     YKLE BECERRA : Hadii aad ku hadroxannerobyn Sara, brodyda radujuly, vish kamagalida david, bernardo elidain hayaan kennadriel lehman laJohnlatrice winters. [facility] [phone].    ATENCIÓN: Si habla español, tiene a lacey disposición servicios gratuitos de asistencia lingüística. Llame al [phone].    We comply with applicable federal civil rights laws and [place] laws. We do not discriminate on the basis of race, color, national origin, age, disability, sex, sexual orientation, or gender identity.            Thank you!     Thank you for choosing [facility]  for your care. Our goal is always to provide you with excellent care. Hearing back from our patients is one way we can continue to improve our services. Please take a few minutes to complete the written survey that you may receive in the mail after your visit with us. Thank you!             Your Updated Medication List - Protect others around you: Learn how to safely use, store and throw away your medicines at www.disposemymeds.org.          This list is accurate as of 8/21/18 11:59 PM.  Always use your most recent med list.                   Brand Name Dispense Instructions for use Diagnosis    atorvastatin 10 MG tablet    LIPITOR     Take 20 mg by mouth daily    Exposure to other ionizing radiation, subsequent encounter, Multiple thyroid nodules, Empty sella (H)       escitalopram 5 MG tablet    LEXAPRO     Take 5 mg by mouth        LORazepam 0.5 MG tablet    ATIVAN     Take 0.5 mg by mouth as needed    Thyroid nodule, Palpitations       metoprolol succinate 25 MG 24 hr tablet    TOPROL-XL          MULTI-VITAMINS PO      Take  by mouth daily.    Anemia, iron deficiency       PREVACID 30 MG CR capsule   Generic drug:  LANsoprazole      1 CAPSULE DAILY BEFORE EATING

## 2018-08-28 LAB — LAB SCANNED RESULT: NORMAL

## 2018-08-30 ENCOUNTER — RADIANT APPOINTMENT (OUTPATIENT)
Dept: ULTRASOUND IMAGING | Facility: CLINIC | Age: 53
End: 2018-08-30
Payer: COMMERCIAL

## 2018-08-30 DIAGNOSIS — R00.2 PALPITATIONS: ICD-10-CM

## 2018-08-30 DIAGNOSIS — E04.1 THYROID NODULE: ICD-10-CM

## 2018-08-30 PROCEDURE — 76536 US EXAM OF HEAD AND NECK: CPT | Performed by: STUDENT IN AN ORGANIZED HEALTH CARE EDUCATION/TRAINING PROGRAM

## 2019-01-31 NOTE — PROGRESS NOTES
Carolyn Briones was seen 4 times for evaluation and treatment.  Patient did not return for further treatment and current status is unknown.  Due to short treatment duration, no objective or functional changes were made.  Please see goal flow sheet from episode noted date below and initial evaluation for further information.  Linked Episodes   Type: Episode: Status: Noted: Resolved: Last update: Updated by:   PHYSICAL THERAPY B plantar fascitis 6/18/2018 Active 6/18/2018 7/5/2018  4:26 PM Obdulia Guevara, PT      Comments:

## 2019-03-01 ENCOUNTER — OFFICE VISIT (OUTPATIENT)
Dept: PODIATRY | Facility: CLINIC | Age: 54
End: 2019-03-01
Payer: COMMERCIAL

## 2019-03-01 VITALS — SYSTOLIC BLOOD PRESSURE: 111 MMHG | OXYGEN SATURATION: 100 % | HEART RATE: 63 BPM | DIASTOLIC BLOOD PRESSURE: 66 MMHG

## 2019-03-01 DIAGNOSIS — M77.8 CAPSULITIS OF FOOT, LEFT: Primary | ICD-10-CM

## 2019-03-01 DIAGNOSIS — M79.671 FOOT PAIN, BILATERAL: ICD-10-CM

## 2019-03-01 DIAGNOSIS — M77.8 CAPSULITIS OF RIGHT FOOT: ICD-10-CM

## 2019-03-01 DIAGNOSIS — M79.672 FOOT PAIN, BILATERAL: ICD-10-CM

## 2019-03-01 PROCEDURE — 99213 OFFICE O/P EST LOW 20 MIN: CPT | Performed by: PODIATRIST

## 2019-03-01 NOTE — NURSING NOTE
Carolyn Briones's chief complaint for this visit includes:  Chief Complaint   Patient presents with     Right Foot - Pain     Left Foot - Pain     PCP: Gentry Baer    Referring Provider:  No referring provider defined for this encounter.    /66 (BP Location: Left arm, Patient Position: Sitting, Cuff Size: Adult Regular)   Pulse 63   SpO2 100%   Data Unavailable     Do you need any medication refills at today's visit? No    Amrita Thompson LPN

## 2019-03-01 NOTE — LETTER
3/1/2019         RE: Carolyn Briones  [address]  [place] [address]        Dear Colleague,    Thank you for referring your patient, Carolyn Briones, to the [facility]. Please see a copy of my visit note below.    Past Medical History:   Diagnosis Date     Anemia      Empty sella (H)     only partially empty; possibly normal vaiant     Exposure to other ionizing radiation, subsequent encounter 1986    [place]     GERD (gastroesophageal reflux disease)      Hypercholesterolemia      Hypertension      Iron deficiency      Multiple thyroid nodules     subcm     Patient Active Problem List   Diagnosis     Anemia, iron deficiency     CARDIOVASCULAR SCREENING; LDL GOAL LESS THAN 160     Hand pain     Vitamin D deficiency     Female stress incontinence     Iron deficiency anemia due to chronic blood loss     Perimenopause     Stress at home     Thyroid nodule     Neck pain     Past Surgical History:   Procedure Laterality Date     APPENDECTOMY       COLONOSCOPY       DILATION AND CURETTAGE SUCTION  11/13/2012    Procedure: DILATION AND CURETTAGE SUCTION;  Suction Dilation & Curettage    (8 weeks);  Surgeon: Ken Rose MD;  [facility]     EXCISE GANGLION WRIST  4/10/2014    Procedure: EXCISE GANGLION WRIST;  Excision Ganglion Cyst, Left Wrist;  Surgeon: Ashley Garcia MD;  [facility]     OPERATIVE HYSTEROSCOPY WITH MORCELLATOR  1/19/2012    Procedure:OPERATIVE HYSTEROSCOPY WITH MORCELLATOR; Hysteroscopy & Polypectomy With Morcellator; Surgeon:KEN ROSE; [facility]     Social History     Socioeconomic History     Marital status:      Spouse name: Not on file     Number of children: Not on file     Years of education: Not on file     Highest education level: Not on file   Occupational History     Not on file   Social Needs     Financial resource strain: Not on file     Food insecurity:     Worry: Not on file     Inability: Not on file     Transportation needs:      Medical: Not on file     Non-medical: Not on file   Tobacco Use     Smoking status: Never Smoker     Smokeless tobacco: Never Used   Substance and Sexual Activity     Alcohol use: No     Drug use: No     Sexual activity: Yes     Partners: Male     Birth control/protection: Condom   Lifestyle     Physical activity:     Days per week: Not on file     Minutes per session: Not on file     Stress: Not on file   Relationships     Social connections:     Talks on phone: Not on file     Gets together: Not on file     Attends [employer] service: Not on file     Active member of club or organization: Not on file     Attends meetings of clubs or organizations: Not on file     Relationship status: Not on file     Intimate partner violence:     Fear of current or ex partner: Not on file     Emotionally abused: Not on file     Physically abused: Not on file     Forced sexual activity: Not on file   Other Topics Concern     Parent/sibling w/ CABG, MI or angioplasty before 65F 55M? Not Asked   Social History Narrative     Not on file     Family History   Problem Relation Age of Onset     Lipids Mother      Cancer Mother         desmoplastic mesothelioma     C.A.D. Father         CABG age 65     Thyroid Disease No family hx of      Thyroid Cancer No family hx of      SUBJECTIVE FINDINGS:  A 53-year-old female returns to clinic for foot pain bilaterally.  She relates that in 2016 she got pain that started in the back of the heel along the Achilles tendon when she just woke up 1 morning it started to hurt.  She relates no injuries. Then, it was kind of on and off again.  She states it coincided with the start of her statin medications.  She has seen different physicians for this.  She states now it hurts mostly on the ball of the foot.  She relates it is symmetrical and the ball of the foot hurts more than the heels.  She relates it is better today.  It is better and worse at different times.  She relates she has been treated for  sciatica.  She has seen Dr. Thomas.  I reviewed those notes.  She relates she is not sure if the physical therapy for the feet helped or not.  She relates no other specific relieving or aggravating factors.  No injuries.  She relates no specific joint pains other than she has general stiffness and soreness kind of all over the place at times.  I cannot really get an answer from her on whether she has any morning stiffness or not.        OBJECTIVE FINDINGS:  DP and PT is 2/4 bilaterally.  She has functional hallux limitus bilaterally with dorsal first MPJ prominence.  No erythema, no drainage, no odor, no calor bilaterally.  She relates she does get some edema on the tibialis anterior tendon course on the left at times.  She has mild pain on palpation of the plantar second MPJ on the right foot.  Otherwise, there is no pain on palpation and no pain on range of motion bilaterally.  No erythema, no drainage, no odor, no calor bilaterally.  She relates she gets pain in the posterior heel, along the Achilles tendon, plantar heel and across all of the MPJs when it occurs.  Sometimes it is severe pain.        ASSESSMENT/PLAN:  Foot pain bilaterally.  She has functional hallux limitus with a bunion present.  She is getting capsulitis in the MPJs of the right and left foot.  Plantar fascitis and Achilles tendinopathy/bursitis bilaterally.  Diagnosis and treatment options discussed with her.  I gave her a referral to Rheumatology to help rule out any rheumatologic disease contributing.  She has a history of a vitamin D deficiency.  She will follow up with her primary physician to recheck this.  MRI of the right and left foot and ankle ordered and use discussed with her.  She requests that this be done.  Prescription for physical therapy given and use discussed with her.  She will return to clinic and see me in about 2 weeks.     Previous notes and imaging reviewed.      Again, thank you for allowing me to participate in the  care of your patient.        Sincerely,        Maximiliano Willett DPM

## 2019-03-01 NOTE — PATIENT INSTRUCTIONS
Thanks for coming today.  [facility]  [address] [place] [address]    To schedule future appointments in Ortho Clinic, you may call [phone].    To schedule ordered imaging by your provider:   Call [facility] [phone]    To schedule an injection ordered by your provider:  Call [facility] Injection scheduling line: [phone]  [facility] available online at:  [email or web address]    Please call if any further questions or concerns ([phone]).  Clinic hours 8 am to 5 pm.    Return to clinic (call) if symptoms worsen or fail to improve.

## 2019-03-01 NOTE — PROGRESS NOTES
Past Medical History:   Diagnosis Date     Anemia      Empty sella (H)     only partially empty; possibly normal vaiant     Exposure to other ionizing radiation, subsequent encounter 1986    [place]     GERD (gastroesophageal reflux disease)      Hypercholesterolemia      Hypertension      Iron deficiency      Multiple thyroid nodules     subcm     Patient Active Problem List   Diagnosis     Anemia, iron deficiency     CARDIOVASCULAR SCREENING; LDL GOAL LESS THAN 160     Hand pain     Vitamin D deficiency     Female stress incontinence     Iron deficiency anemia due to chronic blood loss     Perimenopause     Stress at home     Thyroid nodule     Neck pain     Past Surgical History:   Procedure Laterality Date     APPENDECTOMY       COLONOSCOPY       DILATION AND CURETTAGE SUCTION  11/13/2012    Procedure: DILATION AND CURETTAGE SUCTION;  Suction Dilation & Curettage    (8 weeks);  Surgeon: Ken Rose MD;  [facility]: [facility]     EXCISE GANGLION WRIST  4/10/2014    Procedure: EXCISE GANGLION WRIST;  Excision Ganglion Cyst, Left Wrist;  Surgeon: Ashley Garcia MD;  [facility]     OPERATIVE HYSTEROSCOPY WITH MORCELLATOR  1/19/2012    Procedure:OPERATIVE HYSTEROSCOPY WITH MORCELLATOR; Hysteroscopy & Polypectomy With Morcellator; Surgeon:KEN ROSE; Location:[facility]     Social History     Socioeconomic History     Marital status:      Spouse name: Not on file     Number of children: Not on file     Years of education: Not on file     Highest education level: Not on file   Occupational History     Not on file   Social Needs     Financial resource strain: Not on file     Food insecurity:     Worry: Not on file     Inability: Not on file     Transportation needs:     Medical: Not on file     Non-medical: Not on file   Tobacco Use     Smoking status: Never Smoker     Smokeless tobacco: Never Used   Substance and Sexual Activity     Alcohol use: No     Drug use: No     Sexual activity: Yes     Partners:  Male     Birth control/protection: Condom   Lifestyle     Physical activity:     Days per week: Not on file     Minutes per session: Not on file     Stress: Not on file   Relationships     Social connections:     Talks on phone: Not on file     Gets together: Not on file     Attends Church service: Not on file     Active member of club or organization: Not on file     Attends meetings of clubs or organizations: Not on file     Relationship status: Not on file     Intimate partner violence:     Fear of current or ex partner: Not on file     Emotionally abused: Not on file     Physically abused: Not on file     Forced sexual activity: Not on file   Other Topics Concern     Parent/sibling w/ CABG, MI or angioplasty before 65F 55M? Not Asked   Social History Narrative     Not on file     Family History   Problem Relation Age of Onset     Lipids Mother      Cancer Mother         desmoplastic mesothelioma     C.A.D. Father         CABG age 65     Thyroid Disease No family hx of      Thyroid Cancer No family hx of      SUBJECTIVE FINDINGS:  A 53-year-old female returns to clinic for foot pain bilaterally.  She relates that in 2016 she got pain that started in the back of the heel along the Achilles tendon when she just woke up 1 morning it started to hurt.  She relates no injuries. Then, it was kind of on and off again.  She states it coincided with the start of her statin medications.  She has seen different physicians for this.  She states now it hurts mostly on the ball of the foot.  She relates it is symmetrical and the ball of the foot hurts more than the heels.  She relates it is better today.  It is better and worse at different times.  She relates she has been treated for sciatica.  She has seen Dr. Thomas.  I reviewed those notes.  She relates she is not sure if the physical therapy for the feet helped or not.  She relates no other specific relieving or aggravating factors.  No injuries.  She relates no specific  joint pains other than she has general stiffness and soreness kind of all over the place at times.  I cannot really get an answer from her on whether she has any morning stiffness or not.        OBJECTIVE FINDINGS:  DP and PT is 2/4 bilaterally.  She has functional hallux limitus bilaterally with dorsal first MPJ prominence.  No erythema, no drainage, no odor, no calor bilaterally.  She relates she does get some edema on the tibialis anterior tendon course on the left at times.  She has mild pain on palpation of the plantar second MPJ on the right foot.  Otherwise, there is no pain on palpation and no pain on range of motion bilaterally.  No erythema, no drainage, no odor, no calor bilaterally.  She relates she gets pain in the posterior heel, along the Achilles tendon, plantar heel and across all of the MPJs when it occurs.  Sometimes it is severe pain.        ASSESSMENT/PLAN:  Foot pain bilaterally.  She has functional hallux limitus with a bunion present.  She is getting capsulitis in the MPJs of the right and left foot.  Plantar fascitis and Achilles tendinopathy/bursitis bilaterally.  Diagnosis and treatment options discussed with her.  I gave her a referral to Rheumatology to help rule out any rheumatologic disease contributing.  She has a history of a vitamin D deficiency.  She will follow up with her primary physician to recheck this.  MRI of the right and left foot and ankle ordered and use discussed with her.  She requests that this be done.  Prescription for physical therapy given and use discussed with her.  She will return to clinic and see me in about 2 weeks.     Previous notes and imaging reviewed.

## 2019-03-04 ENCOUNTER — ANCILLARY PROCEDURE (OUTPATIENT)
Dept: MRI IMAGING | Facility: CLINIC | Age: 54
End: 2019-03-04
Attending: PODIATRIST
Payer: COMMERCIAL

## 2019-03-04 DIAGNOSIS — M77.8 CAPSULITIS OF RIGHT FOOT: ICD-10-CM

## 2019-03-04 DIAGNOSIS — M79.671 FOOT PAIN, BILATERAL: ICD-10-CM

## 2019-03-04 DIAGNOSIS — M77.8 CAPSULITIS OF FOOT, LEFT: ICD-10-CM

## 2019-03-04 DIAGNOSIS — M79.672 FOOT PAIN, BILATERAL: ICD-10-CM

## 2019-03-15 ENCOUNTER — OFFICE VISIT (OUTPATIENT)
Dept: PODIATRY | Facility: CLINIC | Age: 54
End: 2019-03-15
Payer: COMMERCIAL

## 2019-03-15 VITALS — SYSTOLIC BLOOD PRESSURE: 95 MMHG | DIASTOLIC BLOOD PRESSURE: 62 MMHG | HEART RATE: 64 BPM | OXYGEN SATURATION: 95 %

## 2019-03-15 DIAGNOSIS — M79.671 FOOT PAIN, BILATERAL: ICD-10-CM

## 2019-03-15 DIAGNOSIS — M77.8 CAPSULITIS OF FOOT, LEFT: Primary | ICD-10-CM

## 2019-03-15 DIAGNOSIS — M79.672 FOOT PAIN, BILATERAL: ICD-10-CM

## 2019-03-15 DIAGNOSIS — M77.8 CAPSULITIS OF RIGHT FOOT: ICD-10-CM

## 2019-03-15 DIAGNOSIS — M72.2 BILATERAL PLANTAR FASCIITIS: ICD-10-CM

## 2019-03-15 PROCEDURE — 99213 OFFICE O/P EST LOW 20 MIN: CPT | Performed by: PODIATRIST

## 2019-03-15 NOTE — NURSING NOTE
Carolyn Briones's chief complaint for this visit includes:  Chief Complaint   Patient presents with     RECHECK     recheck foot pain     PCP: Gentry Baer    Referring Provider:  No referring provider defined for this encounter.    BP 95/62   Pulse 64   SpO2 95%   Data Unavailable     Do you need any medication refills at today's visit? no

## 2019-03-15 NOTE — LETTER
3/15/2019         RE: Carolyn Briones  [address]  [place] [address]        Dear Colleague,    Thank you for referring your patient, Carolyn Briones, to the [facility]. Please see a copy of my visit note below.    Past Medical History:   Diagnosis Date     Anemia      Empty sella (H)     only partially empty; possibly normal vaiant     Exposure to other ionizing radiation, subsequent encounter 1986    [place]     GERD (gastroesophageal reflux disease)      Hypercholesterolemia      Hypertension      Iron deficiency      Multiple thyroid nodules     subcm     Patient Active Problem List   Diagnosis     Anemia, iron deficiency     CARDIOVASCULAR SCREENING; LDL GOAL LESS THAN 160     Hand pain     Vitamin D deficiency     Female stress incontinence     Iron deficiency anemia due to chronic blood loss     Perimenopause     Stress at home     Thyroid nodule     Neck pain     Past Surgical History:   Procedure Laterality Date     APPENDECTOMY       COLONOSCOPY       DILATION AND CURETTAGE SUCTION  11/13/2012    Procedure: DILATION AND CURETTAGE SUCTION;  Suction Dilation & Curettage    (8 weeks);  Surgeon: Ken Rose MD;  [facility]     EXCISE GANGLION WRIST  4/10/2014    Procedure: EXCISE GANGLION WRIST;  Excision Ganglion Cyst, Left Wrist;  Surgeon: Ashley Garcia MD;  [facility]     OPERATIVE HYSTEROSCOPY WITH MORCELLATOR  1/19/2012    Procedure:OPERATIVE HYSTEROSCOPY WITH MORCELLATOR; Hysteroscopy & Polypectomy With Morcellator; Surgeon:KEN ROSE; [facility]     Social History     Socioeconomic History     Marital status:      Spouse name: Not on file     Number of children: Not on file     Years of education: Not on file     Highest education level: Not on file   Occupational History     Not on file   Social Needs     Financial resource strain: Not on file     Food insecurity:     Worry: Not on file     Inability: Not on file     Transportation needs:      Medical: Not on file     Non-medical: Not on file   Tobacco Use     Smoking status: Never Smoker     Smokeless tobacco: Never Used   Substance and Sexual Activity     Alcohol use: No     Drug use: No     Sexual activity: Yes     Partners: Male     Birth control/protection: Condom   Lifestyle     Physical activity:     Days per week: Not on file     Minutes per session: Not on file     Stress: Not on file   Relationships     Social connections:     Talks on phone: Not on file     Gets together: Not on file     Attends Hoahaoism service: Not on file     Active member of club or organization: Not on file     Attends meetings of clubs or organizations: Not on file     Relationship status: Not on file     Intimate partner violence:     Fear of current or ex partner: Not on file     Emotionally abused: Not on file     Physically abused: Not on file     Forced sexual activity: Not on file   Other Topics Concern     Parent/sibling w/ CABG, MI or angioplasty before 65F 55M? Not Asked   Social History Narrative     Not on file     Family History   Problem Relation Age of Onset     Lipids Mother      Cancer Mother         desmoplastic mesothelioma     C.A.D. Father         CABG age 65     Thyroid Disease No family hx of      Thyroid Cancer No family hx of      SUBJECTIVE FINDINGS:  52-year-old female returns to clinic for foot pain bilaterally, functional hallux limitus with bunion, capsulitis, plantar fasciitis, Achilles tendinopathy and bursitis.  She relates it is doing okay.  She still gets pain.  She has not done the physical therapy yet but she is scheduled for it.  She is scheduled to see Rheumatology.  She did get her MRIs done.      OBJECTIVE FINDINGS:  MRI results reviewed with the patient in clinic today.  Skin is dry and intact.  She has some edema along the peroneal tendon course, right greater than left.  No erythema bilaterally.        ASSESSMENT/PLAN:  Foot pain bilaterally, functional hallux limitus and bunion  present, capsulitis MPJs bilaterally, peroneal tendinopathy bilaterally.  Her plantar fasciitis is improved.  Achilles tendinitis is doing better too.  Diagnosis and treatment options discussed with patient.  The MRI results were reviewed with her in clinic today.  Patient is casted for custom foot orthotics.  She was given the phone number and address to [facility] to [facility] those up.  Also advised Spenco over-the-counter insoles as an alternative.  I advised her to do physical therapy.  She will return to clinic and see me as needed or if she wants further treatment options.         Again, thank you for allowing me to participate in the care of your patient.        Sincerely,        Maximiliano Willett DPM

## 2019-03-15 NOTE — PROGRESS NOTES
Past Medical History:   Diagnosis Date     Anemia      Empty sella (H)     only partially empty; possibly normal vaiant     Exposure to other ionizing radiation, subsequent encounter 1986    [place]     GERD (gastroesophageal reflux disease)      Hypercholesterolemia      Hypertension      Iron deficiency      Multiple thyroid nodules     subcm     Patient Active Problem List   Diagnosis     Anemia, iron deficiency     CARDIOVASCULAR SCREENING; LDL GOAL LESS THAN 160     Hand pain     Vitamin D deficiency     Female stress incontinence     Iron deficiency anemia due to chronic blood loss     Perimenopause     Stress at home     Thyroid nodule     Neck pain     Past Surgical History:   Procedure Laterality Date     APPENDECTOMY       COLONOSCOPY       DILATION AND CURETTAGE SUCTION  11/13/2012    Procedure: DILATION AND CURETTAGE SUCTION;  Suction Dilation & Curettage    (8 weeks);  Surgeon: Ken Rose MD;  [facility]     EXCISE GANGLION WRIST  4/10/2014    Procedure: EXCISE GANGLION WRIST;  Excision Ganglion Cyst, Left Wrist;  Surgeon: Ashley Garcia MD;  [facility]     OPERATIVE HYSTEROSCOPY WITH MORCELLATOR  1/19/2012    Procedure:OPERATIVE HYSTEROSCOPY WITH MORCELLATOR; Hysteroscopy & Polypectomy With Morcellator; Surgeon:KEN ROSE; [facility]     Social History     Socioeconomic History     Marital status:      Spouse name: Not on file     Number of children: Not on file     Years of education: Not on file     Highest education level: Not on file   Occupational History     Not on file   Social Needs     Financial resource strain: Not on file     Food insecurity:     Worry: Not on file     Inability: Not on file     Transportation needs:     Medical: Not on file     Non-medical: Not on file   Tobacco Use     Smoking status: Never Smoker     Smokeless tobacco: Never Used   Substance and Sexual Activity     Alcohol use: No     Drug use: No     Sexual activity: Yes     Partners:  Male     Birth control/protection: Condom   Lifestyle     Physical activity:     Days per week: Not on file     Minutes per session: Not on file     Stress: Not on file   Relationships     Social connections:     Talks on phone: Not on file     Gets together: Not on file     Attends Voodoo service: Not on file     Active member of club or organization: Not on file     Attends meetings of clubs or organizations: Not on file     Relationship status: Not on file     Intimate partner violence:     Fear of current or ex partner: Not on file     Emotionally abused: Not on file     Physically abused: Not on file     Forced sexual activity: Not on file   Other Topics Concern     Parent/sibling w/ CABG, MI or angioplasty before 65F 55M? Not Asked   Social History Narrative     Not on file     Family History   Problem Relation Age of Onset     Lipids Mother      Cancer Mother         desmoplastic mesothelioma     C.A.D. Father         CABG age 65     Thyroid Disease No family hx of      Thyroid Cancer No family hx of      SUBJECTIVE FINDINGS:  52-year-old female returns to clinic for foot pain bilaterally, functional hallux limitus with bunion, capsulitis, plantar fasciitis, Achilles tendinopathy and bursitis.  She relates it is doing okay.  She still gets pain.  She has not done the physical therapy yet but she is scheduled for it.  She is scheduled to see Rheumatology.  She did get her MRIs done.      OBJECTIVE FINDINGS:  MRI results reviewed with the patient in clinic today.  Skin is dry and intact.  She has some edema along the peroneal tendon course, right greater than left.  No erythema bilaterally.        ASSESSMENT/PLAN:  Foot pain bilaterally, functional hallux limitus and bunion present, capsulitis MPJs bilaterally, peroneal tendinopathy bilaterally.  Her plantar fasciitis is improved.  Achilles tendinitis is doing better too.  Diagnosis and treatment options discussed with patient.  The MRI results were reviewed  with her in clinic today.  Patient is casted for custom foot orthotics.  She was given the phone number and address to [facility] to pick those up.  Also advised Spenco over-the-counter insoles as an alternative.  I advised her to do physical therapy.  She will return to clinic and see me as needed or if she wants further treatment options.

## 2019-04-04 ENCOUNTER — DOCUMENTATION ONLY (OUTPATIENT)
Dept: ORTHOPEDICS | Facility: CLINIC | Age: 54
End: 2019-04-04

## 2019-04-04 ENCOUNTER — OFFICE VISIT (OUTPATIENT)
Dept: RHEUMATOLOGY | Facility: CLINIC | Age: 54
End: 2019-04-04
Attending: PODIATRIST
Payer: COMMERCIAL

## 2019-04-04 VITALS
HEART RATE: 59 BPM | BODY MASS INDEX: 25.06 KG/M2 | WEIGHT: 146.8 LBS | DIASTOLIC BLOOD PRESSURE: 67 MMHG | OXYGEN SATURATION: 97 % | HEIGHT: 64 IN | SYSTOLIC BLOOD PRESSURE: 93 MMHG

## 2019-04-04 DIAGNOSIS — M79.671 BILATERAL FOOT PAIN: Primary | ICD-10-CM

## 2019-04-04 DIAGNOSIS — M79.672 BILATERAL FOOT PAIN: Primary | ICD-10-CM

## 2019-04-04 LAB
ALT SERPL W P-5'-P-CCNC: 27 U/L (ref 0–50)
AST SERPL W P-5'-P-CCNC: 17 U/L (ref 0–45)
CREAT SERPL-MCNC: 0.67 MG/DL (ref 0.52–1.04)
CRP SERPL-MCNC: <2.9 MG/L (ref 0–8)
DEPRECATED CALCIDIOL+CALCIFEROL SERPL-MC: 28 UG/L (ref 20–75)
ERYTHROCYTE [DISTWIDTH] IN BLOOD BY AUTOMATED COUNT: 12.7 % (ref 10–15)
ERYTHROCYTE [SEDIMENTATION RATE] IN BLOOD BY WESTERGREN METHOD: 11 MM/H (ref 0–30)
GFR SERPL CREATININE-BSD FRML MDRD: >90 ML/MIN/{1.73_M2}
HCT VFR BLD AUTO: 40.4 % (ref 35–47)
HGB BLD-MCNC: 13.2 G/DL (ref 11.7–15.7)
MCH RBC QN AUTO: 31.4 PG (ref 26.5–33)
MCHC RBC AUTO-ENTMCNC: 32.7 G/DL (ref 31.5–36.5)
MCV RBC AUTO: 96 FL (ref 78–100)
PLATELET # BLD AUTO: 253 10E9/L (ref 150–450)
RBC # BLD AUTO: 4.21 10E12/L (ref 3.8–5.2)
RHEUMATOID FACT SER NEPH-ACNC: <20 IU/ML (ref 0–20)
WBC # BLD AUTO: 5.7 10E9/L (ref 4–11)

## 2019-04-04 PROCEDURE — 86038 ANTINUCLEAR ANTIBODIES: CPT | Performed by: STUDENT IN AN ORGANIZED HEALTH CARE EDUCATION/TRAINING PROGRAM

## 2019-04-04 PROCEDURE — 81374 HLA I TYPING 1 ANTIGEN LR: CPT | Performed by: STUDENT IN AN ORGANIZED HEALTH CARE EDUCATION/TRAINING PROGRAM

## 2019-04-04 PROCEDURE — 86431 RHEUMATOID FACTOR QUANT: CPT | Performed by: STUDENT IN AN ORGANIZED HEALTH CARE EDUCATION/TRAINING PROGRAM

## 2019-04-04 PROCEDURE — 82306 VITAMIN D 25 HYDROXY: CPT | Performed by: STUDENT IN AN ORGANIZED HEALTH CARE EDUCATION/TRAINING PROGRAM

## 2019-04-04 PROCEDURE — 99000 SPECIMEN HANDLING OFFICE-LAB: CPT | Performed by: STUDENT IN AN ORGANIZED HEALTH CARE EDUCATION/TRAINING PROGRAM

## 2019-04-04 PROCEDURE — 86200 CCP ANTIBODY: CPT | Performed by: STUDENT IN AN ORGANIZED HEALTH CARE EDUCATION/TRAINING PROGRAM

## 2019-04-04 PROCEDURE — 85027 COMPLETE CBC AUTOMATED: CPT | Performed by: STUDENT IN AN ORGANIZED HEALTH CARE EDUCATION/TRAINING PROGRAM

## 2019-04-04 PROCEDURE — 84450 TRANSFERASE (AST) (SGOT): CPT | Performed by: STUDENT IN AN ORGANIZED HEALTH CARE EDUCATION/TRAINING PROGRAM

## 2019-04-04 PROCEDURE — 84460 ALANINE AMINO (ALT) (SGPT): CPT | Performed by: STUDENT IN AN ORGANIZED HEALTH CARE EDUCATION/TRAINING PROGRAM

## 2019-04-04 PROCEDURE — 99204 OFFICE O/P NEW MOD 45 MIN: CPT | Performed by: STUDENT IN AN ORGANIZED HEALTH CARE EDUCATION/TRAINING PROGRAM

## 2019-04-04 PROCEDURE — 82565 ASSAY OF CREATININE: CPT | Performed by: STUDENT IN AN ORGANIZED HEALTH CARE EDUCATION/TRAINING PROGRAM

## 2019-04-04 PROCEDURE — 86140 C-REACTIVE PROTEIN: CPT | Performed by: STUDENT IN AN ORGANIZED HEALTH CARE EDUCATION/TRAINING PROGRAM

## 2019-04-04 PROCEDURE — 82955 ASSAY OF G6PD ENZYME: CPT | Mod: 90 | Performed by: STUDENT IN AN ORGANIZED HEALTH CARE EDUCATION/TRAINING PROGRAM

## 2019-04-04 PROCEDURE — 85652 RBC SED RATE AUTOMATED: CPT | Performed by: STUDENT IN AN ORGANIZED HEALTH CARE EDUCATION/TRAINING PROGRAM

## 2019-04-04 PROCEDURE — 36415 COLL VENOUS BLD VENIPUNCTURE: CPT | Performed by: STUDENT IN AN ORGANIZED HEALTH CARE EDUCATION/TRAINING PROGRAM

## 2019-04-04 ASSESSMENT — MIFFLIN-ST. JEOR: SCORE: 1256.91

## 2019-04-04 ASSESSMENT — PAIN SCALES - GENERAL: PAINLEVEL: MODERATE PAIN (4)

## 2019-04-04 NOTE — PROGRESS NOTES
Rheumatology Clinic Visit     Carolyn Briones MRN# [id number]   YOB: 1965 Age: 53 year old     Date of Visit: Apr 4, 2019  Primary care provider: Gentry Baer          Assessment and Plan:   Assessment     -- B/l foot pain  -- MRI b/l foot - 3/2019 - ? pericapsular fibrosis, ganglion cyst  -- MRI right ankle - Tenosynovitis, Chronic Plantar fasciitis  -- MRI left ankle - Small ankle and subtalar joint effusion. Small talonavicular effusion.    Ms Briones is 52 yo female seen in clinic for evaluation of b/l foot pain.     Bilateral foot pain : She has pain in bilateral feet since 2016.  She has seen podiatrist and had MRI of the feet and ankles twice which has mostly shown changes consistent with tenosynovitis, capsulitis, plantar fasciitis.  Small effusion is seen in left tibiotalar, subtalar joint and the right second third fourth MTP joints.    She denies any pain in her hands, wrists, elbows, shoulders, knees.  She does not have any morning stiffness.  On physical exam she does not have any synovitis and tenderness on palpation over the joints.  Even palpation over bilateral ankles and MTP joints are nontender.    Based on the physical exam inflammatory arthritis diagnosis is difficult to make.  She has no inflammation over the joints.  Her MRI results raise concern about possibility of spondyloarthropathies especially due to presence of chronic plantar fasciitis, tenosynovitis changes.  She has no history of psoriasis, IBD, dactylitis, uveitis.    Due to chronicity of her pain, will evaluate complete blood work including rheumatoid factor, anti-CCP, inflammatory markers, HLA-B27.  The patient do not want to take prednisone at this time since the diagnosis of inflammatory arthritis is not clear.  She can use ibuprofen and Tylenol on as-needed basis if further workup is done.    If pain does not improve we can give prednisone trial and treat it as spondyloarthropathy to see if it  helps.    Plan    1.  Blood tests: As described above    2.  Return to clinic in 4 weeks             Active Problem List:     Patient Active Problem List    Diagnosis Date Noted     Stress at home 06/09/2017     Priority: Medium     Thyroid nodule 06/09/2017     Priority: Medium     Neck pain 06/09/2017     Priority: Medium     Perimenopause 09/27/2016     Priority: Medium     Iron deficiency anemia due to chronic blood loss 12/23/2015     Priority: Medium     Female stress incontinence 12/30/2013     Priority: Medium     Vitamin D deficiency 04/09/2013     Priority: Medium     Hand pain 01/29/2013     Priority: Medium     CARDIOVASCULAR SCREENING; LDL GOAL LESS THAN 160 10/31/2010     Priority: Medium     Anemia, iron deficiency 12/17/2009     Priority: Medium            History of Present Illness:   Carolyn Briones is 53 year old female with PMH of thyroid nodule, HTN seen in the clinic in consultation at request of her podiatrist Maximiliano Willett for evaluation of b/l foot pain.     She reports that in summer 2016 she woke up one day with pain in bilateral Achilles tendons.  Later the pain went into her heels.  She was seen by a podiatrist in December 2017 and was diagnosed with plantar fasciitis.  She had MRI of bilateral feet and ankle done in March 2018 which showed evidence of mild plantar fasciitis, tenosynovitis of peroneal longus and brevis, intermetatarsal bursitis.  She was referred to physical therapy and did not help.  She reports that the pain has migrated now into her toes.  It does not hurt when she is sitting but on walking she has some pain.  Pain is 3/10 in intensity, she does not use ibuprofen or Tylenol to help with the pain.    Other than the bilateral foot pain she denies any pain in her hands, wrists, elbows, shoulders, knees.  She has no morning stiffness when she wakes up in the morning.  She has chronic low back pain and complains of right-sided radicular pain. She was seen by Dr. Thomas in  orthopedics and had MRI of the lumbar spine which did show lumbar degenerative disc disease and was referred to do physical therapy.  She has done physical therapy but still complains of radicular pain in the right leg.  Dr. Thomas recommended to get epidural spine injection which she is not that he had.      She denies any history of psoriasis, inflammatory bowel disease, uveitis, dactylitis.  She has no family history of rheumatoid arthritis or psoriatic arthritis.  She also denies any history of raynaud's, malar rash, pleurisy, sicca symptoms, oral/nasal mucosal sores, dysphagia, recurrent sinusitis.  She did mention that in September 2018 she developed a skin rash which looks like insect bites.  She was seen by dermatologist at [facility] in [place] and had a skin biopsy which was not conclusive of any diagnosis.  Eventually the skin rash cleared on its own.     She also has history of chronic GERD and is on Prevacid for the last 20 years. 1-1/2 years ago she lost her sense of smell and was seen by the ENT physician.  She had MRI of the brain which was normal.  Most likely cause of her insomnia was believed to be related to sinusitis or nasal polyps.    Other than that she has history of hypertension, hyperlipidemia.            Review of Systems:   Review Of Systems  Constitutional: denies fever, chills, night sweats and weight loss.  Skin: No skin rash.  Eyes: No dryness or irritation in eyes. No episode of eye inflammation or redness.   Ears/Nose/Throat: no recurrent sinus infections.  Respiratory: No shortness of breath, dyspnea on exertion, cough, or hemoptysis  Cardiovascular: no chest pain or palpitations.  Gastrointestinal: no nausea, vomiting, abdominal pain.  Normal bowel movements.  Genitourinary: no dysuria, frequency  or hematuria.  Musculoskeletal: as in HPI  Neurologic: no numbness, tingling.  Psychiatric: no mood disorders.  Hematologic/Lymphatic/Immunologic: no history of easy  bruising, petechia or purpura.  No abnormal bleeding.   Endocrine: + h/o thyroid disease or Diabetes.                  Past Medical History:     Past Medical History:   Diagnosis Date     Anemia      Empty sella (H)     only partially empty; possibly normal vaiant     Exposure to other ionizing radiation, subsequent encounter 1986    [place]     GERD (gastroesophageal reflux disease)      Hypercholesterolemia      Hypertension      Iron deficiency      Multiple thyroid nodules     subcm     Past Surgical History:   Procedure Laterality Date     APPENDECTOMY       COLONOSCOPY       DILATION AND CURETTAGE SUCTION  11/13/2012    Procedure: DILATION AND CURETTAGE SUCTION;  Suction Dilation & Curettage    (8 weeks);  Surgeon: Ken Rose MD;  Location: [facility] OR     EXCISE GANGLION WRIST  4/10/2014    Procedure: EXCISE GANGLION WRIST;  Excision Ganglion Cyst, Left Wrist;  Surgeon: Ashley Garcia MD;  Location: [facility]     OPERATIVE HYSTEROSCOPY WITH MORCELLATOR  1/19/2012    Procedure:OPERATIVE HYSTEROSCOPY WITH MORCELLATOR; Hysteroscopy & Polypectomy With Morcellator; Surgeon:KEN ROSE; Location:[facility] OR            Social History:     Social History     Occupational History     Not on file   Tobacco Use     Smoking status: Never Smoker     Smokeless tobacco: Never Used   Substance and Sexual Activity     Alcohol use: No     Drug use: No     Sexual activity: Yes     Partners: Male     Birth control/protection: Condom            Family History:     Family History   Problem Relation Age of Onset     Lipids Mother      Cancer Mother         desmoplastic mesothelioma     C.A.D. Father         CABG age 65     Thyroid Disease No family hx of      Thyroid Cancer No family hx of             Allergies:     Allergies   Allergen Reactions     Iron Dextran Shortness Of Breath     Doxycycline Other (See Comments)     High blood pressure     Cipro [Ciprofloxacin] Nausea     Liquid Adhesive Itching     Sulphadimidine [Sulfa  "Drugs] Nausea     Adhesive Tape Rash            Medications:     Current Outpatient Medications   Medication Sig Dispense Refill     atorvastatin (LIPITOR) 10 MG tablet Take 20 mg by mouth daily        escitalopram (LEXAPRO) 5 MG tablet Take 5 mg by mouth       LORazepam (ATIVAN) 0.5 MG tablet Take 0.5 mg by mouth as needed  2     metoprolol (TOPROL-XL) 25 MG 24 hr tablet   0     Multiple Vitamin (MULTI-VITAMINS PO) Take  by mouth daily.       PREVACID 30 MG OR CPDR 1 CAPSULE DAILY BEFORE EATING              Physical Exam:   Blood pressure 93/67, pulse 59, height 1.619 m (5' 3.75\"), weight 66.6 kg (146 lb 12.8 oz), SpO2 97 %.  Wt Readings from Last 4 Encounters:   04/04/19 66.6 kg (146 lb 12.8 oz)   08/21/18 64.4 kg (141 lb 14.4 oz)   03/26/18 66.5 kg (146 lb 9.6 oz)   12/07/17 66.7 kg (147 lb)       Constitutional: well-developed, appearing stated age; cooperative  Eyes: nl EOM, PERRLA, vision, conjunctiva, sclera  ENT: nl external ears, nose, hearing, lips, teeth, gums, throat  No mucous membrane lesions, normal saliva pool  Neck: no mass or thyroid enlargement  Resp: lungs clear to auscultation, nl to palpation  CV: RRR, no murmurs, rubs or gallops, no edema  GI: no ABD mass or tenderness, no HSM  : not tested  Lymph: no cervical, supraclavicular, inguinal or epitrochlear nodes    MS: All TMJ, neck, shoulder, elbow, wrist, MCP/PIP/DIP, spine, hip, knee, ankle, and foot MTP/IP joints were examined.     -- she does not have any synovitis and tenderness on palpation over the joints.  Even palpation over bilateral ankles and MTP joints are nontender.    -- No dactylitis,  tenosynovitis, enthesopathy.    Skin: no nail pitting, alopecia, rash, nodules or lesions  Neuro: nl cranial nerves, strength, sensation, DTRs.   Psych: nl judgement, orientation, memory, affect.         Data:     Results for orders placed or performed in visit on 03/04/19   MR Foot Left w/o Contrast    Narrative    MR left foot without  contrast " 3/5/2019 8:02 AM    History: Foot pain, chronic, etiol unknown, initial exam; Capsulitis  of foot, left; Capsulitis of right foot; Foot pain, bilateral; Foot  pain, bilateral    Techniques: Multiplanar multisequence imaging of the left foot was  obtained without administration of intravenous contrast.    Comparison: MRI 3/22/2018    Findings:  External marker at the dorsum of the foot at the level of the second  metatarsophalangeal joint. Mild dorsal subcutaneous soft tissue edema  underlying the marker.    Bones    No fracture, marrow contusion or marrow infiltrative changes.    Joints and periarticular soft tissue    Degenerative changes of the first metatarsophalangeal joint with  associated subchondral edema/cystic changes at the medial aspect of  the proximal phalanx, similar to prior.    Edema involving the lateral hallux sesamoid with mild decreased T1  signal changes, similar to prior.    Joint effusion: Small joint effusion of the first through fourth  metatarsophalangeal joints.    Plantar plates: Intersesamoidal ligament and sesamoidal phalangeal  ligaments of the first metatarsophalangeal joints are intact. Plantar  plates of the second through fifth toe at metatarsophalangeal joints  are grossly intact.    Intermetatarsal spaces:     Effacement of first and third interspace at the plantar aspect with  associated small intermetatarsal bursal fluid. Findings may be  secondary to pericapsular fibrosis. Trace fluid in the second  interspace.    Ligaments and Tendons    Lisfranc interosseous ligament: Intact.    Tendons: The visualized courses of flexor and extensor tendons are  intact.     Muscles    No atrophy or edema.    Mild nonspecific plantar soft tissue edema at the second proximal  interphalangeal joint.      Impression    Impression:  1. Findings which may represent pericapsular fibrosis of the first and  third intermetatarsal interspaces.   2. Moderate degenerative changes of the first  metatarsophalangeal  joint with areas of full-thickness cartilage loss, unchanged.  3. Lateral hallux sesamoiditis. This may be secondary to inflammation,  ischemia, posttraumatic or idiopathic.    I have personally reviewed the examination and initial interpretation  and I agree with the findings.    CORRINA HAYWOOD       Recent Labs   Lab Test 01/31/17  1416 10/27/16  1013 07/05/16  1411   WBC 5.5 5.9 6.2   RBC 4.28 4.40 4.32   HGB 13.4 14.0 13.8   HCT 40.4 41.0 41.2   MCV 94 93 95   RDW 12.9 12.8 12.4    316 264   ALBUMIN 4.1 4.0 4.1   BUN 14 13 11      Recent Labs   Lab Test 08/21/18  1736 01/31/17  1416 10/27/16  1013 09/27/16  1759  02/06/14  1308   TSH 0.52 0.79 0.80 1.29   < > 0.94   T4 1.03  --   --  0.95  --  0.83    < > = values in this interval not displayed.     Hemoglobin   Date Value Ref Range Status   01/31/2017 13.4 11.7 - 15.7 g/dL Final   10/27/2016 14.0 11.7 - 15.7 g/dL Final   07/05/2016 13.8 11.7 - 15.7 g/dL Final     Urea Nitrogen   Date Value Ref Range Status   01/31/2017 14 7 - 30 mg/dL Final   10/27/2016 13 7 - 30 mg/dL Final   07/05/2016 11 7 - 30 mg/dL Final     Sed Rate   Date Value Ref Range Status   07/05/2016 10 0 - 20 mm/h Final   04/09/2013 8 0 - 20 mm/h Final     AST   Date Value Ref Range Status   01/31/2017 10 0 - 45 U/L Final   10/27/2016 15 0 - 45 U/L Final   07/05/2016 17 0 - 45 U/L Final     Albumin   Date Value Ref Range Status   01/31/2017 4.1 3.4 - 5.0 g/dL Final   10/27/2016 4.0 3.4 - 5.0 g/dL Final   07/05/2016 4.1 3.4 - 5.0 g/dL Final     Alkaline Phosphatase   Date Value Ref Range Status   01/31/2017 73 40 - 150 U/L Final   10/27/2016 87 40 - 150 U/L Final   07/05/2016 72 40 - 150 U/L Final     ALT   Date Value Ref Range Status   01/31/2017 20 0 - 50 U/L Final   10/27/2016 25 0 - 50 U/L Final   07/05/2016 25 0 - 50 U/L Final     Recent Labs   Lab Test 08/21/18  1736 01/31/17  1416 10/27/16  1013  07/05/16  1411   WBC  --  5.5 5.9  --  6.2   HGB  --  13.4 14.0   --  13.8   HCT  --  40.4 41.0  --  41.2   MCV  --  94 93  --  95   PLT  --  306 316  --  264   BUN  --  14 13  --  11   TSH 0.52 0.79 0.80   < > 0.86   AST  --  10 15  --  17   ALT  --  20 25  --  25   ALKPHOS  --  73 87  --  72    < > = values in this interval not displayed.       Reviewed Rheumatology lab flowsheet    Macario An MD  [facility]  Department of Rheumatology & Autoimmune Disorders  [facility]: [phone]   Pager - [phone]

## 2019-04-04 NOTE — NURSING NOTE
"Carolyn Briones's goals for this visit include:   She requests these members of her care team be copied on today's visit information:     PCP: Gentry Baer    Referring Provider:  Maximiliano Willett DPM  [address] [place] [id number]    BP 93/67   Pulse 59   Ht 1.619 m (5' 3.75\")   Wt 66.6 kg (146 lb 12.8 oz)   SpO2 97%   BMI 25.40 kg/m      "

## 2019-04-04 NOTE — PROGRESS NOTES
S: Pt. arrived to our facility today for a fitting for his/her new custom, foot orthotics.    O/Goals: The objective/Goals of orthotics are to help with Foot pain bilaterally, functional hallux limitus and bunion present, capsulitis MPJs bilaterally, peroneal tendinopathy bilaterally.     A: I have fit pt. with bilateral, custom; subortholnene based foot orthotics with medium met pads fabricated by [employer]. FO s have been trimmed to fit into her current footwear. Pt. has been instructed with proper donning, doffing, cleaning, and skin care. A [employer] instructional sheet has been given to pt. and gone through thoroughly. Pt. has ambulated with the orthotics and is satisfied with the overall fit and comfort.    P: Pt. has been instructed to contact our facility with any future questions and/or concerns.    Primo GARSIA [facility] Licensed Orthotist , [facility] Certified Orthotist

## 2019-04-04 NOTE — LETTER
4/4/2019      RE: Carolyn Briones  [address]  [place] [address]     Dear Colleague,    Thank you for referring your patient, Carolyn Briones, to the [facility]. Please see a copy of my visit note below.    Rheumatology Clinic Visit     Carolyn Briones MRN# [id number]   YOB: 1965 Age: 53 year old     Date of Visit: Apr 4, 2019  Primary care provider: Gentry Baer          Assessment and Plan:   Assessment     -- B/l foot pain  -- MRI b/l foot - 3/2019 - ? pericapsular fibrosis, ganglion cyst  -- MRI right ankle - Tenosynovitis, Chronic Plantar fasciitis  -- MRI left ankle - Small ankle and subtalar joint effusion. Small talonavicular effusion.    Ms Briones is 52 yo female seen in clinic for evaluation of b/l foot pain.     Bilateral foot pain : She has pain in bilateral feet since 2016.  She has seen podiatrist and had MRI of the feet and ankles twice which has mostly shown changes consistent with tenosynovitis, capsulitis, plantar fasciitis.  Small effusion is seen in left tibiotalar, subtalar joint and the right second third fourth MTP joints.    She denies any pain in her hands, wrists, elbows, shoulders, knees.  She does not have any morning stiffness.  On physical exam she does not have any synovitis and tenderness on palpation over the joints.  Even palpation over bilateral ankles and MTP joints are nontender.    Based on the physical exam inflammatory arthritis diagnosis is difficult to make.  She has no inflammation over the joints.  Her MRI results raise concern about possibility of spondyloarthropathies especially due to presence of chronic plantar fasciitis, tenosynovitis changes.  She has no history of psoriasis, IBD, dactylitis, uveitis.    Due to chronicity of her pain, will evaluate complete blood work including rheumatoid factor, anti-CCP, inflammatory markers, HLA-B27.  The patient do not want to take prednisone at this time since the diagnosis of inflammatory  arthritis is not clear.  She can use ibuprofen and Tylenol on as-needed basis if further workup is done.    If pain does not improve we can give prednisone trial and treat it as spondyloarthropathy to see if it helps.    Plan    1.  Blood tests: As described above    2.  Return to clinic in 4 weeks             Active Problem List:     Patient Active Problem List    Diagnosis Date Noted     Stress at home 06/09/2017     Priority: Medium     Thyroid nodule 06/09/2017     Priority: Medium     Neck pain 06/09/2017     Priority: Medium     Perimenopause 09/27/2016     Priority: Medium     Iron deficiency anemia due to chronic blood loss 12/23/2015     Priority: Medium     Female stress incontinence 12/30/2013     Priority: Medium     Vitamin D deficiency 04/09/2013     Priority: Medium     Hand pain 01/29/2013     Priority: Medium     CARDIOVASCULAR SCREENING; LDL GOAL LESS THAN 160 10/31/2010     Priority: Medium     Anemia, iron deficiency 12/17/2009     Priority: Medium            History of Present Illness:   Carolyn Briones is 53 year old female with PMH of thyroid nodule, HTN seen in the clinic in consultation at request of her podiatrist Maximiliano Willett for evaluation of b/l foot pain.     She reports that in summer 2016 she woke up one day with pain in bilateral Achilles tendons.  Later the pain went into her heels.  She was seen by a podiatrist in December 2017 and was diagnosed with plantar fasciitis.  She had MRI of bilateral feet and ankle done in March 2018 which showed evidence of mild plantar fasciitis, tenosynovitis of peroneal longus and brevis, intermetatarsal bursitis.  She was referred to physical therapy and did not help.  She reports that the pain has migrated now into her toes.  It does not hurt when she is sitting but on walking she has some pain.  Pain is 3/10 in intensity, she does not use ibuprofen or Tylenol to help with the pain.    Other than the bilateral foot pain she denies any pain in her  hands, wrists, elbows, shoulders, knees.  She has no morning stiffness when she wakes up in the morning.  She has chronic low back pain and complains of right-sided radicular pain. She was seen by Dr. Thomas in orthopedics and had MRI of the lumbar spine which did show lumbar degenerative disc disease and was referred to do physical therapy.  She has done physical therapy but still complains of radicular pain in the right leg.  Dr. Thomas recommended to get epidural spine injection which she is not that he had.      She denies any history of psoriasis, inflammatory bowel disease, uveitis, dactylitis.  She has no family history of rheumatoid arthritis or psoriatic arthritis.  She also denies any history of raynaud's, malar rash, pleurisy, sicca symptoms, oral/nasal mucosal sores, dysphagia, recurrent sinusitis.  She did mention that in September 2018 she developed a skin rash which looks like insect bites.  She was seen by dermatologist at [facility] in [place] and had a skin biopsy which was not conclusive of any diagnosis.  Eventually the skin rash cleared on its own.     She also has history of chronic GERD and is on Prevacid for the last 20 years. 1-1/2 years ago she lost her sense of smell and was seen by the ENT physician.  She had MRI of the brain which was normal.  Most likely cause of her insomnia was believed to be related to sinusitis or nasal polyps.    Other than that she has history of hypertension, hyperlipidemia.            Review of Systems:   Review Of Systems  Constitutional: denies fever, chills, night sweats and weight loss.  Skin: No skin rash.  Eyes: No dryness or irritation in eyes. No episode of eye inflammation or redness.   Ears/Nose/Throat: no recurrent sinus infections.  Respiratory: No shortness of breath, dyspnea on exertion, cough, or hemoptysis  Cardiovascular: no chest pain or palpitations.  Gastrointestinal: no nausea, vomiting, abdominal pain.  Normal bowel  movements.  Genitourinary: no dysuria, frequency  or hematuria.  Musculoskeletal: as in HPI  Neurologic: no numbness, tingling.  Psychiatric: no mood disorders.  Hematologic/Lymphatic/Immunologic: no history of easy bruising, petechia or purpura.  No abnormal bleeding.   Endocrine: + h/o thyroid disease or Diabetes.                  Past Medical History:     Past Medical History:   Diagnosis Date     Anemia      Empty sella (H)     only partially empty; possibly normal vaiant     Exposure to other ionizing radiation, subsequent encounter 1986 [place]     GERD (gastroesophageal reflux disease)      Hypercholesterolemia      Hypertension      Iron deficiency      Multiple thyroid nodules     subcm     Past Surgical History:   Procedure Laterality Date     APPENDECTOMY       COLONOSCOPY       DILATION AND CURETTAGE SUCTION  11/13/2012    Procedure: DILATION AND CURETTAGE SUCTION;  Suction Dilation & Curettage    (8 weeks);  Surgeon: Ken Rose MD;  Location: [facility] OR     EXCISE GANGLION WRIST  4/10/2014    Procedure: EXCISE GANGLION WRIST;  Excision Ganglion Cyst, Left Wrist;  Surgeon: Ashley Garcia MD;  Location: [facility]     OPERATIVE HYSTEROSCOPY WITH MORCELLATOR  1/19/2012    Procedure:OPERATIVE HYSTEROSCOPY WITH MORCELLATOR; Hysteroscopy & Polypectomy With Morcellator; Surgeon:KEN ROSE; Location:[facility] OR            Social History:     Social History     Occupational History     Not on file   Tobacco Use     Smoking status: Never Smoker     Smokeless tobacco: Never Used   Substance and Sexual Activity     Alcohol use: No     Drug use: No     Sexual activity: Yes     Partners: Male     Birth control/protection: Condom            Family History:     Family History   Problem Relation Age of Onset     Lipids Mother      Cancer Mother         desmoplastic mesothelioma     C.A.D. Father         CABG age 65     Thyroid Disease No family hx of      Thyroid Cancer No family hx of             Allergies:  "    Allergies   Allergen Reactions     Iron Dextran Shortness Of Breath     Doxycycline Other (See Comments)     High blood pressure     Cipro [Ciprofloxacin] Nausea     Liquid Adhesive Itching     Sulphadimidine [Sulfa Drugs] Nausea     Adhesive Tape Rash            Medications:     Current Outpatient Medications   Medication Sig Dispense Refill     atorvastatin (LIPITOR) 10 MG tablet Take 20 mg by mouth daily        escitalopram (LEXAPRO) 5 MG tablet Take 5 mg by mouth       LORazepam (ATIVAN) 0.5 MG tablet Take 0.5 mg by mouth as needed  2     metoprolol (TOPROL-XL) 25 MG 24 hr tablet   0     Multiple Vitamin (MULTI-VITAMINS PO) Take  by mouth daily.       PREVACID 30 MG OR CPDR 1 CAPSULE DAILY BEFORE EATING              Physical Exam:   Blood pressure 93/67, pulse 59, height 1.619 m (5' 3.75\"), weight 66.6 kg (146 lb 12.8 oz), SpO2 97 %.  Wt Readings from Last 4 Encounters:   04/04/19 66.6 kg (146 lb 12.8 oz)   08/21/18 64.4 kg (141 lb 14.4 oz)   03/26/18 66.5 kg (146 lb 9.6 oz)   12/07/17 66.7 kg (147 lb)       Constitutional: well-developed, appearing stated age; cooperative  Eyes: nl EOM, PERRLA, vision, conjunctiva, sclera  ENT: nl external ears, nose, hearing, lips, teeth, gums, throat  No mucous membrane lesions, normal saliva pool  Neck: no mass or thyroid enlargement  Resp: lungs clear to auscultation, nl to palpation  CV: RRR, no murmurs, rubs or gallops, no edema  GI: no ABD mass or tenderness, no HSM  : not tested  Lymph: no cervical, supraclavicular, inguinal or epitrochlear nodes    MS: All TMJ, neck, shoulder, elbow, wrist, MCP/PIP/DIP, spine, hip, knee, ankle, and foot MTP/IP joints were examined.     -- she does not have any synovitis and tenderness on palpation over the joints.  Even palpation over bilateral ankles and MTP joints are nontender.    -- No dactylitis,  tenosynovitis, enthesopathy.    Skin: no nail pitting, alopecia, rash, nodules or lesions  Neuro: nl cranial nerves, strength, " sensation, DTRs.   Psych: nl judgement, orientation, memory, affect.         Data:     Results for orders placed or performed in visit on 03/04/19   MR Foot Left w/o Contrast    Narrative    MR left foot without  contrast 3/5/2019 8:02 AM    History: Foot pain, chronic, etiol unknown, initial exam; Capsulitis  of foot, left; Capsulitis of right foot; Foot pain, bilateral; Foot  pain, bilateral    Techniques: Multiplanar multisequence imaging of the left foot was  obtained without administration of intravenous contrast.    Comparison: MRI 3/22/2018    Findings:  External marker at the dorsum of the foot at the level of the second  metatarsophalangeal joint. Mild dorsal subcutaneous soft tissue edema  underlying the marker.    Bones    No fracture, marrow contusion or marrow infiltrative changes.    Joints and periarticular soft tissue    Degenerative changes of the first metatarsophalangeal joint with  associated subchondral edema/cystic changes at the medial aspect of  the proximal phalanx, similar to prior.    Edema involving the lateral hallux sesamoid with mild decreased T1  signal changes, similar to prior.    Joint effusion: Small joint effusion of the first through fourth  metatarsophalangeal joints.    Plantar plates: Intersesamoidal ligament and sesamoidal phalangeal  ligaments of the first metatarsophalangeal joints are intact. Plantar  plates of the second through fifth toe at metatarsophalangeal joints  are grossly intact.    Intermetatarsal spaces:     Effacement of first and third interspace at the plantar aspect with  associated small intermetatarsal bursal fluid. Findings may be  secondary to pericapsular fibrosis. Trace fluid in the second  interspace.    Ligaments and Tendons    Lisfranc interosseous ligament: Intact.    Tendons: The visualized courses of flexor and extensor tendons are  intact.     Muscles    No atrophy or edema.    Mild nonspecific plantar soft tissue edema at the second  proximal  interphalangeal joint.      Impression    Impression:  1. Findings which may represent pericapsular fibrosis of the first and  third intermetatarsal interspaces.   2. Moderate degenerative changes of the first metatarsophalangeal  joint with areas of full-thickness cartilage loss, unchanged.  3. Lateral hallux sesamoiditis. This may be secondary to inflammation,  ischemia, posttraumatic or idiopathic.    I have personally reviewed the examination and initial interpretation  and I agree with the findings.    CORRINA HAYWOOD       Recent Labs   Lab Test 01/31/17  1416 10/27/16  1013 07/05/16  1411   WBC 5.5 5.9 6.2   RBC 4.28 4.40 4.32   HGB 13.4 14.0 13.8   HCT 40.4 41.0 41.2   MCV 94 93 95   RDW 12.9 12.8 12.4    316 264   ALBUMIN 4.1 4.0 4.1   BUN 14 13 11      Recent Labs   Lab Test 08/21/18  1736 01/31/17  1416 10/27/16  1013 09/27/16  1759  02/06/14  1308   TSH 0.52 0.79 0.80 1.29   < > 0.94   T4 1.03  --   --  0.95  --  0.83    < > = values in this interval not displayed.     Hemoglobin   Date Value Ref Range Status   01/31/2017 13.4 11.7 - 15.7 g/dL Final   10/27/2016 14.0 11.7 - 15.7 g/dL Final   07/05/2016 13.8 11.7 - 15.7 g/dL Final     Urea Nitrogen   Date Value Ref Range Status   01/31/2017 14 7 - 30 mg/dL Final   10/27/2016 13 7 - 30 mg/dL Final   07/05/2016 11 7 - 30 mg/dL Final     Sed Rate   Date Value Ref Range Status   07/05/2016 10 0 - 20 mm/h Final   04/09/2013 8 0 - 20 mm/h Final     AST   Date Value Ref Range Status   01/31/2017 10 0 - 45 U/L Final   10/27/2016 15 0 - 45 U/L Final   07/05/2016 17 0 - 45 U/L Final     Albumin   Date Value Ref Range Status   01/31/2017 4.1 3.4 - 5.0 g/dL Final   10/27/2016 4.0 3.4 - 5.0 g/dL Final   07/05/2016 4.1 3.4 - 5.0 g/dL Final     Alkaline Phosphatase   Date Value Ref Range Status   01/31/2017 73 40 - 150 U/L Final   10/27/2016 87 40 - 150 U/L Final   07/05/2016 72 40 - 150 U/L Final     ALT   Date Value Ref Range Status   01/31/2017 20  0 - 50 U/L Final   10/27/2016 25 0 - 50 U/L Final   07/05/2016 25 0 - 50 U/L Final     Recent Labs   Lab Test 08/21/18  1736 01/31/17  1416 10/27/16  1013  07/05/16  1411   WBC  --  5.5 5.9  --  6.2   HGB  --  13.4 14.0  --  13.8   HCT  --  40.4 41.0  --  41.2   MCV  --  94 93  --  95   PLT  --  306 316  --  264   BUN  --  14 13  --  11   TSH 0.52 0.79 0.80   < > 0.86   AST  --  10 15  --  17   ALT  --  20 25  --  25   ALKPHOS  --  73 87  --  72    < > = values in this interval not displayed.       Reviewed Rheumatology lab flowsheet    Macario An MD  [facility]  Department of Rheumatology & Autoimmune Disorders  [facility]: [phone]   Pager - [phone]        Again, thank you for allowing me to participate in the care of your patient.      Sincerely,    Macario An MD

## 2019-04-04 NOTE — PATIENT INSTRUCTIONS
-- Will get tests for Rheumatoid arthritis. Your Physical exam is not suggestive of inflammatory arthritis.     -- Will discuss with radiologist about your MRI result    -- RTC in 6 weeks.

## 2019-04-05 LAB
ANA SER QL IF: NEGATIVE
CCP AB SER IA-ACNC: 1 U/ML

## 2019-04-06 LAB — G6PD RBC-CCNC: 11.3 U/G HB (ref 9.9–16.6)

## 2019-04-09 LAB
B LOCUS: NORMAL
B27TEST METHOD: NORMAL

## 2019-04-10 ENCOUNTER — TELEPHONE (OUTPATIENT)
Dept: RHEUMATOLOGY | Facility: CLINIC | Age: 54
End: 2019-04-10

## 2019-04-10 DIAGNOSIS — M79.671 FOOT PAIN, BILATERAL: Primary | ICD-10-CM

## 2019-04-10 DIAGNOSIS — M79.672 FOOT PAIN, BILATERAL: Primary | ICD-10-CM

## 2019-04-10 RX ORDER — PREDNISONE 5 MG/1
TABLET ORAL
Qty: 90 TABLET | Refills: 2 | Status: SHIPPED | OUTPATIENT
Start: 2019-04-10 | End: 2021-03-02

## 2019-04-10 NOTE — TELEPHONE ENCOUNTER
Her blood tests for Rheumatoid arthritis is negative. She has normal ESR,CRP. YULY is negative which makes YULY associated autoimmune disease unlikely. Her exam did not show any inflammation over the joints to suggest Rheumatoid arthritis. But her MRI showed plantar fascitis, achillis tendinitis and small effusion in the ankle joints.     If she has not tried prednisone, I will send in prednisone taper to see if it helps. If it does then trial of Sulfasalazine can be made.

## 2019-04-10 NOTE — TELEPHONE ENCOUNTER
[facility] Call Center    Phone Message    May a detailed message be left on voicemail: yes    Reason for Call: Requesting Results   Name/type of test: Lab Results   Date of test: 4/4/19    Pt requesting a call back ASAP about these results.          Action Taken: Message routed to:  Adult Clinics: Rheumatology p [phone]

## 2019-04-11 NOTE — TELEPHONE ENCOUNTER
Reviewed results and recommendations with Carolyn. She has hesitancy starting prednisone based on the normal lab results and requests a f/u appt to discuss with MD. Appt made for 4/17, patient defers prednisone until that visit. She will research sulfasalazine before OV.

## 2019-04-17 ENCOUNTER — OFFICE VISIT (OUTPATIENT)
Dept: RHEUMATOLOGY | Facility: CLINIC | Age: 54
End: 2019-04-17
Payer: COMMERCIAL

## 2019-04-17 ENCOUNTER — TELEPHONE (OUTPATIENT)
Dept: RHEUMATOLOGY | Facility: CLINIC | Age: 54
End: 2019-04-17

## 2019-04-17 VITALS
DIASTOLIC BLOOD PRESSURE: 70 MMHG | HEART RATE: 61 BPM | WEIGHT: 146 LBS | OXYGEN SATURATION: 93 % | BODY MASS INDEX: 24.92 KG/M2 | HEIGHT: 64 IN | SYSTOLIC BLOOD PRESSURE: 104 MMHG

## 2019-04-17 DIAGNOSIS — M79.671 BILATERAL FOOT PAIN: Primary | ICD-10-CM

## 2019-04-17 DIAGNOSIS — M79.672 BILATERAL FOOT PAIN: Primary | ICD-10-CM

## 2019-04-17 PROCEDURE — 99213 OFFICE O/P EST LOW 20 MIN: CPT | Performed by: STUDENT IN AN ORGANIZED HEALTH CARE EDUCATION/TRAINING PROGRAM

## 2019-04-17 ASSESSMENT — PAIN SCALES - GENERAL: PAINLEVEL: MODERATE PAIN (4)

## 2019-04-17 ASSESSMENT — MIFFLIN-ST. JEOR: SCORE: 1253.28

## 2019-04-17 NOTE — PROGRESS NOTES
Rheumatology Clinic Visit     Carolyn Briones MRN# [id number]   YOB: 1965 Age: 53 year old     Date of Visit: April 17, 2019  Primary care provider: Gentry Baer          Assessment and Plan:   Assessment     -- B/l foot pain  -- MRI b/l foot - 3/2019 - ? pericapsular fibrosis, ganglion cyst  -- MRI right ankle - Tenosynovitis, Chronic Plantar fasciitis  -- MRI left ankle - Small ankle and subtalar joint effusion. Small talonavicular effusion.    Ms Briones is 52 yo female seen in clinic for evaluation of b/l foot pain.     Bilateral foot pain : She has pain in bilateral feet since 2016.  She has seen podiatrist and had MRI of the feet and ankles twice which has mostly shown changes consistent with tenosynovitis, capsulitis, plantar fasciitis.  Small effusion is seen in left tibiotalar, subtalar joint and the right second third fourth MTP joints.    She denies any pain in other joints like hands, wrists, elbows, shoulders, knees.  She does not have any morning stiffness.  On physical exam she does not have any synovitis and tenderness on palpation over the joints.  Even palpation over bilateral ankles and MTP joints are nontender.    Based on the physical exam inflammatory arthritis diagnosis in unlikely.  She has no inflammation over the joints.  Her MRI results raised concern about possibility of spondyloarthropathies especially due to presence of chronic plantar fasciitis, tenosynovitis changes but she has no history of psoriasis, IBD, dactylitis, uveitis.    Her blood work done on last visit showed negative rheumatoid factor, anti-CCP, YULY, normal inflammatory markers, HLA-B27. Patient does not want to take prednisone trial and would like a referral to [facility] for further evaluation.      Based on today's exam I agree not to use prednisone due to absence of tenderness over ankles, MTP joints and other joints and normal blood tests.     I will send in referral to [facility].    Plan     1.   [facility] referral given     2.  Return on as needed basis.            Active Problem List:     Patient Active Problem List    Diagnosis Date Noted     Stress at home 06/09/2017     Priority: Medium     Thyroid nodule 06/09/2017     Priority: Medium     Neck pain 06/09/2017     Priority: Medium     Perimenopause 09/27/2016     Priority: Medium     Iron deficiency anemia due to chronic blood loss 12/23/2015     Priority: Medium     Female stress incontinence 12/30/2013     Priority: Medium     Vitamin D deficiency 04/09/2013     Priority: Medium     Hand pain 01/29/2013     Priority: Medium     CARDIOVASCULAR SCREENING; LDL GOAL LESS THAN 160 10/31/2010     Priority: Medium     Anemia, iron deficiency 12/17/2009     Priority: Medium            History of Present Illness:   Carolyn Briones is 53 year old female with PMH of thyroid nodule, HTN seen in the clinic in consultation at request of her podiatrist Maximiliano Willett for evaluation of b/l foot pain.     April 17, 2019 - She c/o pain in her feet on walking. Has mild swelling around her right ankle. Autoimmune work up done on last visit was normal - normal YULY, RF, ACPA, ESR, CRP, HLA B27. No other joints are hurting. No skin rashes, eye inflammation.     History from initial visit : She reports that in summer 2016 she woke up one day with pain in bilateral Achilles tendons.  Later the pain went into her heels.  She was seen by a podiatrist in December 2017 and was diagnosed with plantar fasciitis.  She had MRI of bilateral feet and ankle done in March 2018 which showed evidence of mild plantar fasciitis, tenosynovitis of peroneal longus and brevis, intermetatarsal bursitis.  She was referred to physical therapy and did not help.  She reports that the pain has migrated now into her toes.  It does not hurt when she is sitting but on walking she has some pain.  Pain is 3/10 in intensity, she does not use ibuprofen or Tylenol to help with the pain.    Other than the  bilateral foot pain she denies any pain in her hands, wrists, elbows, shoulders, knees.  She has no morning stiffness when she wakes up in the morning.  She has chronic low back pain and complains of right-sided radicular pain. She was seen by Dr. Thomas in orthopedics and had MRI of the lumbar spine which did show lumbar degenerative disc disease and was referred to do physical therapy.  She has done physical therapy but still complains of radicular pain in the right leg.  Dr. Thomas recommended to get epidural spine injection which she is not that he had.      She denies any history of psoriasis, inflammatory bowel disease, uveitis, dactylitis.  She has no family history of rheumatoid arthritis or psoriatic arthritis.  She also denies any history of raynaud's, malar rash, pleurisy, sicca symptoms, oral/nasal mucosal sores, dysphagia, recurrent sinusitis.  She did mention that in September 2018 she developed a skin rash which looks like insect bites.  She was seen by dermatologist at [facility] in [place] and had a skin biopsy which was not conclusive of any diagnosis.  Eventually the skin rash cleared on its own.     She also has history of chronic GERD and is on Prevacid for the last 20 years. 1-1/2 years ago she lost her sense of smell and was seen by the ENT physician.  She had MRI of the brain which was normal.  Most likely cause of her insomnia was believed to be related to sinusitis or nasal polyps.    Other than that she has history of hypertension, hyperlipidemia.            Review of Systems:   Review Of Systems  Constitutional: denies fever, chills, night sweats and weight loss.  Skin: No skin rash.  Eyes: No dryness or irritation in eyes. No episode of eye inflammation or redness.   Ears/Nose/Throat: no recurrent sinus infections.  Respiratory: No shortness of breath, dyspnea on exertion, cough, or hemoptysis  Cardiovascular: no chest pain or palpitations.  Gastrointestinal: no nausea,  vomiting, abdominal pain.  Normal bowel movements.  Genitourinary: no dysuria, frequency  or hematuria.  Musculoskeletal: as in HPI  Neurologic: no numbness, tingling.  Psychiatric: no mood disorders.  Hematologic/Lymphatic/Immunologic: no history of easy bruising, petechia or purpura.  No abnormal bleeding.   Endocrine: + h/o thyroid disease or Diabetes.                  Past Medical History:     Past Medical History:   Diagnosis Date     Anemia      Empty sella (H)     only partially empty; possibly normal vaiant     Exposure to other ionizing radiation, subsequent encounter 1986 [place]     GERD (gastroesophageal reflux disease)      Hypercholesterolemia      Hypertension      Iron deficiency      Multiple thyroid nodules     subcm     Past Surgical History:   Procedure Laterality Date     APPENDECTOMY       COLONOSCOPY       DILATION AND CURETTAGE SUCTION  11/13/2012    Procedure: DILATION AND CURETTAGE SUCTION;  Suction Dilation & Curettage    (8 weeks);  Surgeon: Ken Rose MD;  Location: [facility] OR     EXCISE GANGLION WRIST  4/10/2014    Procedure: EXCISE GANGLION WRIST;  Excision Ganglion Cyst, Left Wrist;  Surgeon: Ashley Garcia MD;  Location: [facility]     OPERATIVE HYSTEROSCOPY WITH MORCELLATOR  1/19/2012    Procedure:OPERATIVE HYSTEROSCOPY WITH MORCELLATOR; Hysteroscopy & Polypectomy With Morcellator; Surgeon:KEN ROSE; Location:[facility] OR            Social History:     Social History     Occupational History     Not on file   Tobacco Use     Smoking status: Never Smoker     Smokeless tobacco: Never Used   Substance and Sexual Activity     Alcohol use: No     Drug use: No     Sexual activity: Yes     Partners: Male     Birth control/protection: Condom            Family History:     Family History   Problem Relation Age of Onset     Lipids Mother      Cancer Mother         desmoplastic mesothelioma     C.A.D. Father         CABG age 65     Thyroid Disease No family hx of      Thyroid Cancer No  "family hx of             Allergies:     Allergies   Allergen Reactions     Iron Dextran Shortness Of Breath     Doxycycline Other (See Comments)     High blood pressure     Cipro [Ciprofloxacin] Nausea     Liquid Adhesive Itching     Sulphadimidine [Sulfa Drugs] Nausea     Adhesive Tape Rash            Medications:     Current Outpatient Medications   Medication Sig Dispense Refill     atorvastatin (LIPITOR) 10 MG tablet Take 20 mg by mouth daily        escitalopram (LEXAPRO) 5 MG tablet Take 5 mg by mouth       LORazepam (ATIVAN) 0.5 MG tablet Take 0.5 mg by mouth as needed  2     metoprolol (TOPROL-XL) 25 MG 24 hr tablet   0     Multiple Vitamin (MULTI-VITAMINS PO) Take  by mouth daily.       PREVACID 30 MG OR CPDR 1 CAPSULE DAILY BEFORE EATING       predniSONE (DELTASONE) 5 MG tablet 4tab=20 mg qd x 5 days, 3tab=15 mg qd x 5 days, 2tab=10 mg qd x 5 days, 1 tab=5 mg qd x 5 days then stop. (Patient not taking: Reported on 4/17/2019.) 90 tablet 2            Physical Exam:   Blood pressure 104/70, pulse 61, height 1.619 m (5' 3.75\"), weight 66.2 kg (146 lb), SpO2 93 %.  Wt Readings from Last 4 Encounters:   04/17/19 66.2 kg (146 lb)   04/04/19 66.6 kg (146 lb 12.8 oz)   08/21/18 64.4 kg (141 lb 14.4 oz)   03/26/18 66.5 kg (146 lb 9.6 oz)       Constitutional: well-developed, appearing stated age; cooperative  Eyes: nl EOM, PERRLA, vision, conjunctiva, sclera  ENT: nl external ears, nose, hearing, lips, teeth, gums, throat  No mucous membrane lesions, normal saliva pool  Neck: no mass or thyroid enlargement  Resp: lungs clear to auscultation, nl to palpation  CV: RRR, no murmurs, rubs or gallops, no edema  GI: no ABD mass or tenderness, no HSM  : not tested  Lymph: no cervical, supraclavicular, inguinal or epitrochlear nodes    MS: All TMJ, neck, shoulder, elbow, wrist, MCP/PIP/DIP, spine, hip, knee, ankle, and foot MTP/IP joints were examined.   -- No active synovitis or deformity. Full ROM.  Normal  strength. "   -- No dactylitis,  tenosynovitis, enthesopathy.    Skin: no nail pitting, alopecia, rash, nodules or lesions  Neuro: nl cranial nerves, strength, sensation, DTRs.   Psych: nl judgement, orientation, memory, affect.         Data:     Results for orders placed or performed in visit on 04/04/19   Erythrocyte sedimentation rate auto   Result Value Ref Range    Sed Rate 11 0 - 30 mm/h   CRP inflammation   Result Value Ref Range    CRP Inflammation <2.9 0.0 - 8.0 mg/L   Rheumatoid factor   Result Value Ref Range    Rheumatoid Factor <20 <20 IU/mL   Cyclic Citrullinated Peptide Antibody IgG   Result Value Ref Range    Cyclic Citrullinated Peptide Antibody, IgG 1 <7 U/mL   HLA-B27 Typing   Result Value Ref Range    C21Gujs Method SSOP     B locus B27 Neg    Vitamin D Deficiency   Result Value Ref Range    Vitamin D Deficiency screening 28 20 - 75 ug/L   CBC with platelets   Result Value Ref Range    WBC 5.7 4.0 - 11.0 10e9/L    RBC Count 4.21 3.8 - 5.2 10e12/L    Hemoglobin 13.2 11.7 - 15.7 g/dL    Hematocrit 40.4 35.0 - 47.0 %    MCV 96 78 - 100 fl    MCH 31.4 26.5 - 33.0 pg    MCHC 32.7 31.5 - 36.5 g/dL    RDW 12.7 10.0 - 15.0 %    Platelet Count 253 150 - 450 10e9/L   AST   Result Value Ref Range    AST 17 0 - 45 U/L   ALT   Result Value Ref Range    ALT 27 0 - 50 U/L   Creatinine   Result Value Ref Range    Creatinine 0.67 0.52 - 1.04 mg/dL    GFR Estimate >90 >60 mL/min/[1.73_m2]    GFR Estimate If Black >90 >60 mL/min/[1.73_m2]   Anti Nuclear Haley IgG by IFA with Reflex   Result Value Ref Range    YULY interpretation Negative NEG^Negative   Glucose 6 phosphate dehydrogenase   Result Value Ref Range    Glucose-6-PO4 Dehydrogenase 11.3 9.9 - 16.6 U/g Hb       Recent Labs   Lab Test 01/31/17  1416 10/27/16  1013 07/05/16  1411   WBC 5.5 5.9 6.2   RBC 4.28 4.40 4.32   HGB 13.4 14.0 13.8   HCT 40.4 41.0 41.2   MCV 94 93 95   RDW 12.9 12.8 12.4    316 264   ALBUMIN 4.1 4.0 4.1   BUN 14 13 11      Recent Labs   Lab  Test 08/21/18  1736 01/31/17  1416 10/27/16  1013 09/27/16  1759  02/06/14  1308   TSH 0.52 0.79 0.80 1.29   < > 0.94   T4 1.03  --   --  0.95  --  0.83    < > = values in this interval not displayed.     Hemoglobin   Date Value Ref Range Status   04/04/2019 13.2 11.7 - 15.7 g/dL Final   01/31/2017 13.4 11.7 - 15.7 g/dL Final   10/27/2016 14.0 11.7 - 15.7 g/dL Final     Urea Nitrogen   Date Value Ref Range Status   01/31/2017 14 7 - 30 mg/dL Final   10/27/2016 13 7 - 30 mg/dL Final   07/05/2016 11 7 - 30 mg/dL Final     Sed Rate   Date Value Ref Range Status   04/04/2019 11 0 - 30 mm/h Final   07/05/2016 10 0 - 20 mm/h Final   04/09/2013 8 0 - 20 mm/h Final     CRP Inflammation   Date Value Ref Range Status   04/04/2019 <2.9 0.0 - 8.0 mg/L Final     AST   Date Value Ref Range Status   04/04/2019 17 0 - 45 U/L Final   01/31/2017 10 0 - 45 U/L Final   10/27/2016 15 0 - 45 U/L Final     Albumin   Date Value Ref Range Status   01/31/2017 4.1 3.4 - 5.0 g/dL Final   10/27/2016 4.0 3.4 - 5.0 g/dL Final   07/05/2016 4.1 3.4 - 5.0 g/dL Final     Alkaline Phosphatase   Date Value Ref Range Status   01/31/2017 73 40 - 150 U/L Final   10/27/2016 87 40 - 150 U/L Final   07/05/2016 72 40 - 150 U/L Final     ALT   Date Value Ref Range Status   04/04/2019 27 0 - 50 U/L Final   01/31/2017 20 0 - 50 U/L Final   10/27/2016 25 0 - 50 U/L Final     Rheumatoid Factor   Date Value Ref Range Status   04/04/2019 <20 <20 IU/mL Final     Recent Labs   Lab Test 04/04/19  0922 08/21/18  1736 01/31/17  1416 10/27/16  1013  07/05/16  1411   WBC 5.7  --  5.5 5.9  --  6.2   HGB 13.2  --  13.4 14.0  --  13.8   HCT 40.4  --  40.4 41.0  --  41.2   MCV 96  --  94 93  --  95     --  306 316  --  264   BUN  --   --  14 13  --  11   TSH  --  0.52 0.79 0.80   < > 0.86   AST 17  --  10 15  --  17   ALT 27  --  20 25  --  25   ALKPHOS  --   --  73 87  --  72    < > = values in this interval not displayed.       Reviewed Rheumatology lab  flowsheet    Macario An MD  [facility]  Department of Rheumatology & Autoimmune Disorders  [place]: [phone]   Pager - [phone]

## 2019-04-17 NOTE — TELEPHONE ENCOUNTER
Faxed [facility] referral form and 4/17 office visit notes to [facility]. Called and updated Carolyn.

## 2019-04-17 NOTE — NURSING NOTE
"Carolyn Briones's goals for this visit include:   She requests these members of her care team be copied on today's visit information:     PCP: Gentry Baer    Referring Provider:  No referring provider defined for this encounter.    /70   Pulse 61   Ht 1.619 m (5' 3.75\")   Wt 66.2 kg (146 lb)   SpO2 93%   BMI 25.26 kg/m      "

## 2019-04-17 NOTE — PATIENT INSTRUCTIONS
-- She has no inflammation over her joints on exam, even Ankles and small joints of feet are non tender,     -- Blood tests are normal     -- I will not start her on prednisone . She wants to be seen at [facility] in Orthopedics and will try to send in the referral.     -- RTC on as needed basis.

## 2019-04-23 NOTE — TELEPHONE ENCOUNTER
Called [facility] [phone] to confirm access via PACS to patient's MRI imaging. Updated Carolyn that [facility] now has access. She expressed appreciation for the call.

## 2019-04-23 NOTE — TELEPHONE ENCOUNTER
Patient called and wants Sherri to call her back because she states there has been a little change and would like Sherri to call her on Wed. ASAP. Laure Bermudez CMA

## 2019-04-24 ENCOUNTER — MEDICAL CORRESPONDENCE (OUTPATIENT)
Dept: HEALTH INFORMATION MANAGEMENT | Facility: CLINIC | Age: 54
End: 2019-04-24

## 2019-04-24 DIAGNOSIS — M79.671 BILATERAL FOOT PAIN: Primary | ICD-10-CM

## 2019-04-24 DIAGNOSIS — M02.30 ARTHRITIS, REACTIVE (H): ICD-10-CM

## 2019-04-24 DIAGNOSIS — M79.672 BILATERAL FOOT PAIN: Primary | ICD-10-CM

## 2019-04-24 NOTE — TELEPHONE ENCOUNTER
Faxed new referral to [facility]/Rheumatology, confirmed by [employer]. Patient updated via phone call, expressed appreciation.

## 2019-04-25 ENCOUNTER — THERAPY VISIT (OUTPATIENT)
Dept: PHYSICAL THERAPY | Facility: CLINIC | Age: 54
End: 2019-04-25
Payer: COMMERCIAL

## 2019-04-25 DIAGNOSIS — M79.671 BILATERAL FOOT PAIN: ICD-10-CM

## 2019-04-25 DIAGNOSIS — M79.672 BILATERAL FOOT PAIN: ICD-10-CM

## 2019-04-25 PROCEDURE — 97161 PT EVAL LOW COMPLEX 20 MIN: CPT | Mod: GP | Performed by: PHYSICAL THERAPIST

## 2019-04-25 PROCEDURE — 97110 THERAPEUTIC EXERCISES: CPT | Mod: GP | Performed by: PHYSICAL THERAPIST

## 2019-04-25 NOTE — PROGRESS NOTES
[facility] Initial Evaluation  Subjective:  The history is provided by the patient. No  was used.   Carolyn Briones is a 53 year old female with a bilateral feet condition.  Condition occurred with:  Insidious onset.  Condition occurred: for unknown reasons.  This is a chronic condition  Patient reports a history of B foot pain over the past 2-3 years.  Experienced an exacerbation of B foot pain since late 2018 for no apparent reason.  Had a MRI and a consult with rheumatology.  Reports rheumatology found no arthritis.  Patient also reports continued difficulty with R leg pain.  Has tried PT for this without relief.  Received PT orders for B foot pain 3/1/2019..    Patient reports pain:  Other (heels, across top of feet).    Pain is described as other (muscle pain) and is intermittent and reported as 4/10.   Pain is worse in the A.M..  Symptoms are exacerbated by activity, walking and other (movements) and relieved by rest.    Special tests:  MRI.  Previous treatment includes physical therapy.    General health as reported by patient is good.  Pertinent medical history includes:  High blood pressure and thyroid problems.  Medical allergies: yes (Adhesive, Cypro, Sulfa drugs).  Other surgeries include:  None reported.  Current medications:  High blood pressure medication and other (anxiety medication).  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  Patient is working in normal job without restrictions.  Primary job tasks include:  Prolonged sitting.    Barriers include:  None as reported by patient.    Red flags:  None as reported by patient.                        Objective:  Standing Alignment:        Lumbar:  Lateral shift L        Ankle/Foot:  Normal              Ankle/Foot Evaluation  ROM:    AROM:    Dorsiflexion:  Left:   11  Right:   11  Plantarflexion:  Left:  75    Right:  75  Inversion:  Left:  Equal B     Right:  Equal B  Eversion:  Equal B     Right:  Equal B  Great toe  flexion:  Left:  Equal B     Right:  Equal B  Great Toe Extension:  Left:  Equal B     Right: Equal B    Strength is normal.      PALPATION: normal                                                          General     ROS    Assessment/Plan:    Patient is a 53 year old female with B feet complaints.    Patient has the following significant findings with corresponding treatment plan.                Diagnosis 1:  B foot pain  Pain -  manual therapy, home program  Decreased function - therapeutic activities and home program    Therapy Evaluation Codes:   1) History comprised of:   Personal factors that impact the plan of care:      Time since onset of symptoms.    Comorbidity factors that impact the plan of care are:      None.     Medications impacting care: None.  2) Examination of Body Systems comprised of:   Body structures and functions that impact the plan of care:      B feet.   Activity limitations that impact the plan of care are:      Walking.  3) Clinical presentation characteristics are:   Stable/Uncomplicated.  4) Decision-Making    Low complexity using standardized patient assessment instrument and/or measureable assessment of functional outcome.  Cumulative Therapy Evaluation is: Low complexity.    Previous and current functional limitations:  (See Goal Flow Sheet for this information)    Short term and Long term goals: (See Goal Flow Sheet for this information)     Communication ability:  Patient appears to be able to clearly communicate and understand verbal and written communication and follow directions correctly.  Treatment Explanation - The following has been discussed with the patient:   RX ordered/plan of care  Anticipated outcomes  Possible risks and side effects  This patient would benefit from PT intervention to resume normal activities.   Rehab potential is good.    Frequency:  1 X week, once daily  Duration:  for 8 weeks  Discharge Plan:  Achieve all LTG.  Independent in home treatment  program.  Reach maximal therapeutic benefit.    Please refer to the daily flowsheet for treatment today, total treatment time and time spent performing 1:1 timed codes.

## 2019-04-26 ENCOUNTER — TELEPHONE (OUTPATIENT)
Dept: RHEUMATOLOGY | Facility: CLINIC | Age: 54
End: 2019-04-26

## 2019-04-26 NOTE — TELEPHONE ENCOUNTER
Discussed with Dr. An. Will pursue referral to podiatry at [facility] via ortho. Left message for Carolyn with that update.

## 2019-04-26 NOTE — TELEPHONE ENCOUNTER
Fax received in clinic today from [facility] requesting phone call to give more information.   Called Carolyn, who reports that [facility] is looking for a rule-out rheumatologic disease in the referral. Chart reviewed. Call to [facility] referrals [phone]. They require a very specific rheumatoid diagnosis. Routing to MD and rheum pool for followup.

## 2019-04-26 NOTE — TELEPHONE ENCOUNTER
[facility] Call Center    Phone Message    May a detailed message be left on voicemail: yes    Reason for Call: Other: Pt is requesting for a call back from Sherri. Pt wouldn't give any other info. Pt states to please have Sherri call [facility].      Action Taken: Message routed to:  Adult Clinics: Rheumatology p [phone]

## 2019-04-26 NOTE — TELEPHONE ENCOUNTER
Received call back from Ortho at [facility]. They tell me that since Ortho already denied a referral (the initial referral), they will not reconsider for the same issue. [facility] suggests pursue referrals to another organization.

## 2019-04-29 NOTE — TELEPHONE ENCOUNTER
Patient is following up on this referral that was sent to the [facility].  She states she spoke with the [facility] and they told her the referral that should have been sent initially should have been to their Rheumatology department instead of Orthopedics.  The referral should state a possible diagnosis.  Patient did seem upset because the referrals that have been sent are incorrect.  She would like to speak with a manager about this to get it straightened out.  Please advise.

## 2019-05-01 NOTE — TELEPHONE ENCOUNTER
Patient is returning a call to Sonja about the referral that was supposed to be sent to the [facility].  She states that the [facility] has not received the referral and that she has more information to add.  She is requesting that she receive a call back today to discuss this.  Please advise.

## 2019-05-01 NOTE — PROGRESS NOTES
"Called patient and spoke to patient.    Patient was upset because she received a denial letter from [facility] Ortho department and was informed that she cannot be seen there.  Patient states that she needs to see a specialist in Ortho at [facility] who is no longer accepting external referrals. The only way to see that provider is to get referred to Rheum department at [facility] first and from there on she can be referred to the Ortho specialist.    Writer informed patient that Rheum referral has already been initiated and it just needs to be signed off by Dr. An. Once it's done, it will be sent to the Rheum department in [facility]. Patient states that it's extremely important to indicate the diagnosis for this referral and to mention it as \"querying arthritis?\" as she still doesn't have a confirmed diagnosis. Writer informed that she will convey this to Dr. An and will get her to sign the referral today.     Patient appreciative of help. Writer informed that she will call her back once Dr. An has signed the referral.    "

## 2019-05-02 ENCOUNTER — THERAPY VISIT (OUTPATIENT)
Dept: PHYSICAL THERAPY | Facility: CLINIC | Age: 54
End: 2019-05-02
Payer: COMMERCIAL

## 2019-05-02 ENCOUNTER — TELEPHONE (OUTPATIENT)
Dept: RHEUMATOLOGY | Facility: CLINIC | Age: 54
End: 2019-05-02

## 2019-05-02 DIAGNOSIS — M79.672 BILATERAL FOOT PAIN: Primary | ICD-10-CM

## 2019-05-02 DIAGNOSIS — M79.671 BILATERAL FOOT PAIN: Primary | ICD-10-CM

## 2019-05-02 PROCEDURE — 97140 MANUAL THERAPY 1/> REGIONS: CPT | Mod: GP | Performed by: PHYSICAL THERAPIST

## 2019-05-02 PROCEDURE — 97110 THERAPEUTIC EXERCISES: CPT | Mod: GP | Performed by: PHYSICAL THERAPIST

## 2019-05-02 NOTE — TELEPHONE ENCOUNTER
Spoke with patient. Patient stated she spoke with [facility] yesterday and they still had not received the referral. She is requesting someone call the provider line to place the referral directly with [facility]. Will follow up with referral team to see status of existing referral.     Tila Amaro RN   Pulmonary/Rheumatology Care Coordinator  [facility]

## 2019-05-02 NOTE — TELEPHONE ENCOUNTER
Writer spoke with Adriano, Referral Department at [facility]. Faxed Rheum referral to [phone], with the request to call the patient today to schedule an appointment.     Referral department clinic number is [phone]. Did explain that a this referral was also faxed on 4/24/19 by Sherri however, the referral was not received.    Sonja will ask Ali to contact the patient.    Azalea FUENTES [facility]  Adult Med Spec/Surg Spec   [phone]

## 2019-05-02 NOTE — TELEPHONE ENCOUNTER
Referral faxed to [facility], [facility] will call patient with an appointment.     Informed patient, she denies any other questions or concerns at this time.     Tila Amaro RN   Pulmonary/Rheumatology Care Coordinator  [facility]

## 2019-05-15 ENCOUNTER — TRANSFERRED RECORDS (OUTPATIENT)
Dept: HEALTH INFORMATION MANAGEMENT | Facility: CLINIC | Age: 54
End: 2019-05-15

## 2019-05-16 ENCOUNTER — THERAPY VISIT (OUTPATIENT)
Dept: PHYSICAL THERAPY | Facility: CLINIC | Age: 54
End: 2019-05-16
Payer: COMMERCIAL

## 2019-05-16 DIAGNOSIS — M79.672 BILATERAL FOOT PAIN: ICD-10-CM

## 2019-05-16 DIAGNOSIS — M79.671 BILATERAL FOOT PAIN: ICD-10-CM

## 2019-05-16 PROCEDURE — 97110 THERAPEUTIC EXERCISES: CPT | Mod: GP | Performed by: PHYSICAL THERAPIST

## 2019-05-16 PROCEDURE — 97140 MANUAL THERAPY 1/> REGIONS: CPT | Mod: GP | Performed by: PHYSICAL THERAPIST

## 2019-06-25 ENCOUNTER — ANCILLARY PROCEDURE (OUTPATIENT)
Dept: MAMMOGRAPHY | Facility: CLINIC | Age: 54
End: 2019-06-25
Attending: INTERNAL MEDICINE
Payer: COMMERCIAL

## 2019-06-25 DIAGNOSIS — Z12.39 BREAST SCREENING, UNSPECIFIED: ICD-10-CM

## 2019-06-25 PROCEDURE — 77063 BREAST TOMOSYNTHESIS BI: CPT | Performed by: STUDENT IN AN ORGANIZED HEALTH CARE EDUCATION/TRAINING PROGRAM

## 2019-06-25 PROCEDURE — 77067 SCR MAMMO BI INCL CAD: CPT | Performed by: STUDENT IN AN ORGANIZED HEALTH CARE EDUCATION/TRAINING PROGRAM

## 2019-06-27 PROBLEM — M79.671 BILATERAL FOOT PAIN: Status: RESOLVED | Noted: 2019-04-25 | Resolved: 2019-06-27

## 2019-06-27 PROBLEM — M79.672 BILATERAL FOOT PAIN: Status: RESOLVED | Noted: 2019-04-25 | Resolved: 2019-06-27

## 2019-06-27 NOTE — PROGRESS NOTES
Subjective:  HPI                    Objective:  System    Physical Exam    General     ROS    Assessment/Plan:    DISCHARGE REPORT    Progress reporting period is from 4/25/2019 to 5/16/2019.  Patient was seen for 3 visits.     SUBJECTIVE  Subjective: Patient reports seeing a foot specialist at [facility].  Was given metarsal pads/support to try.  Patient just started using.  Was also given other suggestions for inserts.  Discussed PT and was told it will not hurt, but it might not help.  Is also going to see someone their for LB symptoms.  Currently no pain at rest.  Reports 1.5/10 pain with walking.  Thinks new supports might be providing some releif.  Reports manual therapy felt good last visit and for several hours after.     Current Pain level: 1/10   Initial Pain level: 4/10   Changes in function: Yes, see goal flow sheet for change in function   Adverse reactions: None;   ,     The subjective and objective information are from the last SOAP note on this patient.    OBJECTIVE  Objective: Able to add resistance to exercises without increased pain.      ASSESSMENT/PLAN  Updated problem list and treatment plan: Diagnosis 1:  Foot pain  Pain -  home program  Decreased function - home program  STG/LTGs have been met or progress has been made towards goals:  None  Assessment of Progress: Patient has not returned to therapy.  Current status is unknown.  Self Management Plans:  Patient has been instructed in a home treatment program.      We will discharge this patient from PT.    Recommendations:  Discharge with home program.    Please refer to the daily flowsheet for treatment today, total treatment time and time spent performing 1:1 timed codes.

## 2019-09-09 ENCOUNTER — ALLIED HEALTH/NURSE VISIT (OUTPATIENT)
Dept: PEDIATRICS | Facility: CLINIC | Age: 54
End: 2019-09-09
Payer: COMMERCIAL

## 2019-09-09 VITALS — HEART RATE: 66 BPM | SYSTOLIC BLOOD PRESSURE: 128 MMHG | DIASTOLIC BLOOD PRESSURE: 85 MMHG

## 2019-09-09 DIAGNOSIS — Z01.30 BLOOD PRESSURE CHECK: Primary | ICD-10-CM

## 2019-09-09 PROCEDURE — 99207 ZZC NO CHARGE NURSE ONLY: CPT | Performed by: INTERNAL MEDICINE

## 2019-09-10 NOTE — PROGRESS NOTES
Carolyn Briones was evaluated at [facility] on September 9, 2019 at which time her blood pressure was:    BP Readings from Last 3 Encounters:   09/09/19 128/85   04/17/19 104/70   04/04/19 93/67     Pulse Readings from Last 3 Encounters:   09/09/19 66   04/17/19 61   04/04/19 59       Reviewed lifestyle modifications for blood pressure control and reduction: including making healthy food choices, managing weight, getting regular exercise, smoking cessation, reducing alcohol consumption, monitoring blood pressure regularly.     Symptoms: Other: nause    BP Goal:< 140/90 mmHg    BP Assessment:  BP at goal    Potential Reasons for BP too high: NA - Not applicable    BP Follow-Up Plan: Recheck BP in 6 months at pharmacy    Recommendation to Provider: none    Note completed by: Scott Lomeli Pharm. D.  [facility] Pharmacy Manager  [phone]  9/9/2019      Pt felt nauseated and not right. Explained that nausea could be a symptom of heart attack, but she didn't have any other symptoms. Advised to consult her dr if she isn't feeling well.  Blood pressure was normal after sitting a few more minutes. Advised to get labwork, could be getting sick- that would help differentiate. adivsed dr visit.

## 2019-11-05 ENCOUNTER — HEALTH MAINTENANCE LETTER (OUTPATIENT)
Age: 54
End: 2019-11-05

## 2020-02-16 ENCOUNTER — HEALTH MAINTENANCE LETTER (OUTPATIENT)
Age: 55
End: 2020-02-16

## 2020-05-07 ENCOUNTER — OFFICE VISIT (OUTPATIENT)
Dept: NEUROSURGERY | Facility: CLINIC | Age: 55
End: 2020-05-07
Attending: NURSE PRACTITIONER
Payer: COMMERCIAL

## 2020-05-07 VITALS
BODY MASS INDEX: 25.1 KG/M2 | HEART RATE: 74 BPM | SYSTOLIC BLOOD PRESSURE: 112 MMHG | WEIGHT: 147 LBS | OXYGEN SATURATION: 99 % | DIASTOLIC BLOOD PRESSURE: 76 MMHG | HEIGHT: 64 IN

## 2020-05-07 DIAGNOSIS — M54.16 LUMBAR RADICULOPATHY: Primary | ICD-10-CM

## 2020-05-07 PROCEDURE — 99204 OFFICE O/P NEW MOD 45 MIN: CPT | Performed by: NURSE PRACTITIONER

## 2020-05-07 PROCEDURE — G0463 HOSPITAL OUTPT CLINIC VISIT: HCPCS

## 2020-05-07 ASSESSMENT — MIFFLIN-ST. JEOR: SCORE: 1252.82

## 2020-05-07 ASSESSMENT — PAIN SCALES - GENERAL: PAINLEVEL: SEVERE PAIN (6)

## 2020-05-07 NOTE — LETTER
5/7/2020         RE: Carolyn Briones  [address]  [place] [address]        Dear Colleague,    Thank you for referring your patient, Carolyn Briones, to the [facility] NEUROSURGERY [facility]INIC. Please see a copy of my visit note below.    [facility] Neurosurgery  Neurosurgery Clinic Visit      CC: back and leg pain    Primary care Provider: Gentry Baer      Reason For Visit:   Self-referral.      HPI: Carolyn Briones is a 54 year old female with a 2 year history of low back pain with a 10 day history of worsening of symptoms. She states the pain originated after seeing a chiropractor. She notes intermittent low back pain that radiates into her right buttock, right lateral thigh, and right lateral calf. She reports right foot burning and tingling as well. She denies overt weakness. She denies bowel/bladder complaints and foot drop. She states pain is worse with right-tsai motion and prolonged sitting. She states a hot shower improves symptoms. She has undergone PT and pressure point injections in the past without much benefit. She has not had any recent imaging.     Past Medical History:   Diagnosis Date     Anemia      Empty sella (H)     only partially empty; possibly normal vaiant     Exposure to other ionizing radiation, subsequent encounter 1986    [place]     GERD (gastroesophageal reflux disease)      Hypercholesterolemia      Hypertension      Iron deficiency      Multiple thyroid nodules     subcm       Past Medical History reviewed with patient during visit.    Past Surgical History:   Procedure Laterality Date     APPENDECTOMY       COLONOSCOPY       DILATION AND CURETTAGE SUCTION  11/13/2012    Procedure: DILATION AND CURETTAGE SUCTION;  Suction Dilation & Curettage    (8 weeks);  Surgeon: Sherri Christiansen MD;  [facility]     EXCISE GANGLION WRIST  4/10/2014    Procedure: EXCISE GANGLION WRIST;  Excision Ganglion Cyst, Left Wrist;  Surgeon: Ashley Garcia MD;  [facility]      OPERATIVE HYSTEROSCOPY WITH MORCELLATOR  1/19/2012    Procedure:OPERATIVE HYSTEROSCOPY WITH MORCELLATOR; Hysteroscopy & Polypectomy With Morcellator; Surgeon:KEN ROSE; [facility]     Past Surgical History reviewed with patient during visit.    Current Outpatient Medications   Medication     atorvastatin (LIPITOR) 10 MG tablet     escitalopram (LEXAPRO) 5 MG tablet     LORazepam (ATIVAN) 0.5 MG tablet     metoprolol (TOPROL-XL) 25 MG 24 hr tablet     Multiple Vitamin (MULTI-VITAMINS PO)     predniSONE (DELTASONE) 5 MG tablet     PREVACID 30 MG OR CPDR     No current facility-administered medications for this visit.        Allergies   Allergen Reactions     Iron Dextran Shortness Of Breath     Doxycycline Other (See Comments)     High blood pressure     Cipro [Ciprofloxacin] Nausea     Liquid Adhesive Itching     Sulphadimidine [Sulfa Drugs] Nausea     Adhesive Tape Rash       Social History     Socioeconomic History     Marital status:      Spouse name: Not on file     Number of children: Not on file     Years of education: Not on file     Highest education level: Not on file   Occupational History     Not on file   Social Needs     Financial resource strain: Not on file     Food insecurity     Worry: Not on file     Inability: Not on file     Transportation needs     Medical: Not on file     Non-medical: Not on file   Tobacco Use     Smoking status: Never Smoker     Smokeless tobacco: Never Used   Substance and Sexual Activity     Alcohol use: No     Drug use: No     Sexual activity: Yes     Partners: Male     Birth control/protection: Condom   Lifestyle     Physical activity     Days per week: Not on file     Minutes per session: Not on file     Stress: Not on file   Relationships     Social connections     Talks on phone: Not on file     Gets together: Not on file     Attends Church service: Not on file     Active member of club or organization: Not on file     Attends meetings of clubs or  "organizations: Not on file     Relationship status: Not on file     Intimate partner violence     Fear of current or ex partner: Not on file     Emotionally abused: Not on file     Physically abused: Not on file     Forced sexual activity: Not on file   Other Topics Concern     Parent/sibling w/ CABG, MI or angioplasty before 65F 55M? Not Asked   Social History Narrative     Not on file       Family History   Problem Relation Age of Onset     Lipids Mother      Cancer Mother         desmoplastic mesothelioma     C.A.D. Father         CABG age 65     Thyroid Disease No family hx of      Thyroid Cancer No family hx of          ROS: 10 point ROS neg other than the symptoms noted above in the HPI.    Vital Signs:   /76   Pulse 74   Ht 5' 3.75\" (1.619 m)   Wt 147 lb (66.7 kg)   SpO2 99%   BMI 25.43 kg/m        Examination:  Constitutional:  Alert, well nourished, NAD.  Memory: recent and remote memory   HEENT: Normocephalic, atraumatic.   Pulm:  Without shortness of breath   CV:  No pitting edema of BLE.      Neurological:  Awake  Alert  Oriented x 3  Speech clear  Tongue midline    Motor exam:   Hip Flexor:                Right: 5/5  Left:  5/5  Hip Adductor:             Right:  5/5  Left:  5/5  Hip Abductor:             Right:  5/5  Left:  5/5  Gastroc Soleus:        Right:  5/5  Left:  5/5  Tib/Ant:                      Right:  5/5  Left:  5/5  EHL:                          Right:  5/5  Left:  5/5    Sensation normal to bilateral upper and lower extremities  Muscle tone to bilateral upper and lower extremities   Gait: Able to stand from a seated position. Normal non-antalgic, non-myelopathic gait.  Able to heel/toe walk without loss of balance    Lumbar examination reveals no tenderness of the spine or paraspinous muscles.  Hip height is symmetrical. Negative SI joint, sciatic notch or greater trochanteric tenderness to palpation bilaterally.  Straight leg raise is negative bilaterally.      Imaging: Lumbar " MRI 6/16/2018  IMPRESSION: Degenerative changes of the lumbar spine as described above.    Assessment/Plan:    Carolyn Briones is a 54 year old female with a 2 year history of low back pain. She states the pain originated after seeing a chiropractor. She notes intermittent low back pain that radiates into her right buttock, right lateral thigh, and right lateral calf. She reports right foot burning and tingling as well. She denies overt weakness. She denies bowel/bladder complaints and foot drop. Recommended updated lumbar MRI at this time. She verbalized understanding and agreement.    Patient Instructions   -Lumbar MRI ordered. You may either schedule at the desk before you leave, or contact [phone].   -We will contact you when we get the results.  -Please contact the clinic with questions or concerns at [phone].      Tana Rollins CNP  [facility]  [address] [place] [id number]  Tel [phone]  Fax [phone]      Again, thank you for allowing me to participate in the care of your patient.        Sincerely,        Tana Rollins, [facility]

## 2020-05-07 NOTE — NURSING NOTE
"Carolyn Briones is a 54 year old female who presents for:  Chief Complaint   Patient presents with     Neurologic Problem     Chronic LBP w sciatica radiates down right leg        Initial Vitals:  /76   Pulse 74   Ht 5' 3.75\" (1.619 m)   Wt 147 lb (66.7 kg)   SpO2 99%   BMI 25.43 kg/m   Estimated body mass index is 25.43 kg/m  as calculated from the following:    Height as of this encounter: 5' 3.75\" (1.619 m).    Weight as of this encounter: 147 lb (66.7 kg).. Body surface area is 1.73 meters squared. BP completed using cuff size: regular  Severe Pain (6)    Nursing Comments: Patient presents with LBP that radiates down right leg    Sal Hines MA  "

## 2020-05-07 NOTE — PATIENT INSTRUCTIONS
-Lumbar MRI ordered. You may either schedule at the desk before you leave, or contact [phone].   -We will contact you when we get the results.  -Please contact the clinic with questions or concerns at [phone].

## 2020-05-07 NOTE — PROGRESS NOTES
[facility]  Neurosurgery Clinic Visit      CC: back and leg pain    Primary care Provider: Gentry Baer      Reason For Visit:   Self-referral.      HPI: Carolyn Briones is a 54 year old female with a 2 year history of low back pain with a 10 day history of worsening of symptoms. She states the pain originated after seeing a chiropractor. She notes intermittent low back pain that radiates into her right buttock, right lateral thigh, and right lateral calf. She reports right foot burning and tingling as well. She denies overt weakness. She denies bowel/bladder complaints and foot drop. She states pain is worse with right-tsai motion and prolonged sitting. She states a hot shower improves symptoms. She has undergone PT and pressure point injections in the past without much benefit. She has not had any recent imaging.     Past Medical History:   Diagnosis Date     Anemia      Empty sella (H)     only partially empty; possibly normal vaiant     Exposure to other ionizing radiation, subsequent encounter 1986    [place]     GERD (gastroesophageal reflux disease)      Hypercholesterolemia      Hypertension      Iron deficiency      Multiple thyroid nodules     subcm       Past Medical History reviewed with patient during visit.    Past Surgical History:   Procedure Laterality Date     APPENDECTOMY       COLONOSCOPY       DILATION AND CURETTAGE SUCTION  11/13/2012    Procedure: DILATION AND CURETTAGE SUCTION;  Suction Dilation & Curettage    (8 weeks);  Surgeon: Ken Rose MD;  [facility]     EXCISE GANGLION WRIST  4/10/2014    Procedure: EXCISE GANGLION WRIST;  Excision Ganglion Cyst, Left Wrist;  Surgeon: Ashley Garcia MD;  [facility]     OPERATIVE HYSTEROSCOPY WITH MORCELLATOR  1/19/2012    Procedure:OPERATIVE HYSTEROSCOPY WITH MORCELLATOR; Hysteroscopy & Polypectomy With Morcellator; Surgeon:KEN ROSE; [facility]     Past Surgical History reviewed with patient during  visit.    Current Outpatient Medications   Medication     atorvastatin (LIPITOR) 10 MG tablet     escitalopram (LEXAPRO) 5 MG tablet     LORazepam (ATIVAN) 0.5 MG tablet     metoprolol (TOPROL-XL) 25 MG 24 hr tablet     Multiple Vitamin (MULTI-VITAMINS PO)     predniSONE (DELTASONE) 5 MG tablet     PREVACID 30 MG OR CPDR     No current facility-administered medications for this visit.        Allergies   Allergen Reactions     Iron Dextran Shortness Of Breath     Doxycycline Other (See Comments)     High blood pressure     Cipro [Ciprofloxacin] Nausea     Liquid Adhesive Itching     Sulphadimidine [Sulfa Drugs] Nausea     Adhesive Tape Rash       Social History     Socioeconomic History     Marital status:      Spouse name: Not on file     Number of children: Not on file     Years of education: Not on file     Highest education level: Not on file   Occupational History     Not on file   Social Needs     Financial resource strain: Not on file     Food insecurity     Worry: Not on file     Inability: Not on file     Transportation needs     Medical: Not on file     Non-medical: Not on file   Tobacco Use     Smoking status: Never Smoker     Smokeless tobacco: Never Used   Substance and Sexual Activity     Alcohol use: No     Drug use: No     Sexual activity: Yes     Partners: Male     Birth control/protection: Condom   Lifestyle     Physical activity     Days per week: Not on file     Minutes per session: Not on file     Stress: Not on file   Relationships     Social connections     Talks on phone: Not on file     Gets together: Not on file     Attends Sabianism service: Not on file     Active member of club or organization: Not on file     Attends meetings of clubs or organizations: Not on file     Relationship status: Not on file     Intimate partner violence     Fear of current or ex partner: Not on file     Emotionally abused: Not on file     Physically abused: Not on file     Forced sexual activity: Not on  "file   Other Topics Concern     Parent/sibling w/ CABG, MI or angioplasty before 65F 55M? Not Asked   Social History Narrative     Not on file       Family History   Problem Relation Age of Onset     Lipids Mother      Cancer Mother         desmoplastic mesothelioma     C.A.D. Father         CABG age 65     Thyroid Disease No family hx of      Thyroid Cancer No family hx of          ROS: 10 point ROS neg other than the symptoms noted above in the HPI.    Vital Signs:   /76   Pulse 74   Ht 5' 3.75\" (1.619 m)   Wt 147 lb (66.7 kg)   SpO2 99%   BMI 25.43 kg/m        Examination:  Constitutional:  Alert, well nourished, NAD.  Memory: recent and remote memory   HEENT: Normocephalic, atraumatic.   Pulm:  Without shortness of breath   CV:  No pitting edema of BLE.      Neurological:  Awake  Alert  Oriented x 3  Speech clear  Tongue midline    Motor exam:   Hip Flexor:                Right: 5/5  Left:  5/5  Hip Adductor:             Right:  5/5  Left:  5/5  Hip Abductor:             Right:  5/5  Left:  5/5  Gastroc Soleus:        Right:  5/5  Left:  5/5  Tib/Ant:                      Right:  5/5  Left:  5/5  EHL:                          Right:  5/5  Left:  5/5    Sensation normal to bilateral upper and lower extremities  Muscle tone to bilateral upper and lower extremities   Gait: Able to stand from a seated position. Normal non-antalgic, non-myelopathic gait.  Able to heel/toe walk without loss of balance    Lumbar examination reveals no tenderness of the spine or paraspinous muscles.  Hip height is symmetrical. Negative SI joint, sciatic notch or greater trochanteric tenderness to palpation bilaterally.  Straight leg raise is negative bilaterally.      Imaging: Lumbar MRI 6/16/2018  IMPRESSION: Degenerative changes of the lumbar spine as described above.    Assessment/Plan:    Carolyn Briones is a 54 year old female with a 2 year history of low back pain. She states the pain originated after seeing a " chiropractor. She notes intermittent low back pain that radiates into her right buttock, right lateral thigh, and right lateral calf. She reports right foot burning and tingling as well. She denies overt weakness. She denies bowel/bladder complaints and foot drop. Recommended updated lumbar MRI at this time. She verbalized understanding and agreement.    Patient Instructions   -Lumbar MRI ordered. You may either schedule at the desk before you leave, or contact [phone].   -We will contact you when we get the results.  -Please contact the clinic with questions or concerns at [phone].      Tana Rollins, [facility]surgery  [address] [place] [id number]  Tel [phone]  Fax [phone]

## 2020-05-14 ENCOUNTER — ANCILLARY PROCEDURE (OUTPATIENT)
Dept: MRI IMAGING | Facility: CLINIC | Age: 55
End: 2020-05-14
Attending: NURSE PRACTITIONER
Payer: COMMERCIAL

## 2020-05-14 DIAGNOSIS — M54.16 LUMBAR RADICULOPATHY: ICD-10-CM

## 2020-05-14 PROCEDURE — 72148 MRI LUMBAR SPINE W/O DYE: CPT | Mod: TC

## 2020-05-15 ENCOUNTER — TRANSFERRED RECORDS (OUTPATIENT)
Dept: HEALTH INFORMATION MANAGEMENT | Facility: CLINIC | Age: 55
End: 2020-05-15

## 2020-05-15 ENCOUNTER — TELEPHONE (OUTPATIENT)
Dept: NEUROSURGERY | Facility: CLINIC | Age: 55
End: 2020-05-15

## 2020-05-15 NOTE — TELEPHONE ENCOUNTER
Per Maile Sage PA-C: can you let her know the disc protrusion at L4-5 has slightly worsened and likely cause of her symptoms. Recommend right paracentral L4-5 SAMUEL.     Called and spoke to patient. She is hesitant to move forward with injections due to potential side effects. She has requested other recommendations if possible. Informed her I will route message to care team to see if there are any other options for her pain management at this time. Patient also requested MRI report be released in [place]. This has been done.

## 2020-05-15 NOTE — TELEPHONE ENCOUNTER
Per Maile Sage PA-C: Can try tylenol/NSAIDS, heat/ice, etc, but if she does not want to move forward with injection or is not interested in surgery, could send her to pain clinic if she is looking to go more medication/conservative route.     If she does not want to do SAMUEL, but would be open to surgery, could have her follow up with Dr. Bal as well.     Patient requested to speak to Maile Sage PA-C for clarification on recommendations. Patient is frustrated with increased pain. After speaking with Maile, patient would like to come in and see Dr. Bal to discuss surgical options. Appt scheduled for 5/21.

## 2020-05-20 ENCOUNTER — MEDICAL CORRESPONDENCE (OUTPATIENT)
Dept: HEALTH INFORMATION MANAGEMENT | Facility: CLINIC | Age: 55
End: 2020-05-20

## 2020-05-20 ENCOUNTER — TRANSFERRED RECORDS (OUTPATIENT)
Dept: HEALTH INFORMATION MANAGEMENT | Facility: CLINIC | Age: 55
End: 2020-05-20

## 2020-05-21 ENCOUNTER — OFFICE VISIT (OUTPATIENT)
Dept: NEUROSURGERY | Facility: CLINIC | Age: 55
End: 2020-05-21
Attending: NEUROLOGICAL SURGERY
Payer: COMMERCIAL

## 2020-05-21 VITALS
DIASTOLIC BLOOD PRESSURE: 69 MMHG | TEMPERATURE: 97.8 F | WEIGHT: 147 LBS | HEART RATE: 68 BPM | HEIGHT: 64 IN | SYSTOLIC BLOOD PRESSURE: 106 MMHG | BODY MASS INDEX: 25.1 KG/M2 | OXYGEN SATURATION: 97 %

## 2020-05-21 DIAGNOSIS — M54.16 LUMBAR RADICULOPATHY: Primary | ICD-10-CM

## 2020-05-21 PROCEDURE — 99214 OFFICE O/P EST MOD 30 MIN: CPT | Performed by: NEUROLOGICAL SURGERY

## 2020-05-21 PROCEDURE — G0463 HOSPITAL OUTPT CLINIC VISIT: HCPCS

## 2020-05-21 ASSESSMENT — PAIN SCALES - GENERAL: PAINLEVEL: MODERATE PAIN (5)

## 2020-05-21 ASSESSMENT — MIFFLIN-ST. JEOR: SCORE: 1252.82

## 2020-05-21 NOTE — LETTER
5/21/2020         RE: Carolyn Briones  [address]  [place] [address]        Dear Colleague,    Thank you for referring your patient, Carolyn Briones, to the [facility]. Please see a copy of my visit note below.    Carolyn Briones is a 54 year old female with a 2 year history of low back pain with a 10 day history of worsening of symptoms. She states the pain originated after seeing a chiropractor. She notes intermittent low back pain that radiates into her right buttock, right lateral thigh, and right lateral calf. She reports right foot burning and tingling as well. She denies overt weakness. She denies bowel/bladder complaints and foot drop. She states pain is worse with right-tsai motion and prolonged sitting. She states a hot shower improves symptoms. She has undergone PT and pressure point injections in the past without much benefit. She has not had any recent imaging.     Returns for follow up.  Continued symptoms as above.  New MRI shows progressive right L4-5 disc herniation.      Past Medical History:   Diagnosis Date     Anemia      Empty sella (H)     only partially empty; possibly normal vaiant     Exposure to other ionizing radiation, subsequent encounter 1986 [place]     GERD (gastroesophageal reflux disease)      Hypercholesterolemia      Hypertension      Iron deficiency      Multiple thyroid nodules     subcm       Past Medical History reviewed with patient during visit.    Past Surgical History:   Procedure Laterality Date     APPENDECTOMY       COLONOSCOPY       DILATION AND CURETTAGE SUCTION  11/13/2012    Procedure: DILATION AND CURETTAGE SUCTION;  Suction Dilation & Curettage    (8 weeks);  Surgeon: Sherri Christiansen MD;  [facility]     EXCISE GANGLION WRIST  4/10/2014    Procedure: EXCISE GANGLION WRIST;  Excision Ganglion Cyst, Left Wrist;  Surgeon: Ashley Garcia MD;  [facility]     OPERATIVE HYSTEROSCOPY WITH MORCELLATOR  1/19/2012     Procedure:OPERATIVE HYSTEROSCOPY WITH MORCELLATOR; Hysteroscopy & Polypectomy With Morcellator; Surgeon:KEN ROSE; [facility]     Past Surgical History reviewed with patient during visit.    Current Outpatient Medications   Medication     atorvastatin (LIPITOR) 10 MG tablet     escitalopram (LEXAPRO) 5 MG tablet     LORazepam (ATIVAN) 0.5 MG tablet     metoprolol (TOPROL-XL) 25 MG 24 hr tablet     PREVACID 30 MG OR CPDR     Multiple Vitamin (MULTI-VITAMINS PO)     predniSONE (DELTASONE) 5 MG tablet     No current facility-administered medications for this visit.        Allergies   Allergen Reactions     Iron Dextran Shortness Of Breath     Doxycycline Other (See Comments)     High blood pressure     Cipro [Ciprofloxacin] Nausea     Liquid Adhesive Itching     Sulphadimidine [Sulfa Drugs] Nausea     Adhesive Tape Rash       Social History     Socioeconomic History     Marital status:      Spouse name: Not on file     Number of children: Not on file     Years of education: Not on file     Highest education level: Not on file   Occupational History     Not on file   Social Needs     Financial resource strain: Not on file     Food insecurity     Worry: Not on file     Inability: Not on file     Transportation needs     Medical: Not on file     Non-medical: Not on file   Tobacco Use     Smoking status: Never Smoker     Smokeless tobacco: Never Used   Substance and Sexual Activity     Alcohol use: No     Drug use: No     Sexual activity: Yes     Partners: Male     Birth control/protection: Condom   Lifestyle     Physical activity     Days per week: Not on file     Minutes per session: Not on file     Stress: Not on file   Relationships     Social connections     Talks on phone: Not on file     Gets together: Not on file     Attends Anabaptism service: Not on file     Active member of club or organization: Not on file     Attends meetings of clubs or organizations: Not on file     Relationship status: Not  "on file     Intimate partner violence     Fear of current or ex partner: Not on file     Emotionally abused: Not on file     Physically abused: Not on file     Forced sexual activity: Not on file   Other Topics Concern     Parent/sibling w/ CABG, MI or angioplasty before 65F 55M? Not Asked   Social History Narrative     Not on file       Family History   Problem Relation Age of Onset     Lipids Mother      Cancer Mother         desmoplastic mesothelioma     C.A.D. Father         CABG age 65     Thyroid Disease No family hx of      Thyroid Cancer No family hx of          ROS: 10 point ROS neg other than the symptoms noted above in the HPI.    Vital Signs:   /69   Pulse 68   Temp 97.8  F (36.6  C)   Ht 1.619 m (5' 3.75\")   Wt 66.7 kg (147 lb)   SpO2 97%   BMI 25.43 kg/m        Examination:  Constitutional:  Alert, well nourished, NAD.  Memory: recent and remote memory   HEENT: Normocephalic, atraumatic.   Pulm:  Without shortness of breath   CV:  No pitting edema of BLE.      Neurological:  Awake  Alert  Oriented x 3  Speech clear  Tongue midline    Motor exam:   Hip Flexor:                Right: 5/5  Left:  5/5  Hip Adductor:             Right:  5/5  Left:  5/5  Hip Abductor:             Right:  5/5  Left:  5/5  Gastroc Soleus:        Right:  5/5  Left:  5/5  Tib/Ant:                      Right:  5/5  Left:  5/5  EHL:                          Right:  5/5  Left:  5/5    Sensation normal to bilateral upper and lower extremities  Muscle tone to bilateral upper and lower extremities   Gait: Able to stand from a seated position. Normal non-antalgic, non-myelopathic gait.  Able to heel/toe walk without loss of balance    Lumbar examination reveals no tenderness of the spine or paraspinous muscles.  Hip height is symmetrical. Negative SI joint, sciatic notch or greater trochanteric tenderness to palpation bilaterally.  Straight leg raise is negative bilaterally.        Assessment/Plan:    Carolyn Briones is a 54 " year old female with a 2 year history of low back pain. She states the pain originated after seeing a chiropractor. She notes intermittent low back pain that radiates into her right buttock, right lateral thigh, and right lateral calf. She reports right foot burning and tingling as well.     New MRI shows progressive right L4-5 disc herniation  Discussed options, including MIS microdiscectomy, vs SAMUEL and another course of therapy  She will consider options and call us back        Again, thank you for allowing me to participate in the care of your patient.        Sincerely,        Simon Bal MD

## 2020-05-21 NOTE — NURSING NOTE
"Carolyn Briones is a 54 year old female who presents for:  Chief Complaint   Patient presents with     Neurologic Problem     LBP here to discuss options with Dr. Bal        Initial Vitals:  /69   Pulse 68   Temp 97.8  F (36.6  C)   Ht 5' 3.75\" (1.619 m)   Wt 147 lb (66.7 kg)   SpO2 97%   BMI 25.43 kg/m   Estimated body mass index is 25.43 kg/m  as calculated from the following:    Height as of this encounter: 5' 3.75\" (1.619 m).    Weight as of this encounter: 147 lb (66.7 kg).. Body surface area is 1.73 meters squared. BP completed using cuff size: regular  Moderate Pain (5)    Nursing Comments: Patient presents with LBP radiates right down to  foot    Sal Hines MA  "

## 2020-05-21 NOTE — PROGRESS NOTES
Carolyn Briones is a 54 year old female with a 2 year history of low back pain with a 10 day history of worsening of symptoms. She states the pain originated after seeing a chiropractor. She notes intermittent low back pain that radiates into her right buttock, right lateral thigh, and right lateral calf. She reports right foot burning and tingling as well. She denies overt weakness. She denies bowel/bladder complaints and foot drop. She states pain is worse with right-tsai motion and prolonged sitting. She states a hot shower improves symptoms. She has undergone PT and pressure point injections in the past without much benefit. She has not had any recent imaging.     Returns for follow up.  Continued symptoms as above.  New MRI shows progressive right L4-5 disc herniation.      Past Medical History:   Diagnosis Date     Anemia      Empty sella (H)     only partially empty; possibly normal vaiant     Exposure to other ionizing radiation, subsequent encounter 1986 [place]     GERD (gastroesophageal reflux disease)      Hypercholesterolemia      Hypertension      Iron deficiency      Multiple thyroid nodules     subcm       Past Medical History reviewed with patient during visit.    Past Surgical History:   Procedure Laterality Date     APPENDECTOMY       COLONOSCOPY       DILATION AND CURETTAGE SUCTION  11/13/2012    Procedure: DILATION AND CURETTAGE SUCTION;  Suction Dilation & Curettage    (8 weeks);  Surgeon: Ken Rose MD;  [facility]     EXCISE GANGLION WRIST  4/10/2014    Procedure: EXCISE GANGLION WRIST;  Excision Ganglion Cyst, Left Wrist;  Surgeon: Ashley Garcia MD;  [facility]     OPERATIVE HYSTEROSCOPY WITH MORCELLATOR  1/19/2012    Procedure:OPERATIVE HYSTEROSCOPY WITH MORCELLATOR; Hysteroscopy & Polypectomy With Morcellator; Surgeon:KEN ROSE; [facility]     Past Surgical History reviewed with patient during visit.    Current Outpatient Medications   Medication      atorvastatin (LIPITOR) 10 MG tablet     escitalopram (LEXAPRO) 5 MG tablet     LORazepam (ATIVAN) 0.5 MG tablet     metoprolol (TOPROL-XL) 25 MG 24 hr tablet     PREVACID 30 MG OR CPDR     Multiple Vitamin (MULTI-VITAMINS PO)     predniSONE (DELTASONE) 5 MG tablet     No current facility-administered medications for this visit.        Allergies   Allergen Reactions     Iron Dextran Shortness Of Breath     Doxycycline Other (See Comments)     High blood pressure     Cipro [Ciprofloxacin] Nausea     Liquid Adhesive Itching     Sulphadimidine [Sulfa Drugs] Nausea     Adhesive Tape Rash       Social History     Socioeconomic History     Marital status:      Spouse name: Not on file     Number of children: Not on file     Years of education: Not on file     Highest education level: Not on file   Occupational History     Not on file   Social Needs     Financial resource strain: Not on file     Food insecurity     Worry: Not on file     Inability: Not on file     Transportation needs     Medical: Not on file     Non-medical: Not on file   Tobacco Use     Smoking status: Never Smoker     Smokeless tobacco: Never Used   Substance and Sexual Activity     Alcohol use: No     Drug use: No     Sexual activity: Yes     Partners: Male     Birth control/protection: Condom   Lifestyle     Physical activity     Days per week: Not on file     Minutes per session: Not on file     Stress: Not on file   Relationships     Social connections     Talks on phone: Not on file     Gets together: Not on file     Attends Restorationist service: Not on file     Active member of club or organization: Not on file     Attends meetings of clubs or organizations: Not on file     Relationship status: Not on file     Intimate partner violence     Fear of current or ex partner: Not on file     Emotionally abused: Not on file     Physically abused: Not on file     Forced sexual activity: Not on file   Other Topics Concern     Parent/sibling w/ CABG, MI  "or angioplasty before 65F 55M? Not Asked   Social History Narrative     Not on file       Family History   Problem Relation Age of Onset     Lipids Mother      Cancer Mother         desmoplastic mesothelioma     C.A.D. Father         CABG age 65     Thyroid Disease No family hx of      Thyroid Cancer No family hx of          ROS: 10 point ROS neg other than the symptoms noted above in the HPI.    Vital Signs:   /69   Pulse 68   Temp 97.8  F (36.6  C)   Ht 1.619 m (5' 3.75\")   Wt 66.7 kg (147 lb)   SpO2 97%   BMI 25.43 kg/m        Examination:  Constitutional:  Alert, well nourished, NAD.  Memory: recent and remote memory   HEENT: Normocephalic, atraumatic.   Pulm:  Without shortness of breath   CV:  No pitting edema of BLE.      Neurological:  Awake  Alert  Oriented x 3  Speech clear  Tongue midline    Motor exam:   Hip Flexor:                Right: 5/5  Left:  5/5  Hip Adductor:             Right:  5/5  Left:  5/5  Hip Abductor:             Right:  5/5  Left:  5/5  Gastroc Soleus:        Right:  5/5  Left:  5/5  Tib/Ant:                      Right:  5/5  Left:  5/5  EHL:                          Right:  5/5  Left:  5/5    Sensation normal to bilateral upper and lower extremities  Muscle tone to bilateral upper and lower extremities   Gait: Able to stand from a seated position. Normal non-antalgic, non-myelopathic gait.  Able to heel/toe walk without loss of balance    Lumbar examination reveals no tenderness of the spine or paraspinous muscles.  Hip height is symmetrical. Negative SI joint, sciatic notch or greater trochanteric tenderness to palpation bilaterally.  Straight leg raise is negative bilaterally.        Assessment/Plan:    Carolyn Briones is a 54 year old female with a 2 year history of low back pain. She states the pain originated after seeing a chiropractor. She notes intermittent low back pain that radiates into her right buttock, right lateral thigh, and right lateral calf. She reports " right foot burning and tingling as well.     New MRI shows progressive right L4-5 disc herniation  Discussed options, including MIS microdiscectomy, vs SAMUEL and another course of therapy  She will consider options and call us back

## 2020-07-01 ENCOUNTER — ANCILLARY PROCEDURE (OUTPATIENT)
Dept: ULTRASOUND IMAGING | Facility: CLINIC | Age: 55
End: 2020-07-01
Attending: OBSTETRICS & GYNECOLOGY
Payer: COMMERCIAL

## 2020-07-01 ENCOUNTER — ANCILLARY PROCEDURE (OUTPATIENT)
Dept: MAMMOGRAPHY | Facility: CLINIC | Age: 55
End: 2020-07-01
Attending: OBSTETRICS & GYNECOLOGY
Payer: COMMERCIAL

## 2020-07-01 DIAGNOSIS — N64.4 MASTODYNIA OF LEFT BREAST: ICD-10-CM

## 2020-07-01 PROCEDURE — G0279 TOMOSYNTHESIS, MAMMO: HCPCS | Performed by: RADIOLOGY

## 2020-07-01 PROCEDURE — 77066 DX MAMMO INCL CAD BI: CPT | Performed by: RADIOLOGY

## 2020-07-01 PROCEDURE — 76642 ULTRASOUND BREAST LIMITED: CPT | Mod: LT | Performed by: RADIOLOGY

## 2020-08-07 ENCOUNTER — TELEPHONE (OUTPATIENT)
Dept: PODIATRY | Facility: CLINIC | Age: 55
End: 2020-08-07

## 2020-08-07 NOTE — TELEPHONE ENCOUNTER
LOV 3/15/19 for bilateral foot pain functional hallux limitus and bunion present, capsulitis MPJs bilaterally, peroneal tendinopathy bilaterally.  Will route to provider to see about letter.  Ashley Hinkle RN

## 2020-08-07 NOTE — LETTER
August 10, 2020      RE: Carolyn Briones  [address]   [place] [address]       To whom it may concern:    Carolyn Briones is being treated for bilateral foot pain, functional hallux limitus and bunion present, capsulitis MPJs bilaterally, peroneal tendinopathy bilaterally.  Custom foot orthotics are needed to treat her foot conditions.    Sincerely,      Maximiliano Willett DPM

## 2020-08-07 NOTE — TELEPHONE ENCOUNTER
[facility] Call Center    Phone Message    May a detailed message be left on voicemail: yes     Reason for Call: Patient called wanting to know if Dr. Willett is able to write a letter of necessity so she is able to purchase some foot support for her planters fascitis on her [employer] account. Please advise. Thank you.    Action Taken: Message routed to:  Adult Clinics: Podiatry p [phone]    Travel Screening: Not Applicable

## 2020-08-10 NOTE — TELEPHONE ENCOUNTER
Letter has been drafted.  Patient requests a copy of her letter be emailed to her.  This has been done as requested.

## 2020-09-01 ENCOUNTER — OFFICE VISIT (OUTPATIENT)
Dept: URGENT CARE | Facility: URGENT CARE | Age: 55
End: 2020-09-01
Payer: COMMERCIAL

## 2020-09-01 VITALS
DIASTOLIC BLOOD PRESSURE: 83 MMHG | TEMPERATURE: 98 F | BODY MASS INDEX: 24.91 KG/M2 | SYSTOLIC BLOOD PRESSURE: 130 MMHG | RESPIRATION RATE: 12 BRPM | OXYGEN SATURATION: 97 % | WEIGHT: 144 LBS | HEART RATE: 75 BPM

## 2020-09-01 DIAGNOSIS — M79.602 LEFT ARM PAIN: Primary | ICD-10-CM

## 2020-09-01 PROCEDURE — 99204 OFFICE O/P NEW MOD 45 MIN: CPT | Performed by: NURSE PRACTITIONER

## 2020-09-01 PROCEDURE — 93000 ELECTROCARDIOGRAM COMPLETE: CPT | Performed by: NURSE PRACTITIONER

## 2020-09-01 ASSESSMENT — ENCOUNTER SYMPTOMS
SHORTNESS OF BREATH: 0
CHILLS: 0
HEADACHES: 0
NAUSEA: 0
VOMITING: 0
RHINORRHEA: 0
FEVER: 0
SORE THROAT: 0
DIARRHEA: 0
COUGH: 0

## 2020-09-02 NOTE — PATIENT INSTRUCTIONS
Patient Education     Myalgias  Myalgias are another word for muscle aches and soreness. This is a symptom, not a disease. Myalgias can have many causes. A cold, the flu, or an acute infection can cause them. So can any illness with a high fever. They may happen after exertion (such as heavy exercise) or injury (such as an accident or fall). Some medicines (such as statins and certain antidepressants) can cause myalgias. They can also be a symptom of chronic or ongoing medical problems (such as lupus, chronic fatigue, or hypothyroidism). With these illnesses, other serious symptoms often occur in addition to muscle pain and soreness.    Myalgias most often go away on their own. If they don't go away, come back, or are severe, testing may be needed to help find the cause.  Home care    Rest until you feel better.    Follow instructions that you were given for how to care for yourself. This may depend on the cause of your myalgias.     If myalgia is thought to be due to a medicine, be sure to talk to the doctor that prescribed the medicine about the best course of action.    To control pain, take prescription or over-the-counter medicines as directed. Unless told not to, you can try acetaminophen or ibuprofen.  Follow-up care  Follow up with your healthcare provider or as advised. If your symptoms do not go away in a few days or if they come back, follow up with your healthcare provider for an exam and testing.  When to see medical advice  Call your healthcare provider for any of the following:    Fever of 100.4 F (38 C) or higher, or as directed by your healthcare provider    Pain that gets worse and not better, or that goes away and comes back    New joint pains    New rash    Severe headache, neck pain, drowsiness, or confusion  Date Last Reviewed: 3/1/2017    2980-4121 [employer]. [address] [place] [address], [place] [address]. All rights reserved. This information is not intended as a substitute for  professional medical care. Always follow your healthcare professional's instructions.

## 2020-09-02 NOTE — PROGRESS NOTES
SUBJECTIVE:   Carolyn Briones is a 54 year old female presenting with a chief complaint of   Chief Complaint   Patient presents with     Musculoskeletal Problem     Left arm pain started earlier today       She is an established patient of [facility].    Left arm pain  Onset of symptoms was this afternoon.  Location: left arm  Context: no injury      Course of symptoms is worsening.    Severity moderate  Current and Associated symptoms: Pain  Denies  Swelling, Bruising and Warmth  Aggravating Factors: movement  Therapies to improve symptoms include: none  This is the first time this type of problem has occurred for this patient.       Review of Systems   Constitutional: Negative for chills and fever.   HENT: Negative for congestion, ear pain, rhinorrhea and sore throat.    Respiratory: Negative for cough and shortness of breath.    Gastrointestinal: Negative for diarrhea, nausea and vomiting.   Musculoskeletal:        Left arm pain   Neurological: Negative for headaches.   All other systems reviewed and are negative.      Past Medical History:   Diagnosis Date     Anemia      Empty sella (H)     only partially empty; possibly normal vaiant     Exposure to other ionizing radiation, subsequent encounter 1986    [place]     GERD (gastroesophageal reflux disease)      Hypercholesterolemia      Hypertension      Iron deficiency      Multiple thyroid nodules     subcm     Family History   Problem Relation Age of Onset     Lipids Mother      Cancer Mother         desmoplastic mesothelioma     C.A.D. Father         CABG age 65     Thyroid Disease No family hx of      Thyroid Cancer No family hx of      Current Outpatient Medications   Medication Sig Dispense Refill     atorvastatin (LIPITOR) 10 MG tablet Take 20 mg by mouth daily        LORazepam (ATIVAN) 0.5 MG tablet Take 0.5 mg by mouth as needed  2     metoprolol (TOPROL-XL) 25 MG 24 hr tablet   0     PREVACID 30 MG OR CPDR 1 CAPSULE DAILY BEFORE EATING        escitalopram (LEXAPRO) 5 MG tablet Take 5 mg by mouth       Multiple Vitamin (MULTI-VITAMINS PO) Take  by mouth daily.       predniSONE (DELTASONE) 5 MG tablet 4tab=20 mg qd x 5 days, 3tab=15 mg qd x 5 days, 2tab=10 mg qd x 5 days, 1 tab=5 mg qd x 5 days then stop. (Patient not taking: Reported on 4/17/2019.) 90 tablet 2     Social History     Tobacco Use     Smoking status: Never Smoker     Smokeless tobacco: Never Used   Substance Use Topics     Alcohol use: No       OBJECTIVE  /83   Pulse 75   Temp 98  F (36.7  C) (Tympanic)   Resp 12   Wt 65.3 kg (144 lb)   LMP 10/04/2013   SpO2 97%   BMI 24.91 kg/m      Physical Exam  Vitals signs and nursing note reviewed.   Constitutional:       General: She is not in acute distress.     Appearance: She is well-developed. She is not diaphoretic.   HENT:      Head: Normocephalic and atraumatic.      Right Ear: Tympanic membrane and external ear normal.      Left Ear: Tympanic membrane and external ear normal.   Eyes:      Pupils: Pupils are equal, round, and reactive to light.   Neck:      Musculoskeletal: Normal range of motion and neck supple.   Pulmonary:      Effort: Pulmonary effort is normal. No respiratory distress.      Breath sounds: Normal breath sounds.   Musculoskeletal:      Comments: Left arm tenderness, worse with movement. No bruising or swelling noted. ROM is intact.   Lymphadenopathy:      Cervical: No cervical adenopathy.   Skin:     General: Skin is warm and dry.   Neurological:      Mental Status: She is alert.      Cranial Nerves: No cranial nerve deficit.         ASSESSMENT:      ICD-10-CM    1. Left arm pain  M79.602 EKG 12-lead complete w/read - Clinics        Medical Decision Making:    Differential Diagnosis:  Pinched nerve, sprain, fracture, tendonitis, muscle strain, contusion, dislocation and osteoarthritis         EKG Interpretation:      Interpreted by AJ Ko CNP      NORMAL sinus rhythm      PLAN:  Rest painful  area  ICE  Elevated  Pain medication as advised  Side effects of medication discussed  As pain subsides, stretching is advised  Consider follow up with PCP if pain is persisting after symptomatic treatment  All questions answered and patient is in agreement with treatment paln    There are no Patient Instructions on file for this visit.

## 2020-11-22 ENCOUNTER — HEALTH MAINTENANCE LETTER (OUTPATIENT)
Age: 55
End: 2020-11-22

## 2020-11-24 ENCOUNTER — APPOINTMENT (OUTPATIENT)
Dept: URBAN - METROPOLITAN AREA CLINIC 259 | Age: 55
Setting detail: DERMATOLOGY
End: 2020-11-25

## 2020-11-24 DIAGNOSIS — Z71.89 OTHER SPECIFIED COUNSELING: ICD-10-CM

## 2020-11-24 DIAGNOSIS — L91.8 OTHER HYPERTROPHIC DISORDERS OF THE SKIN: ICD-10-CM

## 2020-11-24 DIAGNOSIS — L82.1 OTHER SEBORRHEIC KERATOSIS: ICD-10-CM

## 2020-11-24 DIAGNOSIS — L81.4 OTHER MELANIN HYPERPIGMENTATION: ICD-10-CM

## 2020-11-24 DIAGNOSIS — D18.0 HEMANGIOMA: ICD-10-CM

## 2020-11-24 DIAGNOSIS — L57.8 OTHER SKIN CHANGES DUE TO CHRONIC EXPOSURE TO NONIONIZING RADIATION: ICD-10-CM

## 2020-11-24 DIAGNOSIS — L82.0 INFLAMED SEBORRHEIC KERATOSIS: ICD-10-CM

## 2020-11-24 DIAGNOSIS — D22 MELANOCYTIC NEVI: ICD-10-CM

## 2020-11-24 PROBLEM — D22.5 MELANOCYTIC NEVI OF TRUNK: Status: ACTIVE | Noted: 2020-11-24

## 2020-11-24 PROBLEM — D18.01 HEMANGIOMA OF SKIN AND SUBCUTANEOUS TISSUE: Status: ACTIVE | Noted: 2020-11-24

## 2020-11-24 PROCEDURE — 99203 OFFICE O/P NEW LOW 30 MIN: CPT | Mod: 25

## 2020-11-24 PROCEDURE — OTHER LIQUID NITROGEN: OTHER

## 2020-11-24 PROCEDURE — 17110 DESTRUCT B9 LESION 1-14: CPT

## 2020-11-24 PROCEDURE — OTHER COUNSELING: OTHER

## 2020-11-24 ASSESSMENT — LOCATION SIMPLE DESCRIPTION DERM
LOCATION SIMPLE: ABDOMEN
LOCATION SIMPLE: LEFT LOWER BACK
LOCATION SIMPLE: LEFT CHEEK
LOCATION SIMPLE: CHEST
LOCATION SIMPLE: LEFT POSTERIOR UPPER ARM
LOCATION SIMPLE: RIGHT LOWER BACK
LOCATION SIMPLE: RIGHT UPPER BACK
LOCATION SIMPLE: UPPER BACK
LOCATION SIMPLE: LEFT UPPER BACK

## 2020-11-24 ASSESSMENT — LOCATION DETAILED DESCRIPTION DERM
LOCATION DETAILED: LEFT INFERIOR MEDIAL UPPER BACK
LOCATION DETAILED: UPPER STERNUM
LOCATION DETAILED: PERIUMBILICAL SKIN
LOCATION DETAILED: RIGHT SUPERIOR UPPER BACK
LOCATION DETAILED: LEFT INFERIOR CENTRAL MALAR CHEEK
LOCATION DETAILED: RIGHT SUPERIOR LATERAL LOWER BACK
LOCATION DETAILED: LEFT MEDIAL UPPER BACK
LOCATION DETAILED: INFERIOR THORACIC SPINE
LOCATION DETAILED: RIGHT SUPERIOR MEDIAL UPPER BACK
LOCATION DETAILED: LEFT PROXIMAL POSTERIOR UPPER ARM
LOCATION DETAILED: RIGHT RIB CAGE
LOCATION DETAILED: LEFT INFERIOR LATERAL MIDBACK

## 2020-11-24 ASSESSMENT — LOCATION ZONE DERM
LOCATION ZONE: FACE
LOCATION ZONE: TRUNK
LOCATION ZONE: ARM

## 2020-11-24 NOTE — PROCEDURE: COUNSELING
Detail Level: Generalized
Detail Level: Zone
Detail Level: Simple
Detail Level: Detailed
Patient Specific Counseling (Will Not Stick From Patient To Patient): \\nPatient advised to use CeraveSA to help soften lesions

## 2020-11-24 NOTE — PROCEDURE: LIQUID NITROGEN
Render In Bullet Format When Appropriate: No
Render Post Care In The Note?: yes
Consent: The patient's consent was obtained including but not limited to risks of crusting, scabbing, blistering, scarring, darker or lighter pigmentary change, recurrence, incomplete removal and infection.
Duration Of Freeze Thaw-Cycle (Seconds): 0
Detail Level: Zone
Total Number Of Lesions Treated: 5
Post-Care Instructions: I reviewed with the patient in detail post-care instructions. Patient is to wear sunprotection, and avoid picking at any of the treated lesions. Pt may apply Vaseline to crusted or scabbing areas.
Medical Necessity Clause: This procedure was medically necessary because the lesions that were treated were:
Medical Necessity Information: It is in your best interest to select a reason for this procedure from the list below. All of these items fulfill various CMS LCD requirements except the new and changing color options.

## 2020-11-30 ENCOUNTER — APPOINTMENT (OUTPATIENT)
Dept: URBAN - METROPOLITAN AREA CLINIC 259 | Age: 55
Setting detail: DERMATOLOGY
End: 2020-12-01

## 2020-11-30 DIAGNOSIS — B02.9 ZOSTER WITHOUT COMPLICATIONS: ICD-10-CM

## 2020-11-30 PROCEDURE — OTHER COUNSELING: OTHER

## 2020-11-30 PROCEDURE — 99213 OFFICE O/P EST LOW 20 MIN: CPT | Mod: 24

## 2020-11-30 PROCEDURE — OTHER PRESCRIPTION: OTHER

## 2020-11-30 RX ORDER — VALACYCLOVIR HYDROCHLORIDE 1 G/1
TABLET, FILM COATED ORAL TID
Qty: 21 | Refills: 0 | Status: ERX | COMMUNITY
Start: 2020-11-30

## 2020-11-30 ASSESSMENT — LOCATION ZONE DERM: LOCATION ZONE: TRUNK

## 2020-11-30 ASSESSMENT — LOCATION SIMPLE DESCRIPTION DERM: LOCATION SIMPLE: RIGHT UPPER BACK

## 2020-11-30 ASSESSMENT — LOCATION DETAILED DESCRIPTION DERM: LOCATION DETAILED: RIGHT LATERAL UPPER BACK

## 2020-11-30 NOTE — PROCEDURE: COUNSELING
Detail Level: Detailed
Patient Specific Counseling (Will Not Stick From Patient To Patient): \\nI discussed with patient that I cannot confirm a diagnosis of herpes zoster since she does not have a classic vesicular rash, however she has many of the other symptoms and I recommend treatment.

## 2021-01-07 ENCOUNTER — TRANSFERRED RECORDS (OUTPATIENT)
Dept: HEALTH INFORMATION MANAGEMENT | Facility: CLINIC | Age: 56
End: 2021-01-07

## 2021-01-12 ENCOUNTER — TRANSFERRED RECORDS (OUTPATIENT)
Dept: HEALTH INFORMATION MANAGEMENT | Facility: CLINIC | Age: 56
End: 2021-01-12

## 2021-01-13 ENCOUNTER — TRANSFERRED RECORDS (OUTPATIENT)
Dept: HEALTH INFORMATION MANAGEMENT | Facility: CLINIC | Age: 56
End: 2021-01-13

## 2021-01-15 ENCOUNTER — TELEPHONE (OUTPATIENT)
Dept: NEUROSURGERY | Facility: CLINIC | Age: 56
End: 2021-01-15

## 2021-01-15 NOTE — TELEPHONE ENCOUNTER
[facility] Call Center    Phone Message    May a detailed message be left on voicemail: yes     Reason for Call: Other: Carolyn calling to schedule apt for her back problems with Dr. Guevara. Records at [facility]. Please review and call her back to schedule apt.      Action Taken: Message routed to:  [facility]  neurosurg     Travel Screening: Not Applicable

## 2021-01-21 NOTE — TELEPHONE ENCOUNTER
SPINE PATIENTS - NEW PROTOCOL PREVISIT    RECORDS RECEIVED FROM: Self   Date of Appt: 2/2/21   NOTES (FOR ALL VISITS) STATUS DETAILS   OFFICE NOTE from referring provider N/A    OFFICE NOTE from other specialist Received Re CUBA @ [facility]:  1/12/21    Dr Sharon Freitas @ [facility]:  12/29/20    Dr Marilee Flowers @ [facility]:  5/22/20 6/18/19    Dr Simon Bal @ [facility] [place]:  5/21/20    Tana Rollins NP @ [facility] [place]:  5/7/20    Dr Artur Thomas @ [facility] [place]:  7/2/18  6/14/18  10/13/17   DISCHARGE SUMMARY from hospital N/A    DISCHARGE REPORT from ER N/A    EMG REPORT N/A    MEDICATION LIST Internal    IMAGING  (FOR ALL VISITS)     MRI (HEAD, NECK, SPINE) Received [facility]:  MRI Lumbar Spine 1/13/21  MRI Orbits 1/7/21  MRI Thoracic Spine 1/7/21  MRI Cervical Spine 1/7/21      [facility]:  MRI Lumbar Spine 5/14/20    [facility]:  MRI Lumbar Spine 6/16/18   XRAY (SPINE) *NEUROSURGERY* N/A    CT (HEAD, NECK, SPINE) N/A       Action 1/21/21 MV 9.21am   Action Taken Records request faxed to [facility]     Action 1/22/21 MV 6.56am   Action Taken Records received from [facility] via fax. Sent to scanning    Waiting for images  --- received images on disc from [facility]   MRI Lumbar Spine 1/13/21  MRI Orbits 1/7/21  MRI Thoracic Spine 1/7/21  MRI Cervical Spine 1/7/21     -- sent to [facility]lysses for uploading on 4th floor---

## 2021-02-02 ENCOUNTER — PRE VISIT (OUTPATIENT)
Dept: NEUROSURGERY | Facility: CLINIC | Age: 56
End: 2021-02-02

## 2021-02-02 ENCOUNTER — VIRTUAL VISIT (OUTPATIENT)
Dept: NEUROSURGERY | Facility: CLINIC | Age: 56
End: 2021-02-02
Payer: COMMERCIAL

## 2021-02-02 DIAGNOSIS — M54.16 LUMBAR RADICULOPATHY: Primary | ICD-10-CM

## 2021-02-02 DIAGNOSIS — R20.8 DYSESTHESIA: ICD-10-CM

## 2021-02-02 PROCEDURE — 99205 OFFICE O/P NEW HI 60 MIN: CPT | Mod: 95 | Performed by: NEUROLOGICAL SURGERY

## 2021-02-02 NOTE — PROGRESS NOTES
Service Date: 2021      RE: Carolyn Briones   MRN: [id number]   : 1965      Dear Doctor:      Ms. Carolyn Briones was spoken to by telephone visit in Neurosurgery Clinic today, 2021.  This was a telephone visit due to COVID-19 and the video option was not functioning properly.  This visit took approximately 30 minutes with another 40 minutes spent reviewing the chart and the images.  The patient gave her consent.      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  It was difficult to tell exactly why she was referred to my clinic, as the last note I have discusses right eye pain and right thoracic pain.  That would be a note from [facility] dated .  However, I do gather from this note that in the course of an extensive workup she had a lumbar MRI which demonstrated a small lumbar disk.  I will assume she was sent to me for the disk.      Ms. Briones has a complicated picture.  Her major complaint today is a crawling sensation and coldness in her arms and legs.  She states this began on .  She describes this as being in both her inner and outer legs in no specific radicular distribution.  She states she lifted something heavy on  and these symptoms again in both her arms and legs started after that time.  She currently does not have any low back pain.  She states that since she made this appointment approximately 8 days ago she now has what she describes as left sciatic nerve pain which she has pain in her leg in certain positions.  This is described, however, as being in only her buttocks and a little lower than that but not even down to her knee and not past her knee.      In 2018, she tells me that she had some back pain and went to a chiropractor and subsequently had the same pain on the right side, which she had for 2-1/2 years.  She states she tried physical therapy and an injection, which did not really help, but this past summer, the  pain resolved on its own.  She also tells me of a history of right-sided thoracic shingles, which she had last year on her upper back.  Again, she did see a neurologist for this last on 01/12/2021.  She also informs me that she initially saw this neurologist because of burning in the soles of her feet.  She said that her B12 was low and so she took supplements and that helped.      Today I have some imaging available, which is from this January.  She has an MRI of her orbits, which is normal.  A brain MRI which is normal.  A cervical MRI which demonstrates some small disk herniations, but no compression of the spinal cord or nerve roots.  Her thoracic MRI also does not have any significant abnormality.  Her lumbar MRI is dated 01/13/2021.  This demonstrates a small broad-based disk herniation at L4-L5.  There is some abutment of the nerve roots, worse on the left side.      In summary, Ms. Briones has a varied group of symptoms.  Her most worrisome to her is this crawling sensation and coldness in her arms and legs.  I explained to her that the lumbar disk could not be creating arm symptoms.  I also do not see anything on the cervical spine that would account for this.  Also, what she describes to me does not really sound like sciatica as it does not extend very far down her leg.  While it is possible that this new onset left leg pain could be caused by that disk, it has only been around for 8 days and so she would benefit from conservative measures and this really does not seem like a surgical problem.      I discussed all this with Ms. Briones and answered her questions.  At this point, she requests a second opinion from a neurologist.  I will set her up with one of our neurologists here as she requests.      It was my pleasure to meet her today in clinic.      Sincerely,         TIP BATES MD             D: 02/02/2021   T: 02/02/2021   MT: enoch      Name:     MING BRIONES   MRN:      [id number]         Account:      [id number]   :      1965           Service Date: 2021      Document: [id number]

## 2021-02-02 NOTE — LETTER
2021       RE: Carolyn Briones  [address]  [place] [address]     Dear Colleague,    Thank you for referring your patient, Carolyn Briones, to the [facility] NEUROSURGERY CLINIC [place] at [facility]. Please see a copy of my visit note below.    Service Date: 2021      RE: Carolyn Briones   MRN: [id number]   : 1965      Dear Doctor:      Ms. Carolyn Briones was spoken to by telephone visit in Neurosurgery Clinic today, 2021.  This was a telephone visit due to COVID-19 and the video option was not functioning properly.  This visit took approximately 30 minutes with another 40 minutes spent reviewing the chart and the images.  The patient gave her consent.      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  It was difficult to tell exactly why she was referred to my clinic, as the last note I have discusses right eye pain and right thoracic pain.  That would be a note from [facility] dated .  However, I do gather from this note that in the course of an extensive workup she had a lumbar MRI which demonstrated a small lumbar disk.  I will assume she was sent to me for the disk.      Ms. Briones has a complicated picture.  Her major complaint today is a crawling sensation and coldness in her arms and legs.  She states this began on .  She describes this as being in both her inner and outer legs in no specific radicular distribution.  She states she lifted something heavy on  and these symptoms again in both her arms and legs started after that time.  She currently does not have any low back pain.  She states that since she made this appointment approximately 8 days ago she now has what she describes as left sciatic nerve pain which she has pain in her leg in certain positions.  This is described, however, as being in only her buttocks and a little lower than that but not even down to  her knee and not past her knee.      In 2018, she tells me that she had some back pain and went to a chiropractor and subsequently had the same pain on the right side, which she had for 2-1/2 years.  She states she tried physical therapy and an injection, which did not really help, but this past summer, the pain resolved on its own.  She also tells me of a history of right-sided thoracic shingles, which she had last year on her upper back.  Again, she did see a neurologist for this last on 01/12/2021.  She also informs me that she initially saw this neurologist because of burning in the soles of her feet.  She said that her B12 was low and so she took supplements and that helped.      Today I have some imaging available, which is from this January.  She has an MRI of her orbits, which is normal.  A brain MRI which is normal.  A cervical MRI which demonstrates some small disk herniations, but no compression of the spinal cord or nerve roots.  Her thoracic MRI also does not have any significant abnormality.  Her lumbar MRI is dated 01/13/2021.  This demonstrates a small broad-based disk herniation at L4-L5.  There is some abutment of the nerve roots, worse on the left side.      In summary, Ms. Briones has a varied group of symptoms.  Her most worrisome to her is this crawling sensation and coldness in her arms and legs.  I explained to her that the lumbar disk could not be creating arm symptoms.  I also do not see anything on the cervical spine that would account for this.  Also, what she describes to me does not really sound like sciatica as it does not extend very far down her leg.  While it is possible that this new onset left leg pain could be caused by that disk, it has only been around for 8 days and so she would benefit from conservative measures and this really does not seem like a surgical problem.      I discussed all this with Ms. Briones and answered her questions.  At this point, she requests a second  opinion from a neurologist.  I will set her up with one of our neurologists here as she requests.      It was my pleasure to meet her today in clinic.      Sincerely,         TIP BATES MD             D: 2021   T: 2021   MT: enoch      Name:     MING HOWARD   MRN:      [id number]        Account:      [id number]   :      1965           Service Date: 2021      Document: [id number]

## 2021-02-05 ENCOUNTER — TELEPHONE (OUTPATIENT)
Dept: NEUROSURGERY | Facility: CLINIC | Age: 56
End: 2021-02-05

## 2021-02-05 NOTE — TELEPHONE ENCOUNTER
Routed to Nurse Pool Attention Troy Reyna, RNCC for Dr. Guevara.     Juana Moses,CARLOSN  Neurosurgery

## 2021-02-05 NOTE — TELEPHONE ENCOUNTER
[facility] Call Center    Phone Message    May a detailed message be left on voicemail: yes     Reason for Call: Other: Pt called and was extremely angry because she reports Dr. Guevara had advised juana that someone would call her either Tuesday or Wednesday to scheduled appts based on referral that Dr. Deluca was going to submit. No referrals are on system yet and Pt again is very angry that no one called as promised. She wants call back ASAP to explain why these referrals are not entered and wants to get these appts scheduled.     Writer offered caller to be transferred to Patient Relations as Pt continued to complain about not getting a call about these referrals and  also about how long she had to wait for her appt on Tuesday with Dr. Guevara. PT refused to be transferred to Pt relations.    Please call Pt to advise of referral.    Action Taken: Message routed to:  [facility] ([facility] Neurosurgery    Travel Screening: Not Applicable

## 2021-02-12 ENCOUNTER — TELEPHONE (OUTPATIENT)
Dept: PODIATRY | Facility: CLINIC | Age: 56
End: 2021-02-12

## 2021-02-12 ENCOUNTER — MYC MEDICAL ADVICE (OUTPATIENT)
Dept: PODIATRY | Facility: CLINIC | Age: 56
End: 2021-02-12

## 2021-02-12 NOTE — LETTER
February 12, 2021      RE: Carolyn Briones  [address]   [facility] [address]           To Whom it may concern;    Carolyn's foot Capsulitis, Plantar Fascitis and foot pain would likely benefit from the use of a foot massager.  Please consider coverage for this device to help resolve her symptoms and prevent further treatments    Sincerely,      Maximiliano Willett DPM

## 2021-02-12 NOTE — TELEPHONE ENCOUNTER
[facility] Call Center    Phone Message    May a detailed message be left on voicemail: yes     Reason for Call: Patient request a call back to discuss a request for letter regarding condition. Please call back to discuss.     Action Taken: Message routed to:  Adult Clinics: Podiatry p [phone]    Travel Screening: Not Applicable

## 2021-02-12 NOTE — TELEPHONE ENCOUNTER
Returned a call to patient.  She also sent a [employer] message today.  She is requesting a letter of medical necessity to purchase a foot massager with her health savings account funds. She would like to use the massager to assist in treating her plantar fasciitis and neuropathy.      Patient was last seen by Dr. Willett nearly 2 years ago on 3/15/2019.    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  If Dr. Willett is able to write the patient a letter of medical necessity for the foot massager she can print the letter from [employer].    Will close this encounter and route [employer] encounter to Dr. Willett for review.

## 2021-02-12 NOTE — TELEPHONE ENCOUNTER
She also left a phone message today.  Returned a call to patient.  She is requesting a letter of medical necessity to purchase a foot massager with her health savings account funds. She would like to use the massager to assist in treating her plantar fasciitis and neuropathy.       Patient was last seen by Dr. Willett nearly 2 years ago on 3/15/2019.     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  If Dr. Willett is able to write the patient a letter of medical necessity for the foot massager she can print the letter from [employer].

## 2021-03-01 NOTE — PROGRESS NOTES
Carolyn is a 55 year old who is being evaluated via a billable video visit.      How would you like to obtain your AVS? M[employer]ot[employer]    Text video visit link to: [phone] ([employer])    Will anyone else be joining your video visit? No    Tiffani Fuchs MA

## 2021-03-02 ENCOUNTER — VIRTUAL VISIT (OUTPATIENT)
Dept: ENDOCRINOLOGY | Facility: CLINIC | Age: 56
End: 2021-03-02
Payer: COMMERCIAL

## 2021-03-02 DIAGNOSIS — E23.6 EMPTY SELLA (H): ICD-10-CM

## 2021-03-02 DIAGNOSIS — E04.1 THYROID NODULE: Primary | ICD-10-CM

## 2021-03-02 DIAGNOSIS — R20.9 DISTURBANCE OF SKIN SENSATION: ICD-10-CM

## 2021-03-02 DIAGNOSIS — R53.83 OTHER FATIGUE: ICD-10-CM

## 2021-03-02 PROCEDURE — 99215 OFFICE O/P EST HI 40 MIN: CPT | Mod: 95
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
## 2021-03-03 ENCOUNTER — DOCUMENTATION ONLY (OUTPATIENT)
Dept: CARE COORDINATION | Facility: CLINIC | Age: 56
End: 2021-03-03

## 2021-03-09 ENCOUNTER — PRE VISIT (OUTPATIENT)
Dept: NEUROLOGY | Facility: CLINIC | Age: 56
End: 2021-03-09

## 2021-03-09 NOTE — TELEPHONE ENCOUNTER
FUTURE VISIT INFORMATION      FUTURE VISIT INFORMATION:    Date: 3/12/2021    Time: 415pm    Location: [facility]  REFERRAL INFORMATION:    Referring provider:  Dr. Guevara    Referring providers clinic:  [facility] Neurosurgery    Reason for visit/diagnosis  Dysesthesia     RECORDS REQUESTED FROM:       Clinic name Comments Records Status Imaging Status   Internal Dr. Guevara-2/2/2021    MRI Lumbar Spine -5/14/2020, 6/16/2018 Epic N/A          Christian  Scanned to Chart  PACS

## 2021-03-11 NOTE — PROGRESS NOTES
[facility] Neurology Consultation    Carolyn Briones MRN# [id number]   Age: 55 year old YOB: 1965     Requesting physician: Gentry Oneil     Reason for Consultation: abnormal sensations      History of Presenting Symptoms:   Carolyn Briones is a 55 year old female who presents today for evaluation of abnormal sensations.    In November 2020 patient started feeling a burning feeling in the soles of the both feet. This typically happens at night. It is still present, but doesn't bother her as much. Currently she is noticing the sensation about twice per week. She denies any pain with the sensation. She tries to distract herself to fall asleep because of the sensation.    Around thanksgiving she had possible shingles in the right upper back area. She had sharp pain in the area. She saw her primary who didn't think it was shingles. She had one individual pimple in that region. She saw a dermatologist who had her take antiviral just in case. Pain got worse and she developed sensitivity in the skin. Pain is now better.     On January 1st she started feeling cold and crawling sensation around the bilateral knees and thighs. She still notices this intermittently.    More recently patient began feeling cold sensations in her face, neck, arms, and shoulders. This has become more prevalent.     No changes in sweating frequency. No changes in bowel habits. No significant joint pains.     Patient is post menopausal. She denies any night sweats.     Patient also reports plantar fascitis. She reports the burning sensation is different than the plantar fascitis pain.     She was seen on [facility] and had multiple MRI. She was referred to Dr Guevara who did not see a cause of symptoms which required surgery.     She had a bilateral lower extremity EMG at [facility] 3 weeks ago. She was told this was normal.       Past Medical History:     Patient Active Problem List   Diagnosis      Anemia, iron deficiency     CARDIOVASCULAR SCREENING; LDL GOAL LESS THAN 160     Hand pain     Vitamin D deficiency     Female stress incontinence     Iron deficiency anemia due to chronic blood loss     Perimenopause     Stress at home     Thyroid nodule     Neck pain     Empty sella (H)     Other fatigue     Disturbance of skin sensation     Past Medical History:   Diagnosis Date     Anemia      Empty sella (H)     only partially empty; possibly normal vaiant     Exposure to other ionizing radiation, subsequent encounter 1986    [place]     GERD (gastroesophageal reflux disease)      Hypercholesterolemia      Hypertension      Iron deficiency      Multiple thyroid nodules     subcm        Past Surgical History:     Past Surgical History:   Procedure Laterality Date     APPENDECTOMY       COLONOSCOPY       DILATION AND CURETTAGE SUCTION  11/13/2012    Procedure: DILATION AND CURETTAGE SUCTION;  Suction Dilation & Curettage    (8 weeks);  Surgeon: Ken Rose MD;  [facility]     EXCISE GANGLION WRIST  4/10/2014    Procedure: EXCISE GANGLION WRIST;  Excision Ganglion Cyst, Left Wrist;  Surgeon: Ashley Garcia MD;  [facility]     OPERATIVE HYSTEROSCOPY WITH MORCELLATOR  1/19/2012    Procedure:OPERATIVE HYSTEROSCOPY WITH MORCELLATOR; Hysteroscopy & Polypectomy With Morcellator; Surgeon:KEN ROSE; [facility]        Social History:     Social History     Tobacco Use     Smoking status: Never Smoker     Smokeless tobacco: Never Used   Substance Use Topics     Alcohol use: No     Drug use: No        Family History:     Family History   Problem Relation Age of Onset     Lipids Mother      Cancer Mother         desmoplastic mesothelioma     C.A.D. Father         CABG age 65     Thyroid Disease No family hx of      Thyroid Cancer No family hx of         Medications:     Current Outpatient Medications   Medication Sig     atorvastatin (LIPITOR) 20 MG tablet Take 20 mg by mouth daily       Iron-Vitamin C (IRON 100/C) 100-250 MG TABS infusion     LANsoprazole (PREVACID 24HR) 15 MG DR capsule 1 capsule before a meal     LANsoprazole (PREVACID) 30 MG DR capsule TAKE 1 CAPSULE BY MOUTH ONCE DAILYBEFORE A MEAL     LORazepam (ATIVAN) 0.5 MG tablet Take 0.5 mg by mouth as needed     metoprolol (TOPROL-XL) 25 MG 24 hr tablet      metoprolol succinate ER (TOPROL-XL) 25 MG 24 hr tablet TAKE ONE TABLET BY MOUTH ONE TIME DAILY     PREVACID 30 MG OR CPDR 1 CAPSULE DAILY BEFORE EATING     vitamin C (ASCORBIC ACID) 500 MG/5ML liquid 1 tablet     Magnesium 400 MG CAPS as directed     Zinc 25 MG TABS 1 tablet     No current facility-administered medications for this visit.         Allergies:     Allergies   Allergen Reactions     Iron Dextran Shortness Of Breath     Doxycycline Other (See Comments)     High blood pressure     Cipro [Ciprofloxacin] Nausea     Liquid Adhesive Itching     Sulphadimidine [Sulfa Drugs] Nausea     Adhesive Tape Rash        Review of Systems:   As above     Physical Exam:   Vitals: /76   Pulse 68   Resp 16   Wt 65.3 kg (144 lb)   LMP 10/04/2013   SpO2 98%   BMI 24.91 kg/m     General: Seated comfortably in no acute distress.  HEENT: Neck supple with normal range of motion. No paracervical muscle tenderness or tightness.  Optic discs sharp and vasculature normal on funduscopic exam.   Lungs: breathing comfortably  Extremities: no edema  Skin: No rashes  Neurologic:     Mental Status: Fully alert, attentive and oriented. Normal memory and fund of knowledge. Language normal, speech clear and fluent, no paraphasic errors.      Cranial Nerves: Visual fields intact. PERRL. EOMI with normal smooth pursuit. Facial sensation intact/symmetric. Facial movements symmetric. Hearing not formally tested but intact to conversation. Palate elevation symmetric, uvula midline. No dysarthria. Shoulder shrug strong bilaterally. Tongue protrusion midline.     Motor: No tremors or other abnormal  movements observed. Muscle tone normal throughout. Normal/symmetric rapid finger tapping. Strength 5/5 throughout upper and lower extremities.     Deep Tendon Reflexes: 2+/symmetric throughout upper and lower extremities. Toes downgoing bilaterally.     Sensory: Intact/symmetric to light touch, pinprick, temperature, vibration and proprioception throughout upper and lower extremities. Vibration is ~20 seconds in bilateral great toes and about 30 seconds in index fingers. Negative Romberg.      Coordination: Finger-nose-finger and heel-shin intact without dysmetria. Rapid alternating movements intact/symmetric with normal speed and rhythm.     Gait: Normal, steady casual gait. Able to walk on toes, heels and tandem without difficulty.         Data: Pertinent prior to visit   Imaging:  MRI lumbar spine 1/13/2021      MRI thoracic 1/7/2021      MRI cervical 1/7/2021      MRI orbits 1/7/2021      MRI brain 1/7/2021      Procedures:  EMG 3 weeks ago at [facility] reportedly normal    Laboratory:  None         Assessment and Plan:   Assessment:  Carolyn Briones is a 55 year old female who presents today for evaluation of abnormal sensations. She reports intermittent burning in the soles of the feet, cold/crawling sensation in the thighs, and coldness sensation in the face, neck, shoulders, and arms. Neurological exam is normal today. Recent MRI brain, cervical, thoracic, and lumbar scan were non revealing as to cause. She reportedly had a normal EMG 3 weeks ago at [facility]. We will check for various causes of small fiber neuropathy today as below. Patient is to follow-up in clinic in 1 month after lab results.      Plan:  - A1c, B12, B6, ELP, Immunofixation, ESR, CRP, YULY, SSA/SSB, TTG Ab, TSH  - Follow-up after labs    Follow up in Neurology clinic in 1 month or earlier as needed should new concerns arise.    Trenton Rodriguez MD   of Neurology  [facility]    The total time of this encounter amounted  to 65 minutes. This time included time spent with the patient, prep work, ordering tests, and performing post visit documentation.

## 2021-03-12 ENCOUNTER — OFFICE VISIT (OUTPATIENT)
Dept: NEUROLOGY | Facility: CLINIC | Age: 56
End: 2021-03-12
Attending: NEUROLOGICAL SURGERY
Payer: COMMERCIAL

## 2021-03-12 VITALS
DIASTOLIC BLOOD PRESSURE: 76 MMHG | BODY MASS INDEX: 24.91 KG/M2 | RESPIRATION RATE: 16 BRPM | SYSTOLIC BLOOD PRESSURE: 112 MMHG | HEART RATE: 68 BPM | OXYGEN SATURATION: 98 % | WEIGHT: 144 LBS

## 2021-03-12 DIAGNOSIS — R20.8 DYSESTHESIA: ICD-10-CM

## 2021-03-12 LAB
CRP SERPL-MCNC: <2.9 MG/L (ref 0–8)
ERYTHROCYTE [SEDIMENTATION RATE] IN BLOOD BY WESTERGREN METHOD: 12 MM/H (ref 0–30)
HBA1C MFR BLD: 5.4 % (ref 0–5.6)
TSH SERPL DL<=0.005 MIU/L-ACNC: 0.7 MU/L (ref 0.4–4)
VIT B12 SERPL-MCNC: 509 PG/ML (ref 193–986)

## 2021-03-12 PROCEDURE — 86038 ANTINUCLEAR ANTIBODIES: CPT | Mod: 90 | Performed by: PATHOLOGY

## 2021-03-12 PROCEDURE — 85652 RBC SED RATE AUTOMATED: CPT | Performed by: PATHOLOGY

## 2021-03-12 PROCEDURE — 86334 IMMUNOFIX E-PHORESIS SERUM: CPT | Mod: 90 | Performed by: PATHOLOGY

## 2021-03-12 PROCEDURE — 84165 PROTEIN E-PHORESIS SERUM: CPT | Mod: 90 | Performed by: PATHOLOGY

## 2021-03-12 PROCEDURE — 83036 HEMOGLOBIN GLYCOSYLATED A1C: CPT | Performed by: PATHOLOGY

## 2021-03-12 PROCEDURE — 36415 COLL VENOUS BLD VENIPUNCTURE: CPT | Performed by: PATHOLOGY

## 2021-03-12 PROCEDURE — 83516 IMMUNOASSAY NONANTIBODY: CPT | Mod: 90 | Performed by: PATHOLOGY

## 2021-03-12 PROCEDURE — 86235 NUCLEAR ANTIGEN ANTIBODY: CPT | Mod: 90 | Performed by: PATHOLOGY

## 2021-03-12 PROCEDURE — 82784 ASSAY IGA/IGD/IGG/IGM EACH: CPT | Mod: 90 | Performed by: PATHOLOGY

## 2021-03-12 PROCEDURE — 84443 ASSAY THYROID STIM HORMONE: CPT | Performed by: PATHOLOGY

## 2021-03-12 PROCEDURE — 99205 OFFICE O/P NEW HI 60 MIN: CPT | Performed by: INTERNAL MEDICINE

## 2021-03-12 PROCEDURE — 82607 VITAMIN B-12: CPT | Performed by: PATHOLOGY

## 2021-03-12 PROCEDURE — 84207 ASSAY OF VITAMIN B-6: CPT | Mod: 90 | Performed by: PATHOLOGY

## 2021-03-12 PROCEDURE — 86140 C-REACTIVE PROTEIN: CPT | Performed by: PATHOLOGY

## 2021-03-12 RX ORDER — MECOBALAMIN 5000 MCG
TABLET,DISINTEGRATING ORAL
COMMUNITY
End: 2021-11-16

## 2021-03-12 RX ORDER — METOPROLOL SUCCINATE 25 MG/1
TABLET, EXTENDED RELEASE ORAL
COMMUNITY
End: 2023-12-20

## 2021-03-12 RX ORDER — LANSOPRAZOLE 30 MG/1
CAPSULE, DELAYED RELEASE ORAL
COMMUNITY
End: 2021-11-16

## 2021-03-12 RX ORDER — ASCORBIC ACID 500 MG/5ML
SYRUP ORAL
COMMUNITY
Start: 2020-12-10 | End: 2021-12-28

## 2021-03-12 RX ORDER — HYDROXYZINE HCL 10 MG/5 ML
SOLUTION, ORAL ORAL
COMMUNITY
End: 2021-03-12

## 2021-03-12 RX ORDER — IRON,CARBONYL/ASCORBIC ACID 100-250 MG
TABLET ORAL
COMMUNITY
End: 2021-12-28

## 2021-03-12 ASSESSMENT — PAIN SCALES - GENERAL: PAINLEVEL: NO PAIN (0)

## 2021-03-12 NOTE — LETTER
3/12/2021       RE: Carolyn Briones  [address]  [place] [address]     Dear Colleague,    Thank you for referring your patient, Carolyn Briones, to the [facility] NEUROLOGY CLINIC [place] at [facility]. Please see a copy of my visit note below.    [facility] Neurology Consultation    Carolyn Briones MRN# [id number]   Age: 55 year old YOB: 1965     Requesting physician: Gentry Oneil     Reason for Consultation: abnormal sensations      History of Presenting Symptoms:   Carolyn Briones is a 55 year old female who presents today for evaluation of abnormal sensations.    In November 2020 patient started feeling a burning feeling in the soles of the both feet. This typically happens at night. It is still present, but doesn't bother her as much. Currently she is noticing the sensation about twice per week. She denies any pain with the sensation. She tries to distract herself to fall asleep because of the sensation.    Around thanksgiving she had possible shingles in the right upper back area. She had sharp pain in the area. She saw her primary who didn't think it was shingles. She had one individual pimple in that region. She saw a dermatologist who had her take antiviral just in case. Pain got worse and she developed sensitivity in the skin. Pain is now better.     On January 1st she started feeling cold and crawling sensation around the bilateral knees and thighs. She still notices this intermittently.    More recently patient began feeling cold sensations in her face, neck, arms, and shoulders. This has become more prevalent.     No changes in sweating frequency. No changes in bowel habits. No significant joint pains.     Patient is post menopausal. She denies any night sweats.     Patient also reports plantar fascitis. She reports the burning sensation is different than the plantar fascitis pain.     She was  seen on [facility] and had multiple MRI. She was referred to Dr Guevara who did not see a cause of symptoms which required surgery.     She had a bilateral lower extremity EMG at [facility] 3 weeks ago. She was told this was normal.       Past Medical History:     Patient Active Problem List   Diagnosis     Anemia, iron deficiency     CARDIOVASCULAR SCREENING; LDL GOAL LESS THAN 160     Hand pain     Vitamin D deficiency     Female stress incontinence     Iron deficiency anemia due to chronic blood loss     Perimenopause     Stress at home     Thyroid nodule     Neck pain     Empty sella (H)     Other fatigue     Disturbance of skin sensation     Past Medical History:   Diagnosis Date     Anemia      Empty sella (H)     only partially empty; possibly normal vaiant     Exposure to other ionizing radiation, subsequent encounter 1986    [place]     GERD (gastroesophageal reflux disease)      Hypercholesterolemia      Hypertension      Iron deficiency      Multiple thyroid nodules     subcm        Past Surgical History:     Past Surgical History:   Procedure Laterality Date     APPENDECTOMY       COLONOSCOPY       DILATION AND CURETTAGE SUCTION  11/13/2012    Procedure: DILATION AND CURETTAGE SUCTION;  Suction Dilation & Curettage    (8 weeks);  Surgeon: Ken Christiansen MD;  Location: [facility] OR     EXCISE GANGLION WRIST  4/10/2014    Procedure: EXCISE GANGLION WRIST;  Excision Ganglion Cyst, Left Wrist;  Surgeon: Ashley Garcia MD;  Location: [facility]     OPERATIVE HYSTEROSCOPY WITH MORCELLATOR  1/19/2012    Procedure:OPERATIVE HYSTEROSCOPY WITH MORCELLATOR; Hysteroscopy & Polypectomy With Morcellator; Surgeon:KEN CHRISTIANSEN; Location:[facility] OR        Social History:     Social History     Tobacco Use     Smoking status: Never Smoker     Smokeless tobacco: Never Used   Substance Use Topics     Alcohol use: No     Drug use: No        Family History:     Family History   Problem Relation Age of Onset     Lipids  Mother      Cancer Mother         desmoplastic mesothelioma     C.A.D. Father         CABG age 65     Thyroid Disease No family hx of      Thyroid Cancer No family hx of         Medications:     Current Outpatient Medications   Medication Sig     atorvastatin (LIPITOR) 20 MG tablet Take 20 mg by mouth daily      Iron-Vitamin C (IRON 100/C) 100-250 MG TABS infusion     LANsoprazole (PREVACID 24HR) 15 MG DR capsule 1 capsule before a meal     LANsoprazole (PREVACID) 30 MG DR capsule TAKE 1 CAPSULE BY MOUTH ONCE DAILYBEFORE A MEAL     LORazepam (ATIVAN) 0.5 MG tablet Take 0.5 mg by mouth as needed     metoprolol (TOPROL-XL) 25 MG 24 hr tablet      metoprolol succinate ER (TOPROL-XL) 25 MG 24 hr tablet TAKE ONE TABLET BY MOUTH ONE TIME DAILY     PREVACID 30 MG OR CPDR 1 CAPSULE DAILY BEFORE EATING     vitamin C (ASCORBIC ACID) 500 MG/5ML liquid 1 tablet     Magnesium 400 MG CAPS as directed     Zinc 25 MG TABS 1 tablet     No current facility-administered medications for this visit.         Allergies:     Allergies   Allergen Reactions     Iron Dextran Shortness Of Breath     Doxycycline Other (See Comments)     High blood pressure     Cipro [Ciprofloxacin] Nausea     Liquid Adhesive Itching     Sulphadimidine [Sulfa Drugs] Nausea     Adhesive Tape Rash        Review of Systems:   As above     Physical Exam:   Vitals: /76   Pulse 68   Resp 16   Wt 65.3 kg (144 lb)   LMP 10/04/2013   SpO2 98%   BMI 24.91 kg/m     General: Seated comfortably in no acute distress.  HEENT: Neck supple with normal range of motion. No paracervical muscle tenderness or tightness.  Optic discs sharp and vasculature normal on funduscopic exam.   Lungs: breathing comfortably  Extremities: no edema  Skin: No rashes  Neurologic:     Mental Status: Fully alert, attentive and oriented. Normal memory and fund of knowledge. Language normal, speech clear and fluent, no paraphasic errors.      Cranial Nerves: Visual fields intact. PERRL. EOMI  with normal smooth pursuit. Facial sensation intact/symmetric. Facial movements symmetric. Hearing not formally tested but intact to conversation. Palate elevation symmetric, uvula midline. No dysarthria. Shoulder shrug strong bilaterally. Tongue protrusion midline.     Motor: No tremors or other abnormal movements observed. Muscle tone normal throughout. Normal/symmetric rapid finger tapping. Strength 5/5 throughout upper and lower extremities.     Deep Tendon Reflexes: 2+/symmetric throughout upper and lower extremities. Toes downgoing bilaterally.     Sensory: Intact/symmetric to light touch, pinprick, temperature, vibration and proprioception throughout upper and lower extremities. Vibration is ~20 seconds in bilateral great toes and about 30 seconds in index fingers. Negative Romberg.      Coordination: Finger-nose-finger and heel-shin intact without dysmetria. Rapid alternating movements intact/symmetric with normal speed and rhythm.     Gait: Normal, steady casual gait. Able to walk on toes, heels and tandem without difficulty.         Data: Pertinent prior to visit   Imaging:  MRI lumbar spine 1/13/2021      MRI thoracic 1/7/2021      MRI cervical 1/7/2021      MRI orbits 1/7/2021      MRI brain 1/7/2021      Procedures:  EMG 3 weeks ago at [facility] reportedly normal    Laboratory:  None         Assessment and Plan:   Assessment:  Carolyn Briones is a 55 year old female who presents today for evaluation of abnormal sensations. She reports intermittent burning in the soles of the feet, cold/crawling sensation in the thighs, and coldness sensation in the face, neck, shoulders, and arms. Neurological exam is normal today. Recent MRI brain, cervical, thoracic, and lumbar scan were non revealing as to cause. She reportedly had a normal EMG 3 weeks ago at [facility]. We will check for various causes of small fiber neuropathy today as below. Patient is to follow-up in clinic in 1 month after lab results.      Plan:  -  A1c, B12, B6, ELP, Immunofixation, ESR, CRP, YULY, SSA/SSB, TTG Ab, TSH  - Follow-up after labs    Follow up in Neurology clinic in 1 month or earlier as needed should new concerns arise.    Trenton Rodriguez MD   of Neurology  [facility]    The total time of this encounter amounted to 65 minutes. This time included time spent with the patient, prep work, ordering tests, and performing post visit documentation.

## 2021-03-15 LAB
ALBUMIN SERPL ELPH-MCNC: 4.4 G/DL (ref 3.7–5.1)
ALPHA1 GLOB SERPL ELPH-MCNC: 0.3 G/DL (ref 0.2–0.4)
ALPHA2 GLOB SERPL ELPH-MCNC: 0.8 G/DL (ref 0.5–0.9)
ANA SER QL IF: NEGATIVE
B-GLOBULIN SERPL ELPH-MCNC: 0.8 G/DL (ref 0.6–1)
ENA SS-A IGG SER IA-ACNC: <0.2 AI (ref 0–0.9)
ENA SS-B IGG SER IA-ACNC: <0.2 AI (ref 0–0.9)
GAMMA GLOB SERPL ELPH-MCNC: 0.8 G/DL (ref 0.7–1.6)
IGA SERPL-MCNC: 193 MG/DL (ref 84–499)
IGG SERPL-MCNC: 873 MG/DL (ref 610–1616)
IGM SERPL-MCNC: 60 MG/DL (ref 35–242)
M PROTEIN SERPL ELPH-MCNC: 0 G/DL
PROT PATTERN SERPL ELPH-IMP: NORMAL
PROT PATTERN SERPL IFE-IMP: NORMAL
TTG IGA SER-ACNC: 1 U/ML
TTG IGG SER-ACNC: <1 U/ML

## 2021-03-16 LAB — VIT B6 SERPL-MCNC: 48.5 NMOL/L (ref 20–125)

## 2021-03-17 ENCOUNTER — MYC MEDICAL ADVICE (OUTPATIENT)
Dept: NEUROLOGY | Facility: CLINIC | Age: 56
End: 2021-03-17

## 2021-03-17 ENCOUNTER — TELEPHONE (OUTPATIENT)
Dept: ONCOLOGY | Facility: CLINIC | Age: 56
End: 2021-03-17

## 2021-03-17 NOTE — TELEPHONE ENCOUNTER
Hematology referral under dept review. BubbleGabhart message to pt to let her know.  Tiffani Villar, RN, BSN, OCN  Hematology/Oncology Nurse Navigator  [facility]  [phone]

## 2021-03-17 NOTE — TELEPHONE ENCOUNTER
Patient called in to schedule appointment with Dr. Mann only. Declined to schedule with another provider due to limited access for appointments within desired timeframe. She can be reached at [phone]. Message routed to team for consideration of appointment with dr. Mann per patient request.

## 2021-03-22 NOTE — TELEPHONE ENCOUNTER
Writer spoke with pt re: hematology referral reviewed by Dr Ruggiero who can see her 3/23 at 1pm. Pt voiced understanding of above instructions and information and denied further questions, will expect the call from our intake  to arrange the consult.   Tiffani Villar, RN, BSN, OCN  Hematology/Oncology Nurse Navigator  [facility]  [phone]

## 2021-03-23 ENCOUNTER — ONCOLOGY VISIT (OUTPATIENT)
Dept: TRANSPLANT | Facility: CLINIC | Age: 56
End: 2021-03-23
Attending: INTERNAL MEDICINE
Payer: COMMERCIAL

## 2021-03-23 VITALS
HEIGHT: 64 IN | SYSTOLIC BLOOD PRESSURE: 131 MMHG | HEART RATE: 74 BPM | RESPIRATION RATE: 18 BRPM | OXYGEN SATURATION: 98 % | WEIGHT: 146.7 LBS | BODY MASS INDEX: 25.04 KG/M2 | DIASTOLIC BLOOD PRESSURE: 78 MMHG

## 2021-03-23 DIAGNOSIS — D50.0 IRON DEFICIENCY ANEMIA DUE TO CHRONIC BLOOD LOSS: Primary | ICD-10-CM

## 2021-03-23 DIAGNOSIS — D47.2 MONOCLONAL GAMMOPATHY PRESENT ON SERUM PROTEIN ELECTROPHORESIS: ICD-10-CM

## 2021-03-23 LAB
ALBUMIN SERPL-MCNC: 4.2 G/DL (ref 3.4–5)
ALP SERPL-CCNC: 110 U/L (ref 40–150)
ALT SERPL W P-5'-P-CCNC: 35 U/L (ref 0–50)
ANION GAP SERPL CALCULATED.3IONS-SCNC: 3 MMOL/L (ref 3–14)
AST SERPL W P-5'-P-CCNC: 16 U/L (ref 0–45)
BASOPHILS # BLD AUTO: 0 10E9/L (ref 0–0.2)
BASOPHILS NFR BLD AUTO: 0.7 %
BILIRUB SERPL-MCNC: 0.5 MG/DL (ref 0.2–1.3)
BUN SERPL-MCNC: 14 MG/DL (ref 7–30)
CALCIUM SERPL-MCNC: 9.2 MG/DL (ref 8.5–10.1)
CHLORIDE SERPL-SCNC: 112 MMOL/L (ref 94–109)
CO2 SERPL-SCNC: 28 MMOL/L (ref 20–32)
CREAT SERPL-MCNC: 0.8 MG/DL (ref 0.52–1.04)
CRP SERPL-MCNC: <2.9 MG/L (ref 0–8)
DIFFERENTIAL METHOD BLD: NORMAL
EOSINOPHIL # BLD AUTO: 0.4 10E9/L (ref 0–0.7)
EOSINOPHIL NFR BLD AUTO: 6.6 %
ERYTHROCYTE [DISTWIDTH] IN BLOOD BY AUTOMATED COUNT: 12.5 % (ref 10–15)
ERYTHROCYTE [SEDIMENTATION RATE] IN BLOOD BY WESTERGREN METHOD: 12 MM/H (ref 0–30)
FERRITIN SERPL-MCNC: 32 NG/ML (ref 8–252)
GFR SERPL CREATININE-BSD FRML MDRD: 84 ML/MIN/{1.73_M2}
GLUCOSE SERPL-MCNC: 97 MG/DL (ref 70–99)
HCT VFR BLD AUTO: 41.7 % (ref 35–47)
HGB BLD-MCNC: 13.8 G/DL (ref 11.7–15.7)
IMM GRANULOCYTES # BLD: 0 10E9/L (ref 0–0.4)
IMM GRANULOCYTES NFR BLD: 0.3 %
IRON SATN MFR SERPL: 30 % (ref 15–46)
IRON SERPL-MCNC: 103 UG/DL (ref 35–180)
LYMPHOCYTES # BLD AUTO: 2.1 10E9/L (ref 0.8–5.3)
LYMPHOCYTES NFR BLD AUTO: 34.5 %
MCH RBC QN AUTO: 31.5 PG (ref 26.5–33)
MCHC RBC AUTO-ENTMCNC: 33.1 G/DL (ref 31.5–36.5)
MCV RBC AUTO: 95 FL (ref 78–100)
MONOCYTES # BLD AUTO: 0.4 10E9/L (ref 0–1.3)
MONOCYTES NFR BLD AUTO: 5.8 %
NEUTROPHILS # BLD AUTO: 3.1 10E9/L (ref 1.6–8.3)
NEUTROPHILS NFR BLD AUTO: 52.1 %
NRBC # BLD AUTO: 0 10*3/UL
NRBC BLD AUTO-RTO: 0 /100
PLATELET # BLD AUTO: 291 10E9/L (ref 150–450)
POTASSIUM SERPL-SCNC: 4 MMOL/L (ref 3.4–5.3)
PROT SERPL-MCNC: 8 G/DL (ref 6.8–8.8)
RBC # BLD AUTO: 4.38 10E12/L (ref 3.8–5.2)
SODIUM SERPL-SCNC: 142 MMOL/L (ref 133–144)
TIBC SERPL-MCNC: 337 UG/DL (ref 240–430)
WBC # BLD AUTO: 6 10E9/L (ref 4–11)

## 2021-03-23 PROCEDURE — 80053 COMPREHEN METABOLIC PANEL: CPT | Performed by: INTERNAL MEDICINE

## 2021-03-23 PROCEDURE — 99214 OFFICE O/P EST MOD 30 MIN: CPT | Performed by: INTERNAL MEDICINE

## 2021-03-23 PROCEDURE — 86140 C-REACTIVE PROTEIN: CPT | Performed by: INTERNAL MEDICINE

## 2021-03-23 PROCEDURE — 86160 COMPLEMENT ANTIGEN: CPT | Performed by: INTERNAL MEDICINE

## 2021-03-23 PROCEDURE — 86162 COMPLEMENT TOTAL (CH50): CPT | Performed by: INTERNAL MEDICINE

## 2021-03-23 PROCEDURE — G0463 HOSPITAL OUTPT CLINIC VISIT: HCPCS

## 2021-03-23 PROCEDURE — 82728 ASSAY OF FERRITIN: CPT | Performed by: INTERNAL MEDICINE

## 2021-03-23 PROCEDURE — 85652 RBC SED RATE AUTOMATED: CPT | Performed by: INTERNAL MEDICINE

## 2021-03-23 PROCEDURE — 83550 IRON BINDING TEST: CPT | Performed by: INTERNAL MEDICINE

## 2021-03-23 PROCEDURE — 36415 COLL VENOUS BLD VENIPUNCTURE: CPT

## 2021-03-23 PROCEDURE — 83516 IMMUNOASSAY NONANTIBODY: CPT | Mod: 59 | Performed by: INTERNAL MEDICINE

## 2021-03-23 PROCEDURE — 85025 COMPLETE CBC W/AUTO DIFF WBC: CPT | Performed by: INTERNAL MEDICINE

## 2021-03-23 PROCEDURE — 83540 ASSAY OF IRON: CPT | Performed by: INTERNAL MEDICINE

## 2021-03-23 ASSESSMENT — MIFFLIN-ST. JEOR: SCORE: 1243.18

## 2021-03-23 ASSESSMENT — PAIN SCALES - GENERAL: PAINLEVEL: NO PAIN (0)

## 2021-03-23 NOTE — NURSING NOTE
"Oncology Rooming Note    March 23, 2021 1:05 PM   Carolyn Briones is a 55 year old female who presents for:    Chief Complaint   Patient presents with     Oncology Clinic Visit     NEW; Monoclonal gammopathy present on serum protein electrophoresis      Initial Vitals: /78   Pulse 74   Resp 18   Ht 1.614 m (5' 3.54\")   Wt 66.5 kg (146 lb 11.2 oz)   LMP 10/04/2013   SpO2 98%   BMI 25.54 kg/m   Estimated body mass index is 25.54 kg/m  as calculated from the following:    Height as of this encounter: 1.614 m (5' 3.54\").    Weight as of this encounter: 66.5 kg (146 lb 11.2 oz). Body surface area is 1.73 meters squared.  No Pain (0) Comment: Data Unavailable   Patient's last menstrual period was 10/04/2013.  Allergies reviewed: Yes  Medications reviewed: Yes    Medications: Medication refills not needed today.  Pharmacy name entered into [facility]:    [facility]  #[id number] - [place], [place] - [address]  [facility] - [place], [place] - [address] [address] [place] [address], [id number]  [id number] - [place], [place] - [address]  [facility] - [place], [place] - [address]    Clinical concerns: New patient.       Tiffani Cullen MA            "

## 2021-03-23 NOTE — NURSING NOTE
Venipuncture blood draw done on patients Right AC. Patient tolerated well without any complications. 23G needle used. See flowsheets      Tiffani Cullen MA

## 2021-03-23 NOTE — LETTER
3/23/2021         RE: Carolyn Briones  [address]  [place] [address]        Dear Colleague,    Thank you for referring your patient, Carolyn Briones, to the [facility] [place]. Please see a copy of my visit note below.    HEMATOLOGY CLINIC VISIT       HISTORY OF PRESENT ILLNESS:  I had the pleasure of seeing Carolyn Briones in the Hematology Clinic for evaluation of a monoclonal gammopathy of uncertain significance.  I have known Carolyn for many years.  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  I last saw her in 01/2017 mainly for evaluation of iron deficiency.  She had had significant iron deficiency in part related to menses and  needed iron infusions.  More recently, she developed signs and symptoms of what sounded like a neuropathy.  She initially felt a tingling in the lower extremities and had an extensive workup at the [facility].  It really felt like a burning sensation in the soles of her feet, usually at night.  She also had a sensation in the shoulders, the back of the neck and anterior chest.  She states that last fall she developed shingles in the right upper back.  She was given Valtrex.  It was not clear that it was absolutely shingles.  She saw a dermatologist.  She was evaluated by Dr. Rodriguez for a cold sensation in her face, neck, arms and shoulders.  Workup included a serum protein electrophoresis, which showed , a small IgG lambda was found in her serum by  Immuno electrophoresis, but her M spike was negative.  She has not had a urine immunoelectrophoresis yet at this time.  IgM level was 60, IgG 873, IgA 193.  She has had extensive MRIs of the LS spine, neck and brain, and no lytic lesions have been noted.  She does not feel fatigued.  Her CBC had not shown anemia last fall.        PAST MEDICAL HISTORY, SOCIAL HISTORY AND FAMILY HISTORY:  See my note from 01/2017.      REVIEW OF SYSTEMS:  A 12 point review of systems  "was done and negative as in HPI.      PHYSICAL EXAMINATION: /78   Pulse 74   Resp 18   Ht 1.614 m (5' 3.54\")   Wt 66.5 kg (146 lb 11.2 oz)   LMP 10/04/2013   SpO2 98%   BMI 25.54 kg/m      GENERAL:  She is an alert woman, somewhat anxious, in no acute distress.   VITAL SIGNS:  Stable.   HEENT:  Normocephalic.  EOM okay.  No scleral icterus.  Mouth without lesion.   RESPIRATORY:  Clear to percussion and auscultation.   CARDIAC:  Normal sinus rhythm.  No S3, S4 or murmur.   ABDOMEN:  Soft without hepatosplenomegaly.   EXTREMITIES:  Without edema.   NEUROLOGIC:  Grossly normal.  Reflexes were 2+ equal and symmetric.  I did not find any paresthesias.    Results for MING HOWARD (MRN [id number]) as of 3/23/2021 17:13   Ref. Range 3/23/2021 13:51   Sodium Latest Ref Range: 133 - 144 mmol/L 142   Potassium Latest Ref Range: 3.4 - 5.3 mmol/L 4.0   Chloride Latest Ref Range: 94 - 109 mmol/L 112 (H)   Carbon Dioxide Latest Ref Range: 20 - 32 mmol/L 28   Urea Nitrogen Latest Ref Range: 7 - 30 mg/dL 14   Creatinine Latest Ref Range: 0.52 - 1.04 mg/dL 0.80   GFR Estimate Latest Ref Range: >60 mL/min/1.73_m2 84   GFR Estimate If Black Latest Ref Range: >60 mL/min/1.73_m2 >90   Calcium Latest Ref Range: 8.5 - 10.1 mg/dL 9.2   Anion Gap Latest Ref Range: 3 - 14 mmol/L 3   Albumin Latest Ref Range: 3.4 - 5.0 g/dL 4.2   Protein Total Latest Ref Range: 6.8 - 8.8 g/dL 8.0   Bilirubin Total Latest Ref Range: 0.2 - 1.3 mg/dL 0.5   Alkaline Phosphatase Latest Ref Range: 40 - 150 U/L 110   ALT Latest Ref Range: 0 - 50 U/L 35   AST Latest Ref Range: 0 - 45 U/L 16   CRP Inflammation Latest Ref Range: 0.0 - 8.0 mg/L <2.9   Ferritin Latest Ref Range: 8 - 252 ng/mL 32   Iron Latest Ref Range: 35 - 180 ug/dL 103   Iron Binding Cap Latest Ref Range: 240 - 430 ug/dL 337   Iron Saturation Index Latest Ref Range: 15 - 46 % 30   Glucose Latest Ref Range: 70 - 99 mg/dL 97   WBC Latest Ref Range: 4.0 - 11.0 10e9/L 6.0   Hemoglobin " Latest Ref Range: 11.7 - 15.7 g/dL 13.8   Hematocrit Latest Ref Range: 35.0 - 47.0 % 41.7   Platelet Count Latest Ref Range: 150 - 450 10e9/L 291   RBC Count Latest Ref Range: 3.8 - 5.2 10e12/L 4.38   MCV Latest Ref Range: 78 - 100 fl 95   MCH Latest Ref Range: 26.5 - 33.0 pg 31.5   MCHC Latest Ref Range: 31.5 - 36.5 g/dL 33.1   RDW Latest Ref Range: 10.0 - 15.0 % 12.5   Diff Method Unknown Automated Method   % Neutrophils Latest Units: % 52.1   % Lymphocytes Latest Units: % 34.5   % Monocytes Latest Units: % 5.8   % Eosinophils Latest Units: % 6.6   % Basophils Latest Units: % 0.7   % Immature Granulocytes Latest Units: % 0.3   Nucleated RBCs Latest Ref Range: 0 /100 0   Absolute Neutrophil Latest Ref Range: 1.6 - 8.3 10e9/L 3.1   Absolute Lymphocytes Latest Ref Range: 0.8 - 5.3 10e9/L 2.1   Absolute Monocytes Latest Ref Range: 0.0 - 1.3 10e9/L 0.4   Absolute Eosinophils Latest Ref Range: 0.0 - 0.7 10e9/L 0.4   Absolute Basophils Latest Ref Range: 0.0 - 0.2 10e9/L 0.0   Abs Immature Granulocytes Latest Ref Range: 0 - 0.4 10e9/L 0.0   Absolute Nucleated RBC Unknown 0.0   Sed Rate Latest Ref Range: 0 - 30 mm/h 12     ASSESSMENT:   1.  Paresthesias.   2.  Recent shingles.   3.  Monoclonal gammopathy of uncertain significance.   4.  History of iron deficiency anemia.   5.  History of hyperlipidemia.   6.  History of radiation exposure.   7.  Vitamin D deficiency.   8.  Thyroid nodules.   9.  Empty sella syndrome.     10.  Recent herpes zoster.        I am not really sure about the monoclonal gammopathy of uncertain significance. She has normal CBC Ca++ and kidney function, multiple radiology studies including spine were negative for lytic disease. It was only found on the immunoelectrophoresis.  I am wondering could this still be an immune reaction to her herpes zoster.  Could this be related in any way to her paresthesias, and does she have a neuropathy?.  EMG was normal reportedly by the patient done at [facility].  I  tried to reassure her about monoclonal gammopathy of uncertain significance.  I will plan to get a 24 hour urine immuno ELP and repeat immunoglobulin    levels.  I will also consider a skeletal survey.  Today I will get a CBC, iron, TIBC and complement levels.  I did give the patient information about monoclonal gammopathy of uncertain significance.  We may consider getting a bone densitometry done given her age and with the monoclonal gammopathy.  I will call her with these results.     I spent a total of 30 minutes face to face with Carolyn Briones during today's office visit. Over 50% of this time was spent counseling the patient and coordinating the care regarding monoclonal gammopathy of uncertain significance  Wilfredo Ruggiero MD  [phone]

## 2021-03-23 NOTE — PROGRESS NOTES
"HEMATOLOGY CLINIC VISIT       HISTORY OF PRESENT ILLNESS:  I had the pleasure of seeing Carolyn Briones in the Hematology Clinic for evaluation of a monoclonal gammopathy of uncertain significance.  I have known Carolyn for many years.  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  I last saw her in 01/2017 mainly for evaluation of iron deficiency.  She had had significant iron deficiency in part related to menses and  needed iron infusions.  More recently, she developed signs and symptoms of what sounded like a neuropathy.  She initially felt a tingling in the lower extremities and had an extensive workup at the [facility].  It really felt like a burning sensation in the soles of her feet, usually at night.  She also had a sensation in the shoulders, the back of the neck and anterior chest.  She states that last fall she developed shingles in the right upper back.  She was given Valtrex.  It was not clear that it was absolutely shingles.  She saw a dermatologist.  She was evaluated by Dr. Rodriguez for a cold sensation in her face, neck, arms and shoulders.  Workup included a serum protein electrophoresis, which showed , a small IgG lambda was found in her serum by  Immuno electrophoresis, but her M spike was negative.  She has not had a urine immunoelectrophoresis yet at this time.  IgM level was 60, IgG 873, IgA 193.  She has had extensive MRIs of the LS spine, neck and brain, and no lytic lesions have been noted.  She does not feel fatigued.  Her CBC had not shown anemia last fall.        PAST MEDICAL HISTORY, SOCIAL HISTORY AND FAMILY HISTORY:  See my note from 01/2017.      REVIEW OF SYSTEMS:  A 12 point review of systems was done and negative as in HPI.      PHYSICAL EXAMINATION: /78   Pulse 74   Resp 18   Ht 1.614 m (5' 3.54\")   Wt 66.5 kg (146 lb 11.2 oz)   LMP 10/04/2013   SpO2 98%   BMI 25.54 kg/m      GENERAL:  She is an alert woman, somewhat anxious, in no acute " distress.   VITAL SIGNS:  Stable.   HEENT:  Normocephalic.  EOM okay.  No scleral icterus.  Mouth without lesion.   RESPIRATORY:  Clear to percussion and auscultation.   CARDIAC:  Normal sinus rhythm.  No S3, S4 or murmur.   ABDOMEN:  Soft without hepatosplenomegaly.   EXTREMITIES:  Without edema.   NEUROLOGIC:  Grossly normal.  Reflexes were 2+ equal and symmetric.  I did not find any paresthesias.    Results for MING HOWARD (MRN [id number]) as of 3/23/2021 17:13   Ref. Range 3/23/2021 13:51   Sodium Latest Ref Range: 133 - 144 mmol/L 142   Potassium Latest Ref Range: 3.4 - 5.3 mmol/L 4.0   Chloride Latest Ref Range: 94 - 109 mmol/L 112 (H)   Carbon Dioxide Latest Ref Range: 20 - 32 mmol/L 28   Urea Nitrogen Latest Ref Range: 7 - 30 mg/dL 14   Creatinine Latest Ref Range: 0.52 - 1.04 mg/dL 0.80   GFR Estimate Latest Ref Range: >60 mL/min/1.73_m2 84   GFR Estimate If Black Latest Ref Range: >60 mL/min/1.73_m2 >90   Calcium Latest Ref Range: 8.5 - 10.1 mg/dL 9.2   Anion Gap Latest Ref Range: 3 - 14 mmol/L 3   Albumin Latest Ref Range: 3.4 - 5.0 g/dL 4.2   Protein Total Latest Ref Range: 6.8 - 8.8 g/dL 8.0   Bilirubin Total Latest Ref Range: 0.2 - 1.3 mg/dL 0.5   Alkaline Phosphatase Latest Ref Range: 40 - 150 U/L 110   ALT Latest Ref Range: 0 - 50 U/L 35   AST Latest Ref Range: 0 - 45 U/L 16   CRP Inflammation Latest Ref Range: 0.0 - 8.0 mg/L <2.9   Ferritin Latest Ref Range: 8 - 252 ng/mL 32   Iron Latest Ref Range: 35 - 180 ug/dL 103   Iron Binding Cap Latest Ref Range: 240 - 430 ug/dL 337   Iron Saturation Index Latest Ref Range: 15 - 46 % 30   Glucose Latest Ref Range: 70 - 99 mg/dL 97   WBC Latest Ref Range: 4.0 - 11.0 10e9/L 6.0   Hemoglobin Latest Ref Range: 11.7 - 15.7 g/dL 13.8   Hematocrit Latest Ref Range: 35.0 - 47.0 % 41.7   Platelet Count Latest Ref Range: 150 - 450 10e9/L 291   RBC Count Latest Ref Range: 3.8 - 5.2 10e12/L 4.38   MCV Latest Ref Range: 78 - 100 fl 95   MCH Latest Ref Range: 26.5 -  33.0 pg 31.5   MCHC Latest Ref Range: 31.5 - 36.5 g/dL 33.1   RDW Latest Ref Range: 10.0 - 15.0 % 12.5   Diff Method Unknown Automated Method   % Neutrophils Latest Units: % 52.1   % Lymphocytes Latest Units: % 34.5   % Monocytes Latest Units: % 5.8   % Eosinophils Latest Units: % 6.6   % Basophils Latest Units: % 0.7   % Immature Granulocytes Latest Units: % 0.3   Nucleated RBCs Latest Ref Range: 0 /100 0   Absolute Neutrophil Latest Ref Range: 1.6 - 8.3 10e9/L 3.1   Absolute Lymphocytes Latest Ref Range: 0.8 - 5.3 10e9/L 2.1   Absolute Monocytes Latest Ref Range: 0.0 - 1.3 10e9/L 0.4   Absolute Eosinophils Latest Ref Range: 0.0 - 0.7 10e9/L 0.4   Absolute Basophils Latest Ref Range: 0.0 - 0.2 10e9/L 0.0   Abs Immature Granulocytes Latest Ref Range: 0 - 0.4 10e9/L 0.0   Absolute Nucleated RBC Unknown 0.0   Sed Rate Latest Ref Range: 0 - 30 mm/h 12     ASSESSMENT:   1.  Paresthesias.   2.  Recent shingles.   3.  Monoclonal gammopathy of uncertain significance.   4.  History of iron deficiency anemia.   5.  History of hyperlipidemia.   6.  History of radiation exposure.   7.  Vitamin D deficiency.   8.  Thyroid nodules.   9.  Empty sella syndrome.     10.  Recent herpes zoster.        I am not really sure about the monoclonal gammopathy of uncertain significance. She has normal CBC Ca++ and kidney function, multiple radiology studies including spine were negative for lytic disease. It was only found on the immunoelectrophoresis.  I am wondering could this still be an immune reaction to her herpes zoster.  Could this be related in any way to her paresthesias, and does she have a neuropathy?.  EMG was normal reportedly by the patient done at [facility].  I tried to reassure her about monoclonal gammopathy of uncertain significance.  I will plan to get a 24 hour urine immuno ELP and repeat immunoglobulin    levels.  I will also consider a skeletal survey.  Today I will get a CBC, iron, TIBC and complement levels.  I did give  the patient information about monoclonal gammopathy of uncertain significance.  We may consider getting a bone densitometry done given her age and with the monoclonal gammopathy.  I will call her with these results.     I spent a total of 30 minutes face to face with Carolyn Briones during today's office visit. Over 50% of this time was spent counseling the patient and coordinating the care regarding monoclonal gammopathy of uncertain significance  Wilfredo Ruggiero MD  [phone]

## 2021-03-24 LAB
C3 SERPL-MCNC: 143 MG/DL (ref 81–157)
C4 SERPL-MCNC: 37 MG/DL (ref 13–39)

## 2021-03-25 LAB — CH50 SERPL-ACNC: 78 U/ML (ref 38.7–89.9)

## 2021-03-26 DIAGNOSIS — D47.2 MGUS (MONOCLONAL GAMMOPATHY OF UNKNOWN SIGNIFICANCE): Primary | ICD-10-CM

## 2021-03-26 LAB
MAG IGM SER IA-ACNC: 205 TU (ref 0–999)
SGPG IGM SER-ACNC: 0.57 IV (ref 0–0.99)

## 2021-04-09 ENCOUNTER — APPOINTMENT (OUTPATIENT)
Dept: LAB | Facility: CLINIC | Age: 56
End: 2021-04-09
Payer: COMMERCIAL

## 2021-04-09 ENCOUNTER — OFFICE VISIT (OUTPATIENT)
Dept: NEUROLOGY | Facility: CLINIC | Age: 56
End: 2021-04-09
Payer: COMMERCIAL

## 2021-04-09 VITALS
OXYGEN SATURATION: 98 % | DIASTOLIC BLOOD PRESSURE: 68 MMHG | RESPIRATION RATE: 16 BRPM | SYSTOLIC BLOOD PRESSURE: 106 MMHG | HEART RATE: 57 BPM

## 2021-04-09 DIAGNOSIS — D47.2 MGUS (MONOCLONAL GAMMOPATHY OF UNKNOWN SIGNIFICANCE): ICD-10-CM

## 2021-04-09 DIAGNOSIS — R20.8 DYSESTHESIA: Primary | ICD-10-CM

## 2021-04-09 DIAGNOSIS — R20.8 DYSESTHESIA: ICD-10-CM

## 2021-04-09 DIAGNOSIS — R53.83 OTHER FATIGUE: ICD-10-CM

## 2021-04-09 DIAGNOSIS — E04.1 THYROID NODULE: ICD-10-CM

## 2021-04-09 DIAGNOSIS — E23.6 EMPTY SELLA (H): ICD-10-CM

## 2021-04-09 LAB
FSH SERPL-ACNC: 103.7 IU/L
PROLACTIN SERPL-MCNC: 5 UG/L (ref 3–27)

## 2021-04-09 PROCEDURE — 84165 PROTEIN E-PHORESIS SERUM: CPT | Mod: 90 | Performed by: PATHOLOGY

## 2021-04-09 PROCEDURE — 84432 ASSAY OF THYROGLOBULIN: CPT | Mod: 90 | Performed by: PATHOLOGY

## 2021-04-09 PROCEDURE — 99215 OFFICE O/P EST HI 40 MIN: CPT | Performed by: INTERNAL MEDICINE

## 2021-04-09 PROCEDURE — 86769 SARS-COV-2 COVID-19 ANTIBODY: CPT | Performed by: PATHOLOGY

## 2021-04-09 PROCEDURE — 99N1036 PR STATISTIC TOTAL PROTEIN: Mod: 90 | Performed by: PATHOLOGY

## 2021-04-09 PROCEDURE — 83883 ASSAY NEPHELOMETRY NOT SPEC: CPT | Mod: 90 | Performed by: PATHOLOGY

## 2021-04-09 PROCEDURE — 83001 ASSAY OF GONADOTROPIN (FSH): CPT | Performed by: PATHOLOGY

## 2021-04-09 PROCEDURE — 99000 SPECIMEN HANDLING OFFICE-LAB: CPT | Performed by: PATHOLOGY

## 2021-04-09 PROCEDURE — 36415 COLL VENOUS BLD VENIPUNCTURE: CPT | Performed by: PATHOLOGY

## 2021-04-09 PROCEDURE — 86800 THYROGLOBULIN ANTIBODY: CPT | Mod: 90 | Performed by: PATHOLOGY

## 2021-04-09 PROCEDURE — 84146 ASSAY OF PROLACTIN: CPT | Mod: 90 | Performed by: PATHOLOGY

## 2021-04-09 NOTE — PROGRESS NOTES
[facility] Neurology Follow Up Visit    Carolyn Briones MRN# [id number]   Age: 55 year old YOB: 1965     Brief history of symptoms: The patient was initially seen in neurologic consultation on 3/12/2021 for evaluation of parasthesias. Please see the comprehensive neurologic consultation note from that date in the Epic records for details.     Interval history:   Patient seen by hematology at [facility] and [place] in the interim since last appointment. She had bone marrow biopsy and fat pad biopsy at [place] to look for evidence of amyloidosis. She had bone marrow biopsy and fat pad biopsy yesterday. These results are pending.     She has felt significant tingling in the left knee. This is intermittent.     She continues to feel burning in the soles of both feet in the soles and toes.     She has cooling and burning sensation and the neck and face.       Past Medical History:     Patient Active Problem List   Diagnosis     Anemia, iron deficiency     CARDIOVASCULAR SCREENING; LDL GOAL LESS THAN 160     Hand pain     Vitamin D deficiency     Female stress incontinence     Iron deficiency anemia due to chronic blood loss     Perimenopause     Stress at home     Thyroid nodule     Neck pain     Empty sella (H)     Other fatigue     Disturbance of skin sensation     Past Medical History:   Diagnosis Date     Anemia      Empty sella (H)     only partially empty; possibly normal vaiant     Exposure to other ionizing radiation, subsequent encounter 1986    [place]     GERD (gastroesophageal reflux disease)      Hypercholesterolemia      Hypertension      Iron deficiency      Multiple thyroid nodules     subcm        Past Surgical History:     Past Surgical History:   Procedure Laterality Date     APPENDECTOMY       COLONOSCOPY       DILATION AND CURETTAGE SUCTION  11/13/2012    Procedure: DILATION AND CURETTAGE SUCTION;  Suction Dilation & Curettage    (8 weeks);  Surgeon: Sherri Christiansen MD;  Location:  OR      EXCISE GANGLION WRIST  4/10/2014    Procedure: EXCISE GANGLION WRIST;  Excision Ganglion Cyst, Left Wrist;  Surgeon: Ashley Garcia MD;  [facility]     OPERATIVE HYSTEROSCOPY WITH MORCELLATOR  1/19/2012    Procedure:OPERATIVE HYSTEROSCOPY WITH MORCELLATOR; Hysteroscopy & Polypectomy With Morcellator; Surgeon:KEN ROSE; [facility] OR        Social History:     Social History     Tobacco Use     Smoking status: Never Smoker     Smokeless tobacco: Never Used   Substance Use Topics     Alcohol use: No     Drug use: No        Family History:     Family History   Problem Relation Age of Onset     Lipids Mother      Cancer Mother         desmoplastic mesothelioma     C.A.D. Father         CABG age 65     Thyroid Disease No family hx of      Thyroid Cancer No family hx of         Medications:     Current Outpatient Medications   Medication Sig     atorvastatin (LIPITOR) 20 MG tablet Take 20 mg by mouth daily      Iron-Vitamin C (IRON 100/C) 100-250 MG TABS infusion     LANsoprazole (PREVACID 24HR) 15 MG DR capsule 1 capsule before a meal     LANsoprazole (PREVACID) 30 MG DR capsule TAKE 1 CAPSULE BY MOUTH ONCE DAILYBEFORE A MEAL     LORazepam (ATIVAN) 0.5 MG tablet Take 0.5 mg by mouth as needed     Magnesium 400 MG CAPS as directed     metoprolol (TOPROL-XL) 25 MG 24 hr tablet      metoprolol succinate ER (TOPROL-XL) 25 MG 24 hr tablet TAKE ONE TABLET BY MOUTH ONE TIME DAILY     PREVACID 30 MG OR CPDR 1 CAPSULE DAILY BEFORE EATING     vitamin C (ASCORBIC ACID) 500 MG/5ML liquid 1 tablet     VITAMIN D PO      Zinc 25 MG TABS 1 tablet     No current facility-administered medications for this visit.         Allergies:     Allergies   Allergen Reactions     Iron Dextran Shortness Of Breath     Doxycycline Other (See Comments)     High blood pressure     Cipro [Ciprofloxacin] Nausea     Liquid Adhesive Itching     Sulphadimidine [Sulfa Drugs] Nausea     Adhesive Tape Rash        Review of Systems:   A  comprehensive 10 point review of systems (constitutional, ENT, cardiac, peripheral vascular, lymphatic, respiratory, GI, , Musculoskeletal, skin, Neurological) was performed and found to be negative except as described in this note.      Physical Exam:   Vitals: /68   Pulse 57   Resp 16   LMP 10/04/2013   SpO2 98%    General: Seated comfortably in no acute distress.  Lungs: breathing comfortably  Extremities: no edema  Skin: No rashes  Neurologic:     Mental Status: Fully alert, attentive and oriented. Normal memory and fund of knowledge. Language normal, speech clear and fluent, no paraphasic errors.     Cranial Nerves: Visual fields intact. EOMI with normal smooth pursuit. Facial sensation intact/symmetric. Facial movements symmetric. Hearing not formally tested but intact to conversation. Palate elevation symmetric, uvula midline. No dysarthria. Shoulder shrug strong bilaterally. Tongue protrusion midline.     Motor: No tremors or other abnormal movements observed. Muscle tone normal throughout. Normal/symmetric rapid finger tapping. Strength 5/5 throughout upper and lower extremities.     Deep Tendon Reflexes: 2+/symmetric throughout upper and lower extremities. Toes downgoing bilaterally.     Sensory: Vibration is 21-22 seconds in bilateral great toes, normal in the hands. Intact/symmetric to light touch, pinprick, temperature, vibration and proprioception throughout upper and lower extremities. Negative Romberg.      Coordination: Finger-nose-finger and heel-shin intact without dysmetria. Rapid alternating movements intact/symmetric with normal speed and rhythm.     Gait: Normal, steady casual gait. Able to walk on toes, heels and tandem without difficulty.     Data reviewed on previous visits    Imaging:  MRI lumbar spine 1/13/2021       MRI thoracic 1/7/2021       MRI cervical 1/7/2021       MRI orbits 1/7/2021       MRI brain 1/7/2021       Procedures:  EMG 3 weeks ago at [facility] reportedly  normal      Pertinent Investigations since last visit:   Labs 3/2021- unrevealing A1c, B12, B6, ELP, ESR, CRP, YULY, SSA/SSB, TTG Ab, TSH / Possible very small monoclonal IgG immunoglobulin of lambda light on immunofixation  chain type         Assessment and Plan:   Assessment:  Carolyn Briones is a 55 year old female who presents today for follow-up of abnormal sensations. She reports intermittent burning in the soles of the feet, cold/crawling sensation in the thighs, and coldness sensation in the face, neck, shoulders. Neurological exam is normal today. Recent MRI brain, cervical, thoracic, and lumbar scan were non revealing as to cause. She reportedly had a normal EMG at [facility]. She is going to fax over the results. Neuropathy work-up for causes of small fiber neuropathy was largely unremarkable. Small monoclonal IgG immunoglobulin of lambda light chain was seen on immunofixation. I have a lower suspicion of this causing symptoms. Patient recently had a bone marrow biopsy and fat pad biopsy done at [place] to evaluate for amyloidosis. Results are pending. We discussed utility of doing skin biopsy to look for small fiber neuropathy. Given that symptoms are intermittent and patient has a normal neurological exam I have a lower suspicion of this test showing small fiber neuropathy. She is going to consider pursuing this pending results of bone marrow and fat pad biopsy.      Plan:  - Fax us recent EMG report  - Follow-up bone marrow biopsy and fat pad biopsy  - Consider small fiber biopsy    Follow up in Neurology clinic pending above    Trenton Rodriguez MD   of Neurology  [facility]    The total time of this encounter amounted to 48 minutes. This time included time spent with the patient, prep work, ordering tests, and performing post visit documentation.

## 2021-04-09 NOTE — LETTER
4/9/2021       RE: Carolyn Briones  [id number]  [place] [address]     Dear Colleague,    Thank you for referring your patient, Carolyn Briones, to the [facility] [place] at [facility]. Please see a copy of my visit note below.    [facility] Neurology Follow Up Visit    Carolyn Briones MRN# [id number]   Age: 55 year old YOB: 1965     Brief history of symptoms: The patient was initially seen in neurologic consultation on 3/12/2021 for evaluation of parasthesias. Please see the comprehensive neurologic consultation note from that date in the Epic records for details.     Interval history:   Patient seen by hematology at [facility] and [place] in the interim since last appointment. She had bone marrow biopsy and fat pad biopsy at [place] to look for evidence of amyloidosis. She had bone marrow biopsy and fat pad biopsy yesterday. These results are pending.     She has felt significant tingling in the left knee. This is intermittent.     She continues to feel burning in the soles of both feet in the soles and toes.     She has cooling and burning sensation and the neck and face.       Past Medical History:     Patient Active Problem List   Diagnosis     Anemia, iron deficiency     CARDIOVASCULAR SCREENING; LDL GOAL LESS THAN 160     Hand pain     Vitamin D deficiency     Female stress incontinence     Iron deficiency anemia due to chronic blood loss     Perimenopause     Stress at home     Thyroid nodule     Neck pain     Empty sella (H)     Other fatigue     Disturbance of skin sensation     Past Medical History:   Diagnosis Date     Anemia      Empty sella (H)     only partially empty; possibly normal vaiant     Exposure to other ionizing radiation, subsequent encounter 1986    [place]     GERD (gastroesophageal reflux disease)      Hypercholesterolemia      Hypertension      Iron deficiency      Multiple thyroid nodules     subcm        Past  Surgical History:     Past Surgical History:   Procedure Laterality Date     APPENDECTOMY       COLONOSCOPY       DILATION AND CURETTAGE SUCTION  11/13/2012    Procedure: DILATION AND CURETTAGE SUCTION;  Suction Dilation & Curettage    (8 weeks);  Surgeon: Ken Rose MD;  [facility]     EXCISE GANGLION WRIST  4/10/2014    Procedure: EXCISE GANGLION WRIST;  Excision Ganglion Cyst, Left Wrist;  Surgeon: Ashley Garcia MD;  [facility]     OPERATIVE HYSTEROSCOPY WITH MORCELLATOR  1/19/2012    Procedure:OPERATIVE HYSTEROSCOPY WITH MORCELLATOR; Hysteroscopy & Polypectomy With Morcellator; Surgeon:KEN ROSE; [facility] OR        Social History:     Social History     Tobacco Use     Smoking status: Never Smoker     Smokeless tobacco: Never Used   Substance Use Topics     Alcohol use: No     Drug use: No        Family History:     Family History   Problem Relation Age of Onset     Lipids Mother      Cancer Mother         desmoplastic mesothelioma     C.A.D. Father         CABG age 65     Thyroid Disease No family hx of      Thyroid Cancer No family hx of         Medications:     Current Outpatient Medications   Medication Sig     atorvastatin (LIPITOR) 20 MG tablet Take 20 mg by mouth daily      Iron-Vitamin C (IRON 100/C) 100-250 MG TABS infusion     LANsoprazole (PREVACID 24HR) 15 MG DR capsule 1 capsule before a meal     LANsoprazole (PREVACID) 30 MG DR capsule TAKE 1 CAPSULE BY MOUTH ONCE DAILYBEFORE A MEAL     LORazepam (ATIVAN) 0.5 MG tablet Take 0.5 mg by mouth as needed     Magnesium 400 MG CAPS as directed     metoprolol (TOPROL-XL) 25 MG 24 hr tablet      metoprolol succinate ER (TOPROL-XL) 25 MG 24 hr tablet TAKE ONE TABLET BY MOUTH ONE TIME DAILY     PREVACID 30 MG OR CPDR 1 CAPSULE DAILY BEFORE EATING     vitamin C (ASCORBIC ACID) 500 MG/5ML liquid 1 tablet     VITAMIN D PO      Zinc 25 MG TABS 1 tablet     No current facility-administered medications for this visit.         Allergies:      Allergies   Allergen Reactions     Iron Dextran Shortness Of Breath     Doxycycline Other (See Comments)     High blood pressure     Cipro [Ciprofloxacin] Nausea     Liquid Adhesive Itching     Sulphadimidine [Sulfa Drugs] Nausea     Adhesive Tape Rash        Review of Systems:   A comprehensive 10 point review of systems (constitutional, ENT, cardiac, peripheral vascular, lymphatic, respiratory, GI, , Musculoskeletal, skin, Neurological) was performed and found to be negative except as described in this note.      Physical Exam:   Vitals: /68   Pulse 57   Resp 16   LMP 10/04/2013   SpO2 98%    General: Seated comfortably in no acute distress.  Lungs: breathing comfortably  Extremities: no edema  Skin: No rashes  Neurologic:     Mental Status: Fully alert, attentive and oriented. Normal memory and fund of knowledge. Language normal, speech clear and fluent, no paraphasic errors.     Cranial Nerves: Visual fields intact. EOMI with normal smooth pursuit. Facial sensation intact/symmetric. Facial movements symmetric. Hearing not formally tested but intact to conversation. Palate elevation symmetric, uvula midline. No dysarthria. Shoulder shrug strong bilaterally. Tongue protrusion midline.     Motor: No tremors or other abnormal movements observed. Muscle tone normal throughout. Normal/symmetric rapid finger tapping. Strength 5/5 throughout upper and lower extremities.     Deep Tendon Reflexes: 2+/symmetric throughout upper and lower extremities. Toes downgoing bilaterally.     Sensory: Vibration is 21-22 seconds in bilateral great toes, normal in the hands. Intact/symmetric to light touch, pinprick, temperature, vibration and proprioception throughout upper and lower extremities. Negative Romberg.      Coordination: Finger-nose-finger and heel-shin intact without dysmetria. Rapid alternating movements intact/symmetric with normal speed and rhythm.     Gait: Normal, steady casual gait. Able to walk on  toes, heels and tandem without difficulty.     Data reviewed on previous visits    Imaging:  MRI lumbar spine 1/13/2021       MRI thoracic 1/7/2021       MRI cervical 1/7/2021       MRI orbits 1/7/2021       MRI brain 1/7/2021       Procedures:  EMG 3 weeks ago at [facility] reportedly normal      Pertinent Investigations since last visit:   Labs 3/2021- unrevealing A1c, B12, B6, ELP, ESR, CRP, YULY, SSA/SSB, TTG Ab, TSH / Possible very small monoclonal IgG immunoglobulin of lambda light on immunofixation  chain type         Assessment and Plan:   Assessment:  Carolyn Briones is a 55 year old female who presents today for follow-up of abnormal sensations. She reports intermittent burning in the soles of the feet, cold/crawling sensation in the thighs, and coldness sensation in the face, neck, shoulders. Neurological exam is normal today. Recent MRI brain, cervical, thoracic, and lumbar scan were non revealing as to cause. She reportedly had a normal EMG at [facility]. She is going to fax over the results. Neuropathy work-up for causes of small fiber neuropathy was largely unremarkable. Small monoclonal IgG immunoglobulin of lambda light chain was seen on immunofixation. I have a lower suspicion of this causing symptoms. Patient recently had a bone marrow biopsy and fat pad biopsy done at [place] to evaluate for amyloidosis. Results are pending. We discussed utility of doing skin biopsy to look for small fiber neuropathy. Given that symptoms are intermittent and patient has a normal neurological exam I have a lower suspicion of this test showing small fiber neuropathy. She is going to consider pursuing this pending results of bone marrow and fat pad biopsy.      Plan:  - Fax us recent EMG report  - Follow-up bone marrow biopsy and fat pad biopsy  - Consider small fiber biopsy    Follow up in Neurology clinic pending above    Trenton Rodriguez MD   of Neurology  [facility]    The total time of this  encounter amounted to 48 minutes. This time included time spent with the patient, prep work, ordering tests, and performing post visit documentation.

## 2021-04-09 NOTE — NURSING NOTE
Chief Complaint   Patient presents with     RECHECK     [facility] RETURN 1 MO F/U        Steven Moses, EMT

## 2021-04-10 LAB
SARS-COV-2 AB PNL SERPL IA: NEGATIVE
SARS-COV-2 IGG SERPL IA-ACNC: NORMAL

## 2021-04-12 LAB
ALBUMIN SERPL ELPH-MCNC: 4.7 G/DL (ref 3.7–5.1)
ALPHA1 GLOB SERPL ELPH-MCNC: 0.3 G/DL (ref 0.2–0.4)
ALPHA2 GLOB SERPL ELPH-MCNC: 0.9 G/DL (ref 0.5–0.9)
B-GLOBULIN SERPL ELPH-MCNC: 0.8 G/DL (ref 0.6–1)
GAMMA GLOB SERPL ELPH-MCNC: 0.9 G/DL (ref 0.7–1.6)
KAPPA LC UR-MCNC: 1.49 MG/DL (ref 0.33–1.94)
KAPPA LC/LAMBDA SER: 1.34 {RATIO} (ref 0.26–1.65)
LAMBDA LC SERPL-MCNC: 1.11 MG/DL (ref 0.57–2.63)
M PROTEIN SERPL ELPH-MCNC: 0 G/DL
PROT PATTERN SERPL ELPH-IMP: NORMAL

## 2021-04-13 DIAGNOSIS — E04.1 THYROID NODULE: Primary | ICD-10-CM

## 2021-04-19 LAB — LAB SCANNED RESULT: NORMAL

## 2021-04-22 ENCOUNTER — ANCILLARY PROCEDURE (OUTPATIENT)
Dept: ULTRASOUND IMAGING | Facility: CLINIC | Age: 56
End: 2021-04-22
Payer: COMMERCIAL

## 2021-04-22 DIAGNOSIS — E04.1 THYROID NODULE: ICD-10-CM

## 2021-04-22 PROCEDURE — 76536 US EXAM OF HEAD AND NECK: CPT

## 2021-04-28 DIAGNOSIS — R53.83 OTHER FATIGUE: ICD-10-CM

## 2021-04-28 DIAGNOSIS — E04.1 THYROID NODULE: ICD-10-CM

## 2021-04-28 DIAGNOSIS — E23.6 EMPTY SELLA (H): ICD-10-CM

## 2021-04-28 LAB — CORTIS SERPL-MCNC: 22.6 UG/DL (ref 4–22)

## 2021-04-28 PROCEDURE — 36415 COLL VENOUS BLD VENIPUNCTURE: CPT

## 2021-04-28 PROCEDURE — 82533 TOTAL CORTISOL: CPT

## 2021-05-14 DIAGNOSIS — R79.89 HIGH SERUM CORTISOL: Primary | ICD-10-CM

## 2021-05-25 ENCOUNTER — TELEPHONE (OUTPATIENT)
Dept: ENDOCRINOLOGY | Facility: CLINIC | Age: 56
End: 2021-05-25

## 2021-05-25 NOTE — TELEPHONE ENCOUNTER
LVM for pt to c/b to reschedule 7/12 appt - provider out - Ok to schedule into any of the held spots on 7/20, 7/26, or 7/27. Call clinic with scheduling questions.

## 2021-07-09 DIAGNOSIS — R79.89 HIGH SERUM CORTISOL: ICD-10-CM

## 2021-07-09 LAB — CORTIS SERPL-MCNC: 21.3 UG/DL (ref 4–22)

## 2021-07-09 PROCEDURE — 36415 COLL VENOUS BLD VENIPUNCTURE: CPT

## 2021-07-09 PROCEDURE — 82533 TOTAL CORTISOL: CPT

## 2021-07-13 ENCOUNTER — LAB (OUTPATIENT)
Dept: LAB | Facility: CLINIC | Age: 56
End: 2021-07-13
Payer: COMMERCIAL

## 2021-07-13 DIAGNOSIS — R79.89 HIGH SERUM CORTISOL: ICD-10-CM

## 2021-07-13 LAB
COLLECT DURATION TIME UR: 30 H
CREAT 24H UR-MRATE: 0.96 G/SPEC (ref 0.8–1.8)
CREAT UR-MCNC: 50 MG/DL
SPECIMEN VOL UR: 2410 ML

## 2021-07-13 PROCEDURE — 82570 ASSAY OF URINE CREATININE: CPT | Performed by: PATHOLOGY

## 2021-07-13 PROCEDURE — 82530 CORTISOL FREE: CPT | Mod: 90 | Performed by: PATHOLOGY

## 2021-07-13 PROCEDURE — 81050 URINALYSIS VOLUME MEASURE: CPT | Performed by: PATHOLOGY

## 2021-07-17 LAB
ANNOTATION COMMENT IMP: NORMAL
CORTIS F 24H UR HPLC-MCNC: 9.18 UG/L
CORTIS F 24H UR-MRATE: 22.1 UG/D
CORTIS F/CREAT 24H UR: 16.69 UG/G CRT
CREAT 24H UR-MRATE: 1326 MG/D
CREAT UR-MCNC: 55 MG/DL

## 2021-07-26 ENCOUNTER — VIRTUAL VISIT (OUTPATIENT)
Dept: ENDOCRINOLOGY | Facility: CLINIC | Age: 56
End: 2021-07-26
Payer: COMMERCIAL

## 2021-07-26 DIAGNOSIS — E65 BUFFALO HUMP: ICD-10-CM

## 2021-07-26 DIAGNOSIS — E04.1 THYROID NODULE: ICD-10-CM

## 2021-07-26 DIAGNOSIS — E23.6 EMPTY SELLA (H): ICD-10-CM

## 2021-07-26 DIAGNOSIS — R79.89 HIGH SERUM CORTISOL: Primary | ICD-10-CM

## 2021-07-26 PROCEDURE — 99215 OFFICE O/P EST HI 40 MIN: CPT | Mod: 95

## 2021-07-26 NOTE — PROGRESS NOTES
Carolyn Briones  is being evaluated via a billable video visit.      How would you like to obtain your AVS? [employer]  For the video visit, send the invitation by: Text to cell phone: [phone]  Will anyone else be joining your video visit? No
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
## 2021-07-26 NOTE — PATIENT INSTRUCTIONS
Labs after 4 PM   Lets do midnight salivary cortisol     Here are the post cortisol levels, FYI     Ref. Range 11/11/2008 12:33 11/17/2009 16:38 4/26/2012 08:29 10/27/2016 10:13 4/28/2021 08:55 7/9/2021 08:51   Cortisol Serum Latest Ref Range: 4 - 22 ug/dL 7.2 6.6 11.1 11.0 22.6 (H) 21.3

## 2021-07-30 ENCOUNTER — ANCILLARY PROCEDURE (OUTPATIENT)
Dept: MAMMOGRAPHY | Facility: CLINIC | Age: 56
End: 2021-07-30
Attending: INTERNAL MEDICINE
Payer: COMMERCIAL

## 2021-07-30 DIAGNOSIS — Z12.31 VISIT FOR SCREENING MAMMOGRAM: ICD-10-CM

## 2021-07-30 PROCEDURE — 77067 SCR MAMMO BI INCL CAD: CPT | Mod: GC | Performed by: RADIOLOGY

## 2021-07-30 PROCEDURE — 77063 BREAST TOMOSYNTHESIS BI: CPT | Mod: GC | Performed by: RADIOLOGY

## 2021-08-05 ENCOUNTER — LAB (OUTPATIENT)
Dept: LAB | Facility: CLINIC | Age: 56
End: 2021-08-05
Payer: COMMERCIAL

## 2021-08-05 DIAGNOSIS — R79.89 HIGH SERUM CORTISOL: ICD-10-CM

## 2021-08-05 LAB — CORTIS SERPL-MCNC: 5.4 UG/DL (ref 4–22)

## 2021-08-05 PROCEDURE — 82533 TOTAL CORTISOL: CPT | Mod: 90 | Performed by: PATHOLOGY

## 2021-08-05 PROCEDURE — 36415 COLL VENOUS BLD VENIPUNCTURE: CPT | Performed by: PATHOLOGY

## 2021-08-05 PROCEDURE — 82024 ASSAY OF ACTH: CPT | Mod: 90 | Performed by: PATHOLOGY

## 2021-08-09 LAB — ACTH PLAS-MCNC: <10 PG/ML

## 2021-09-03 ENCOUNTER — LAB (OUTPATIENT)
Dept: LAB | Facility: CLINIC | Age: 56
End: 2021-09-03
Payer: COMMERCIAL

## 2021-09-03 ENCOUNTER — OFFICE VISIT (OUTPATIENT)
Dept: NEUROLOGY | Facility: CLINIC | Age: 56
End: 2021-09-03
Payer: COMMERCIAL

## 2021-09-03 VITALS — SYSTOLIC BLOOD PRESSURE: 124 MMHG | DIASTOLIC BLOOD PRESSURE: 75 MMHG

## 2021-09-03 DIAGNOSIS — R42 DIZZINESS: Primary | ICD-10-CM

## 2021-09-03 DIAGNOSIS — R42 DIZZINESS: ICD-10-CM

## 2021-09-03 LAB
ALBUMIN SERPL-MCNC: 4 G/DL (ref 3.4–5)
ALP SERPL-CCNC: 98 U/L (ref 40–150)
ALT SERPL W P-5'-P-CCNC: 28 U/L (ref 0–50)
ANION GAP SERPL CALCULATED.3IONS-SCNC: 5 MMOL/L (ref 3–14)
AST SERPL W P-5'-P-CCNC: 16 U/L (ref 0–45)
BASOPHILS # BLD AUTO: 0 10E3/UL (ref 0–0.2)
BASOPHILS NFR BLD AUTO: 1 %
BILIRUB SERPL-MCNC: 0.6 MG/DL (ref 0.2–1.3)
BUN SERPL-MCNC: 13 MG/DL (ref 7–30)
CALCIUM SERPL-MCNC: 9.3 MG/DL (ref 8.5–10.1)
CHLORIDE BLD-SCNC: 109 MMOL/L (ref 94–109)
CO2 SERPL-SCNC: 28 MMOL/L (ref 20–32)
CREAT SERPL-MCNC: 0.75 MG/DL (ref 0.52–1.04)
EOSINOPHIL # BLD AUTO: 0.4 10E3/UL (ref 0–0.7)
EOSINOPHIL NFR BLD AUTO: 7 %
ERYTHROCYTE [DISTWIDTH] IN BLOOD BY AUTOMATED COUNT: 12.8 % (ref 10–15)
GFR SERPL CREATININE-BSD FRML MDRD: >90 ML/MIN/1.73M2
GLUCOSE BLD-MCNC: 106 MG/DL (ref 70–99)
HCT VFR BLD AUTO: 39.8 % (ref 35–47)
HGB BLD-MCNC: 13.5 G/DL (ref 11.7–15.7)
IMM GRANULOCYTES # BLD: 0 10E3/UL
IMM GRANULOCYTES NFR BLD: 0 %
LYMPHOCYTES # BLD AUTO: 2 10E3/UL (ref 0.8–5.3)
LYMPHOCYTES NFR BLD AUTO: 34 %
MCH RBC QN AUTO: 31.5 PG (ref 26.5–33)
MCHC RBC AUTO-ENTMCNC: 33.9 G/DL (ref 31.5–36.5)
MCV RBC AUTO: 93 FL (ref 78–100)
MONOCYTES # BLD AUTO: 0.4 10E3/UL (ref 0–1.3)
MONOCYTES NFR BLD AUTO: 6 %
NEUTROPHILS # BLD AUTO: 3.1 10E3/UL (ref 1.6–8.3)
NEUTROPHILS NFR BLD AUTO: 52 %
NRBC # BLD AUTO: 0 10E3/UL
NRBC BLD AUTO-RTO: 0 /100
PLATELET # BLD AUTO: 304 10E3/UL (ref 150–450)
POTASSIUM BLD-SCNC: 4.1 MMOL/L (ref 3.4–5.3)
PROT SERPL-MCNC: 7.6 G/DL (ref 6.8–8.8)
RBC # BLD AUTO: 4.28 10E6/UL (ref 3.8–5.2)
SODIUM SERPL-SCNC: 142 MMOL/L (ref 133–144)
WBC # BLD AUTO: 5.9 10E3/UL (ref 4–11)

## 2021-09-03 PROCEDURE — 80053 COMPREHEN METABOLIC PANEL: CPT | Performed by: PATHOLOGY

## 2021-09-03 PROCEDURE — 36415 COLL VENOUS BLD VENIPUNCTURE: CPT | Performed by: PATHOLOGY

## 2021-09-03 PROCEDURE — 99214 OFFICE O/P EST MOD 30 MIN: CPT | Performed by: INTERNAL MEDICINE

## 2021-09-03 PROCEDURE — 85025 COMPLETE CBC W/AUTO DIFF WBC: CPT | Performed by: PATHOLOGY

## 2021-09-03 NOTE — LETTER
9/3/2021       RE: Carolyn Briones  [address]  [place] [address]     Dear Colleague,    Thank you for referring your patient, Carolyn Briones, to the [facility] NEUROLOGY CLINIC [place] at [facility]. Please see a copy of my visit note below.    [facility] Neurology Follow Up Visit    Carolyn Briones MRN# [id number]   Age: 55 year old YOB: 1965     Brief history of symptoms: The patient was initially seen in neurologic consultation on 3/12/2021 for evaluation of parasthesias. Please see the comprehensive neurologic consultation note from that date in the Epic records for details.     Interval history:   She continues to have the same sensations as last visit. They are not bothersome enough to start a medication. She was seen at [place] for second opinion.    She has been having dizziness which is new. She woke up on Saturday and felt kind of dizzy. She reports a mild spinning/swaying sensation and light headed sensation. This improved after a few hours. The following day she woke up with the same symptoms. This has also occurred two more days this week including today.     She has had minor sinus pressure in the front of her head when she coughs. Other than this she has not had significant headaches.       Past Medical History:     Patient Active Problem List   Diagnosis     Anemia, iron deficiency     CARDIOVASCULAR SCREENING; LDL GOAL LESS THAN 160     Hand pain     Vitamin D deficiency     Female stress incontinence     Iron deficiency anemia due to chronic blood loss     Perimenopause     Stress at home     Thyroid nodule     Neck pain     Empty sella (H)     Other fatigue     Disturbance of skin sensation     Canyon hump     High serum cortisol     Past Medical History:   Diagnosis Date     Anemia      Empty sella (H)     only partially empty; possibly normal vaiant     Exposure to other ionizing radiation, subsequent encounter 1986    [place]      GERD (gastroesophageal reflux disease)      Hypercholesterolemia      Hypertension      Iron deficiency      Multiple thyroid nodules     subcm        Past Surgical History:     Past Surgical History:   Procedure Laterality Date     APPENDECTOMY       COLONOSCOPY       DILATION AND CURETTAGE SUCTION  11/13/2012    Procedure: DILATION AND CURETTAGE SUCTION;  Suction Dilation & Curettage    (8 weeks);  Surgeon: Ken Rose MD;  [facility]     EXCISE GANGLION WRIST  4/10/2014    Procedure: EXCISE GANGLION WRIST;  Excision Ganglion Cyst, Left Wrist;  Surgeon: Ashley Garcia MD;  [facility]     OPERATIVE HYSTEROSCOPY WITH MORCELLATOR  1/19/2012    Procedure:OPERATIVE HYSTEROSCOPY WITH MORCELLATOR; Hysteroscopy & Polypectomy With Morcellator; Surgeon:KEN ROSE; [facility] OR        Social History:     Social History     Tobacco Use     Smoking status: Never Smoker     Smokeless tobacco: Never Used   Substance Use Topics     Alcohol use: No     Drug use: No        Family History:     Family History   Problem Relation Age of Onset     Lipids Mother      Cancer Mother         desmoplastic mesothelioma     C.A.D. Father         CABG age 65     Thyroid Disease No family hx of      Thyroid Cancer No family hx of         Medications:     Current Outpatient Medications   Medication Sig     atorvastatin (LIPITOR) 20 MG tablet Take 20 mg by mouth daily      Iron-Vitamin C (IRON 100/C) 100-250 MG TABS infusion     LANsoprazole (PREVACID 24HR) 15 MG DR capsule 1 capsule before a meal     LANsoprazole (PREVACID) 30 MG DR capsule TAKE 1 CAPSULE BY MOUTH ONCE DAILYBEFORE A MEAL     LORazepam (ATIVAN) 0.5 MG tablet Take 0.5 mg by mouth as needed     Magnesium 400 MG CAPS as directed     metoprolol (TOPROL-XL) 25 MG 24 hr tablet      metoprolol succinate ER (TOPROL-XL) 25 MG 24 hr tablet TAKE ONE TABLET BY MOUTH ONE TIME DAILY     PREVACID 30 MG OR CPDR 1 CAPSULE DAILY BEFORE EATING     vitamin C (ASCORBIC  ACID) 500 MG/5ML liquid 1 tablet     VITAMIN D PO      Zinc 25 MG TABS 1 tablet     No current facility-administered medications for this visit.        Allergies:     Allergies   Allergen Reactions     Iron Dextran Shortness Of Breath     Doxycycline Other (See Comments)     High blood pressure     Cipro [Ciprofloxacin] Nausea     Liquid Adhesive Itching     Sulphadimidine [Sulfa Drugs] Nausea     Adhesive Tape Rash        Review of Systems:   As above     Physical Exam:   Vitals: /75   LMP 10/04/2013    General: Seated comfortably in no acute distress.  Lungs: breathing comfortably  Extremities: no edema  Skin: No rashes  Neurologic:     Mental Status: Fully alert, attentive and oriented. Normal memory and fund of knowledge. Language normal, speech clear and fluent, no paraphasic errors.     Cranial Nerves: Visual fields intact. EOMI with normal smooth pursuit. Facial sensation intact/symmetric. Facial movements symmetric. Hearing not formally tested but intact to conversation. Palate elevation symmetric, uvula midline. No dysarthria. Shoulder shrug strong bilaterally. Tongue protrusion midline.     Motor: No tremors or other abnormal movements observed. Muscle tone normal throughout. Normal/symmetric rapid finger tapping. Strength 5/5 throughout upper and lower extremities.     Deep Tendon Reflexes: 2+/symmetric throughout upper and lower extremities.      Sensory: Intact/symmetric to light touch throughout upper and lower extremities. Negative Romberg.      Coordination: Finger-nose-finger and heel-shin intact without dysmetria. Rapid alternating movements intact/symmetric with normal speed and rhythm.     Gait: Normal, steady casual gait. Able to walk on toes, heels and tandem without difficulty.     Data reviewed on previous visits    Imaging:  MRI lumbar spine 1/13/2021       MRI thoracic 1/7/2021       MRI cervical 1/7/2021       MRI orbits 1/7/2021       MRI brain 1/7/2021       Procedures:  EMG 3  weeks ago at [facility] reportedly normal      Pertinent Investigations since last visit:   Labs 3/2021- unrevealing A1c, B12, B6, ELP, ESR, CRP, YULY, SSA/SSB, TTG Ab, TSH / Possible very small monoclonal IgG immunoglobulin of lambda light on immunofixation  chain type         Assessment and Plan:   Assessment:  Carolyn Briones is a 55 year old female who presents today for follow-up of abnormal sensations and new symptoms of dizziness. She reports intermittent burning in the soles of the feet, cold/crawling sensation in the thighs, and coldness sensation in the face, neck, shoulders. Prior MRI brain, cervical, thoracic, and lumbar scan were non revealing as to cause. She also had a normal EMG at [facility]. Neuropathy work-up for causes of small fiber neuropathy was largely unremarkable. Small monoclonal IgG immunoglobulin of lambda light chain was seen on immunofixation and has been given diagnosis of MGUS by hematology. I have a low suspicion of monoclonal protein being related to symptoms. Given normal examination I have a low suspicion for small fiber neuropathy and small fiber biopsy is likely low yield.     Dizziness is new over the last week. She has light headed, swaying sensation she has woken up with in the morning, and it improves throughout the same. Dizziness is not specific to a clear etiology and neurological exam is reassuring today. Blood pressure is normal today. We will do routine blood work today checking for metabolic causes of dizziness. Additional testing can be considered if dizziness does not improve. She has mild sinus pressure and should reach out to primary if she develops worsening sinus symptoms.     Plan:  - CBC, CMP today  - Monitor symptoms and call if worsening    Follow up in Neurology clinic pending above    Trenton Rodriguez MD   of Neurology  [facility]    The total time of this encounter amounted to 34 minutes. This time included time spent with the patient,  prep work, ordering tests, and performing post visit documentation.        Again, thank you for allowing me to participate in the care of your patient.      Sincerely,    Trenton Rodriguez MD

## 2021-09-03 NOTE — PROGRESS NOTES
[facility] Neurology Follow Up Visit    Carolyn Briones MRN# [id number]   Age: 55 year old YOB: 1965     Brief history of symptoms: The patient was initially seen in neurologic consultation on 3/12/2021 for evaluation of parasthesias. Please see the comprehensive neurologic consultation note from that date in the Epic records for details.     Interval history:   She continues to have the same sensations as last visit. They are not bothersome enough to start a medication. She was seen at [place] for second opinion.    She has been having dizziness which is new. She woke up on Saturday and felt kind of dizzy. She reports a mild spinning/swaying sensation and light headed sensation. This improved after a few hours. The following day she woke up with the same symptoms. This has also occurred two more days this week including today.     She has had minor sinus pressure in the front of her head when she coughs. Other than this she has not had significant headaches.       Past Medical History:     Patient Active Problem List   Diagnosis     Anemia, iron deficiency     CARDIOVASCULAR SCREENING; LDL GOAL LESS THAN 160     Hand pain     Vitamin D deficiency     Female stress incontinence     Iron deficiency anemia due to chronic blood loss     Perimenopause     Stress at home     Thyroid nodule     Neck pain     Empty sella (H)     Other fatigue     Disturbance of skin sensation     Kevin hump     High serum cortisol     Past Medical History:   Diagnosis Date     Anemia      Empty sella (H)     only partially empty; possibly normal vaiant     Exposure to other ionizing radiation, subsequent encounter 1986    [place]     GERD (gastroesophageal reflux disease)      Hypercholesterolemia      Hypertension      Iron deficiency      Multiple thyroid nodules     subcm        Past Surgical History:     Past Surgical History:   Procedure Laterality Date     APPENDECTOMY       COLONOSCOPY       DILATION AND CURETTAGE SUCTION   11/13/2012    Procedure: DILATION AND CURETTAGE SUCTION;  Suction Dilation & Curettage    (8 weeks);  Surgeon: Ken Rose MD;  [facility]     EXCISE GANGLION WRIST  4/10/2014    Procedure: EXCISE GANGLION WRIST;  Excision Ganglion Cyst, Left Wrist;  Surgeon: Ashley Garcia MD;  [facility]     OPERATIVE HYSTEROSCOPY WITH MORCELLATOR  1/19/2012    Procedure:OPERATIVE HYSTEROSCOPY WITH MORCELLATOR; Hysteroscopy & Polypectomy With Morcellator; Surgeon:KEN ROSE; [facility]        Social History:     Social History     Tobacco Use     Smoking status: Never Smoker     Smokeless tobacco: Never Used   Substance Use Topics     Alcohol use: No     Drug use: No        Family History:     Family History   Problem Relation Age of Onset     Lipids Mother      Cancer Mother         desmoplastic mesothelioma     C.A.D. Father         CABG age 65     Thyroid Disease No family hx of      Thyroid Cancer No family hx of         Medications:     Current Outpatient Medications   Medication Sig     atorvastatin (LIPITOR) 20 MG tablet Take 20 mg by mouth daily      Iron-Vitamin C (IRON 100/C) 100-250 MG TABS infusion     LANsoprazole (PREVACID 24HR) 15 MG DR capsule 1 capsule before a meal     LANsoprazole (PREVACID) 30 MG DR capsule TAKE 1 CAPSULE BY MOUTH ONCE DAILYBEFORE A MEAL     LORazepam (ATIVAN) 0.5 MG tablet Take 0.5 mg by mouth as needed     Magnesium 400 MG CAPS as directed     metoprolol (TOPROL-XL) 25 MG 24 hr tablet      metoprolol succinate ER (TOPROL-XL) 25 MG 24 hr tablet TAKE ONE TABLET BY MOUTH ONE TIME DAILY     PREVACID 30 MG OR CPDR 1 CAPSULE DAILY BEFORE EATING     vitamin C (ASCORBIC ACID) 500 MG/5ML liquid 1 tablet     VITAMIN D PO      Zinc 25 MG TABS 1 tablet     No current facility-administered medications for this visit.        Allergies:     Allergies   Allergen Reactions     Iron Dextran Shortness Of Breath     Doxycycline Other (See Comments)     High blood pressure     Cipro  [Ciprofloxacin] Nausea     Liquid Adhesive Itching     Sulphadimidine [Sulfa Drugs] Nausea     Adhesive Tape Rash        Review of Systems:   As above     Physical Exam:   Vitals: /75   LMP 10/04/2013    General: Seated comfortably in no acute distress.  Lungs: breathing comfortably  Extremities: no edema  Skin: No rashes  Neurologic:     Mental Status: Fully alert, attentive and oriented. Normal memory and fund of knowledge. Language normal, speech clear and fluent, no paraphasic errors.     Cranial Nerves: Visual fields intact. EOMI with normal smooth pursuit. Facial sensation intact/symmetric. Facial movements symmetric. Hearing not formally tested but intact to conversation. Palate elevation symmetric, uvula midline. No dysarthria. Shoulder shrug strong bilaterally. Tongue protrusion midline.     Motor: No tremors or other abnormal movements observed. Muscle tone normal throughout. Normal/symmetric rapid finger tapping. Strength 5/5 throughout upper and lower extremities.     Deep Tendon Reflexes: 2+/symmetric throughout upper and lower extremities.      Sensory: Intact/symmetric to light touch throughout upper and lower extremities. Negative Romberg.      Coordination: Finger-nose-finger and heel-shin intact without dysmetria. Rapid alternating movements intact/symmetric with normal speed and rhythm.     Gait: Normal, steady casual gait. Able to walk on toes, heels and tandem without difficulty.     Data reviewed on previous visits    Imaging:  MRI lumbar spine 1/13/2021       MRI thoracic 1/7/2021       MRI cervical 1/7/2021       MRI orbits 1/7/2021       MRI brain 1/7/2021       Procedures:  EMG 3 weeks ago at [facility] reportedly normal      Pertinent Investigations since last visit:   Labs 3/2021- unrevealing A1c, B12, B6, ELP, ESR, CRP, YULY, SSA/SSB, TTG Ab, TSH / Possible very small monoclonal IgG immunoglobulin of lambda light on immunofixation  chain type         Assessment and Plan:    Assessment:  Carolyn Briones is a 55 year old female who presents today for follow-up of abnormal sensations and new symptoms of dizziness. She reports intermittent burning in the soles of the feet, cold/crawling sensation in the thighs, and coldness sensation in the face, neck, shoulders. Prior MRI brain, cervical, thoracic, and lumbar scan were non revealing as to cause. She also had a normal EMG at [facility]. Neuropathy work-up for causes of small fiber neuropathy was largely unremarkable. Small monoclonal IgG immunoglobulin of lambda light chain was seen on immunofixation and has been given diagnosis of MGUS by hematology. I have a low suspicion of monoclonal protein being related to symptoms. Given normal examination I have a low suspicion for small fiber neuropathy and small fiber biopsy is likely low yield.     Dizziness is new over the last week. She has light headed, swaying sensation she has woken up with in the morning, and it improves throughout the same. Dizziness is not specific to a clear etiology and neurological exam is reassuring today. Blood pressure is normal today. We will do routine blood work today checking for metabolic causes of dizziness. Additional testing can be considered if dizziness does not improve. She has mild sinus pressure and should reach out to primary if she develops worsening sinus symptoms.     Plan:  - CBC, CMP today  - Monitor symptoms and call if worsening    Follow up in Neurology clinic pending above    Trenton Rodriguez MD   of Neurology  [facility]    The total time of this encounter amounted to 34 minutes. This time included time spent with the patient, prep work, ordering tests, and performing post visit documentation.

## 2021-09-07 ENCOUNTER — TELEPHONE (OUTPATIENT)
Dept: NEUROLOGY | Facility: CLINIC | Age: 56
End: 2021-09-07

## 2021-09-07 NOTE — TELEPHONE ENCOUNTER
Called patient and Left a voice message that Dr Rodriguez had an opening on 9/8 or 9/14. Patient has an appointment 10/19.        Erica Colindres Procedure   Neurology & Neurosurgery Specialties  [facility]   [phone]

## 2021-09-07 NOTE — TELEPHONE ENCOUNTER
Called and left voicemail. She is on the waitlist for Dr. Rodriguez returns and we have availability currently at 1130 or 1pm tomorrow. Advised her to call back if she would like either of those spots.

## 2021-09-13 ENCOUNTER — TELEPHONE (OUTPATIENT)
Dept: NEUROLOGY | Facility: CLINIC | Age: 56
End: 2021-09-13

## 2021-09-13 NOTE — TELEPHONE ENCOUNTER
9/13 Left voice message that Dr Rodriguez had an opening on 9/29 if she wanted to move her appt up to September.      Erica Colidnres Procedure   Neurology & Neurosurgery Specialties  [facility]   [phone]

## 2021-09-14 NOTE — TELEPHONE ENCOUNTER
[facility] Call Center    Phone Message    May a detailed message be left on voicemail: yes     Reason for Call: The patient returned call regarding message below. Writer lynced care team as there were no openings on 9/29/21. Response from care team was the spot was filled by another patient. Writer relayed this to the patient. She was irritated she was not given adequate time to respond. She will remain on the wait list, and hopes something comes up sooner. Thank you.    Action Taken: Message routed to:  Adult Clinics: Neurology p [phone]    Travel Screening: Not Applicable

## 2021-09-16 ENCOUNTER — TELEPHONE (OUTPATIENT)
Dept: NEUROLOGY | Facility: CLINIC | Age: 56
End: 2021-09-16

## 2021-09-16 NOTE — TELEPHONE ENCOUNTER
9/16 Left a voice message for patient that I had an open slot for her to see Dr Salmon on 10/05 @ 4:30      Erica Colindres Procedure   Neurology & Neurosurgery Specialties  [facility]   [phone]

## 2021-09-19 ENCOUNTER — HEALTH MAINTENANCE LETTER (OUTPATIENT)
Age: 56
End: 2021-09-19

## 2021-11-16 ENCOUNTER — PATIENT OUTREACH (OUTPATIENT)
Dept: ONCOLOGY | Facility: CLINIC | Age: 56
End: 2021-11-16

## 2021-11-16 ENCOUNTER — OFFICE VISIT (OUTPATIENT)
Dept: TRANSPLANT | Facility: CLINIC | Age: 56
End: 2021-11-16
Attending: INTERNAL MEDICINE
Payer: COMMERCIAL

## 2021-11-16 ENCOUNTER — APPOINTMENT (OUTPATIENT)
Dept: LAB | Facility: CLINIC | Age: 56
End: 2021-11-16
Attending: INTERNAL MEDICINE
Payer: COMMERCIAL

## 2021-11-16 VITALS
TEMPERATURE: 97.9 F | SYSTOLIC BLOOD PRESSURE: 104 MMHG | OXYGEN SATURATION: 98 % | RESPIRATION RATE: 16 BRPM | BODY MASS INDEX: 24.69 KG/M2 | DIASTOLIC BLOOD PRESSURE: 67 MMHG | HEART RATE: 64 BPM | WEIGHT: 141.8 LBS

## 2021-11-16 DIAGNOSIS — D47.2 MONOCLONAL GAMMOPATHY PRESENT ON SERUM PROTEIN ELECTROPHORESIS: ICD-10-CM

## 2021-11-16 DIAGNOSIS — D50.8 OTHER IRON DEFICIENCY ANEMIA: Primary | ICD-10-CM

## 2021-11-16 DIAGNOSIS — D50.0 IRON DEFICIENCY ANEMIA DUE TO CHRONIC BLOOD LOSS: ICD-10-CM

## 2021-11-16 LAB
ALBUMIN SERPL-MCNC: 3.6 G/DL (ref 3.4–5)
ALP SERPL-CCNC: 88 U/L (ref 40–150)
ALT SERPL W P-5'-P-CCNC: 26 U/L (ref 0–50)
ANION GAP SERPL CALCULATED.3IONS-SCNC: 8 MMOL/L (ref 3–14)
AST SERPL W P-5'-P-CCNC: 15 U/L (ref 0–45)
BASOPHILS # BLD AUTO: 0 10E3/UL (ref 0–0.2)
BASOPHILS NFR BLD AUTO: 1 %
BILIRUB SERPL-MCNC: 0.3 MG/DL (ref 0.2–1.3)
BUN SERPL-MCNC: 16 MG/DL (ref 7–30)
CALCIUM SERPL-MCNC: 8.7 MG/DL (ref 8.5–10.1)
CHLORIDE BLD-SCNC: 109 MMOL/L (ref 94–109)
CO2 SERPL-SCNC: 27 MMOL/L (ref 20–32)
CREAT SERPL-MCNC: 0.72 MG/DL (ref 0.52–1.04)
EOSINOPHIL # BLD AUTO: 0.3 10E3/UL (ref 0–0.7)
EOSINOPHIL NFR BLD AUTO: 6 %
ERYTHROCYTE [DISTWIDTH] IN BLOOD BY AUTOMATED COUNT: 12.7 % (ref 10–15)
FERRITIN SERPL-MCNC: 16 NG/ML (ref 8–252)
FSH SERPL-ACNC: 98.2 IU/L
GFR SERPL CREATININE-BSD FRML MDRD: >90 ML/MIN/1.73M2
GLUCOSE BLD-MCNC: 90 MG/DL (ref 70–99)
HCT VFR BLD AUTO: 36.7 % (ref 35–47)
HGB BLD-MCNC: 11.9 G/DL (ref 11.7–15.7)
IMM GRANULOCYTES # BLD: 0 10E3/UL
IMM GRANULOCYTES NFR BLD: 0 %
IRON SATN MFR SERPL: 23 % (ref 15–46)
IRON SERPL-MCNC: 71 UG/DL (ref 35–180)
LDH SERPL L TO P-CCNC: 145 U/L (ref 81–234)
LYMPHOCYTES # BLD AUTO: 1.5 10E3/UL (ref 0.8–5.3)
LYMPHOCYTES NFR BLD AUTO: 29 %
MCH RBC QN AUTO: 31.6 PG (ref 26.5–33)
MCHC RBC AUTO-ENTMCNC: 32.4 G/DL (ref 31.5–36.5)
MCV RBC AUTO: 98 FL (ref 78–100)
MONOCYTES # BLD AUTO: 0.3 10E3/UL (ref 0–1.3)
MONOCYTES NFR BLD AUTO: 6 %
NEUTROPHILS # BLD AUTO: 3 10E3/UL (ref 1.6–8.3)
NEUTROPHILS NFR BLD AUTO: 58 %
NRBC # BLD AUTO: 0 10E3/UL
NRBC BLD AUTO-RTO: 0 /100
PLATELET # BLD AUTO: 280 10E3/UL (ref 150–450)
POTASSIUM BLD-SCNC: 3.9 MMOL/L (ref 3.4–5.3)
PROLACTIN SERPL-MCNC: 6 UG/L (ref 3–27)
PROT SERPL-MCNC: 6.9 G/DL (ref 6.8–8.8)
RBC # BLD AUTO: 3.76 10E6/UL (ref 3.8–5.2)
SODIUM SERPL-SCNC: 144 MMOL/L (ref 133–144)
TIBC SERPL-MCNC: 308 UG/DL (ref 240–430)
TOTAL PROTEIN SERUM FOR ELP: 6.8 G/DL (ref 6.8–8.8)
URATE SERPL-MCNC: 5.6 MG/DL (ref 2.6–6)
WBC # BLD AUTO: 5.2 10E3/UL (ref 4–11)

## 2021-11-16 PROCEDURE — 82784 ASSAY IGA/IGD/IGG/IGM EACH: CPT | Performed by: INTERNAL MEDICINE

## 2021-11-16 PROCEDURE — 86335 IMMUNFIX E-PHORSIS/URINE/CSF: CPT | Mod: 26 | Performed by: STUDENT IN AN ORGANIZED HEALTH CARE EDUCATION/TRAINING PROGRAM

## 2021-11-16 PROCEDURE — 83550 IRON BINDING TEST: CPT | Performed by: INTERNAL MEDICINE

## 2021-11-16 PROCEDURE — 85025 COMPLETE CBC W/AUTO DIFF WBC: CPT | Performed by: INTERNAL MEDICINE

## 2021-11-16 PROCEDURE — 83615 LACTATE (LD) (LDH) ENZYME: CPT | Performed by: INTERNAL MEDICINE

## 2021-11-16 PROCEDURE — 36415 COLL VENOUS BLD VENIPUNCTURE: CPT | Performed by: INTERNAL MEDICINE

## 2021-11-16 PROCEDURE — 82728 ASSAY OF FERRITIN: CPT | Performed by: INTERNAL MEDICINE

## 2021-11-16 PROCEDURE — 84155 ASSAY OF PROTEIN SERUM: CPT | Performed by: INTERNAL MEDICINE

## 2021-11-16 PROCEDURE — 84165 PROTEIN E-PHORESIS SERUM: CPT | Mod: TC | Performed by: STUDENT IN AN ORGANIZED HEALTH CARE EDUCATION/TRAINING PROGRAM

## 2021-11-16 PROCEDURE — 84165 PROTEIN E-PHORESIS SERUM: CPT | Mod: 26 | Performed by: STUDENT IN AN ORGANIZED HEALTH CARE EDUCATION/TRAINING PROGRAM

## 2021-11-16 PROCEDURE — 99214 OFFICE O/P EST MOD 30 MIN: CPT | Performed by: INTERNAL MEDICINE

## 2021-11-16 PROCEDURE — 80053 COMPREHEN METABOLIC PANEL: CPT | Performed by: INTERNAL MEDICINE

## 2021-11-16 PROCEDURE — 84146 ASSAY OF PROLACTIN: CPT | Mod: 90 | Performed by: PATHOLOGY

## 2021-11-16 PROCEDURE — 83001 ASSAY OF GONADOTROPIN (FSH): CPT | Mod: 90 | Performed by: PATHOLOGY

## 2021-11-16 PROCEDURE — 86335 IMMUNFIX E-PHORSIS/URINE/CSF: CPT | Mod: TC | Performed by: INTERNAL MEDICINE

## 2021-11-16 PROCEDURE — 99000 SPECIMEN HANDLING OFFICE-LAB: CPT | Performed by: PATHOLOGY

## 2021-11-16 PROCEDURE — 86769 SARS-COV-2 COVID-19 ANTIBODY: CPT | Performed by: INTERNAL MEDICINE

## 2021-11-16 PROCEDURE — 84550 ASSAY OF BLOOD/URIC ACID: CPT | Performed by: INTERNAL MEDICINE

## 2021-11-16 PROCEDURE — 83883 ASSAY NEPHELOMETRY NOT SPEC: CPT | Performed by: INTERNAL MEDICINE

## 2021-11-16 RX ORDER — METHYLPREDNISOLONE SODIUM SUCCINATE 125 MG/2ML
125 INJECTION, POWDER, LYOPHILIZED, FOR SOLUTION INTRAMUSCULAR; INTRAVENOUS
Status: CANCELLED
Start: 2021-11-16

## 2021-11-16 RX ORDER — HEPARIN SODIUM (PORCINE) LOCK FLUSH IV SOLN 100 UNIT/ML 100 UNIT/ML
5 SOLUTION INTRAVENOUS
Status: CANCELLED | OUTPATIENT
Start: 2021-11-16

## 2021-11-16 RX ORDER — ALBUTEROL SULFATE 0.83 MG/ML
2.5 SOLUTION RESPIRATORY (INHALATION)
Status: CANCELLED | OUTPATIENT
Start: 2021-11-16

## 2021-11-16 RX ORDER — ALBUTEROL SULFATE 90 UG/1
1-2 AEROSOL, METERED RESPIRATORY (INHALATION)
Status: CANCELLED
Start: 2021-11-16

## 2021-11-16 RX ORDER — DIPHENHYDRAMINE HYDROCHLORIDE 50 MG/ML
50 INJECTION INTRAMUSCULAR; INTRAVENOUS
Status: CANCELLED
Start: 2021-11-16

## 2021-11-16 RX ORDER — LANSOPRAZOLE 30 MG/1
CAPSULE, DELAYED RELEASE ORAL
COMMUNITY
End: 2021-12-28

## 2021-11-16 RX ORDER — FAMOTIDINE 20 MG/1
20 TABLET, FILM COATED ORAL
COMMUNITY
Start: 2021-08-19

## 2021-11-16 RX ORDER — EPINEPHRINE 1 MG/ML
0.3 INJECTION, SOLUTION INTRAMUSCULAR; SUBCUTANEOUS EVERY 5 MIN PRN
Status: CANCELLED | OUTPATIENT
Start: 2021-11-16

## 2021-11-16 RX ORDER — NALOXONE HYDROCHLORIDE 0.4 MG/ML
0.2 INJECTION, SOLUTION INTRAMUSCULAR; INTRAVENOUS; SUBCUTANEOUS
Status: CANCELLED | OUTPATIENT
Start: 2021-11-16

## 2021-11-16 RX ORDER — HEPARIN SODIUM,PORCINE 10 UNIT/ML
5 VIAL (ML) INTRAVENOUS
Status: CANCELLED | OUTPATIENT
Start: 2021-11-16

## 2021-11-16 RX ORDER — MEPERIDINE HYDROCHLORIDE 25 MG/ML
25 INJECTION INTRAMUSCULAR; INTRAVENOUS; SUBCUTANEOUS EVERY 30 MIN PRN
Status: CANCELLED | OUTPATIENT
Start: 2021-11-16

## 2021-11-16 ASSESSMENT — PAIN SCALES - GENERAL: PAINLEVEL: NO PAIN (0)

## 2021-11-16 NOTE — PROGRESS NOTES
HEMATOLOGY CLINIC VISIT       HISTORY OF PRESENT ILLNESS:  I had the pleasure of seeing Carolyn Briones in the Hematology Clinic for evaluation of iron deficiency.  I have known Carolyn for many years.  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  I last saw her in 03/2021 mainly for evaluation of iron deficiency.  Carolyn had a small monoclonal peak found on immunoelectrophoresis.  There was not evidence of a monoclonal peak.  She was being worked up for a possible neuropathy.  I did not find a monoclonal peak when I saw her in March and wondered whether or not this was a transient monoclonal protein related to recent herpes zoster infection.  She went to the [facility] and had a very thorough evaluation for multiple issues including the monoclonal protein.  She had a bone marrow biopsy done which did not show any evidence of amyloid or multiple myeloma.  All of the serological tests did not show evidence of a monoclonal protein and she had normal antibody levels.  However, she also had GI evaluation.  She was found to have a positive hydrogen breath test and bacterial overgrowth in the small intestine.  She has not been treated for this.  She had an upper endoscopy which did not show any gastritis or esophagitis.  Duodenal biopsies were negative.  She also had a colonoscopy which was basically unremarkable.  Today, she feels well.  She has had 2 Pfizer vaccines and has not had a booster yet.  [place] holidays.    ASSESSMENT:  Carolyn's ferritin has fallen to 16 and her iron saturation 23%.  Her hemoglobin has also fallen to 11.9.  Carolyn had regularly had iron infusions, especially when she had menorrhagia.  She has not had a menstrual period in about 5 years.  I last gave her an iron infusion in early 2016.  She received 4 infusions of Ferrlecit (ferric gluconate) 125 mg at that time.  She has not had any iron since.  She recently stopped  her PPI.  I am not sure why she is iron deficient.  My best guess is that maybe she just never has had adequate storage iron replaced with all the iron infusions.  She only did get 500 mg in 2016.  Furthermore, I am wondering whether the bacterial overgrowth may be affecting iron absorption and that this has been a problem for her.  She has some intermittent abdominal pain.  She denies any other bleeding.  She denies really much fatigue or mental fogginess or ice eating.  I would like to arrange for her to receive Ferrlecit 250 mg twice in the month of December, one week apart.  She would like to have this done at [place] and we will set that up there.  I am glad that there is no evidence of a monoclonal protein after extensive workup at the [facility] including a bone marrow biopsy.  She was going to call the [facility] to see whether or not an iron stain could be done on the bone marrow which I am curious about in regarding her iron stores.  This was done back in August.  I will see her again in 6 months' time.  She also would like us to check her COVID antibody titer.  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           PAST MEDICAL HISTORY, SOCIAL HISTORY AND FAMILY HISTORY:  See my note from 03/2021.      REVIEW OF SYSTEMS:  A 12 point review of systems was done and negative as in HPI.      PHYSICAL EXAMINATION: /67   Pulse 64   Temp 97.9  F (36.6  C) (Oral)   Resp 16   Wt 64.3 kg (141 lb 12.8 oz)   LMP 10/04/2013   SpO2 98%   BMI 24.69 kg/m      GENERAL:  She is an alert woman, somewhat anxious, in no acute distress.   VITAL SIGNS:  Stable.   HEENT:  Normocephalic.  EOM okay.  No scleral icterus.  Mouth without lesion.   RESPIRATORY:  Clear to percussion and auscultation.   CARDIAC:  Normal sinus rhythm.  No S3, S4 or murmur.   ABDOMEN:  Soft without hepatosplenomegaly.   EXTREMITIES:  Without edema.   NEUROLOGIC:  Grossly  normal.  Reflexes were 2+ equal and symmetric.  I did not find any paresthesias.     ASSESSMENT:   1.  Paresthesias.   2.  Hx of shingles.   3.  Iron deficiency anemia.   4.  History of hyperlipidemia.   5.  History of radiation exposure.   6.  Vitamin D deficiency.   7.  Thyroid nodules.   8.  Empty sella syndrome.     9. Bacterial Overgrowth small bowel         Carolyn's ferritin has fallen to 16 and her iron saturation 23%.  Her hemoglobin has also fallen to 11.9.  Carolyn had regularly had iron infusions, especially when she had menorrhagia.  She has not had a menstrual period in about 5 years.  I last gave her an iron infusion in early 2016.  She received 4 infusions of Ferrlecit (ferric gluconate) 125 mg each time(total 500mg Fe).  She has not had any iron since.  She recently stopped her PPI.  I am not sure why she is iron deficient.  My best guess is that maybe she just never has had adequate storage iron replaced with all the iron infusions.  She only did get 500 mg in 2016.  Furthermore, I am wondering whether the bacterial overgrowth may be affecting iron absorption and that this has been a problem for her.  She has some intermittent abdominal pain.  She denies any other bleeding.  She denies really much fatigue or mental fogginess or ice eating.  I would like to arrange for her to receive Ferrlecit 250 mg twice in the month of December, one week apart.  She would like to have this done at [place] and we will set that up there.  I am glad that there is no evidence of a monoclonal protein after extensive workup at the [facility] including a bone marrow biopsy.  She was going to call the [facility] to see whether or not an iron stain could be done on the bone marrow which I am curious about in regarding her iron stores.  This was done back in August.  I will see her again in 6 months' time.  She also would like us to check her COVID antibody titer.  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    I spent a total of 30 minutes face to face with Carolyn Briones during today's office visit. Over 50% of this time was spent counseling the patient and coordinating the care regarding monoclonal gammopathy of uncertain significance  Wilfredo Ruggiero MD  [phone]

## 2021-11-16 NOTE — LETTER
11/16/2021         RE: Carolyn Briones  [address]  [place] [address]        Dear Colleague,    Thank you for referring your patient, Carolyn Briones, to the [facility] BLOOD AND MARROW TRANSPLANT PROGRAM [place]. Please see a copy of my visit note below.    HEMATOLOGY CLINIC VISIT       HISTORY OF PRESENT ILLNESS:  I had the pleasure of seeing Carolyn Briones in the Hematology Clinic for evaluation of iron deficiency.  I have known Carolyn for many years.  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  I last saw her in 03/2021 mainly for evaluation of iron deficiency.  Carolyn had a small monoclonal peak found on immunoelectrophoresis.  There was not evidence of a monoclonal peak.  She was being worked up for a possible neuropathy.  I did not find a monoclonal peak when I saw her in March and wondered whether or not this was a transient monoclonal protein related to recent herpes zoster infection.  She went to the [facility] and had a very thorough evaluation for multiple issues including the monoclonal protein.  She had a bone marrow biopsy done which did not show any evidence of amyloid or multiple myeloma.  All of the serological tests did not show evidence of a monoclonal protein and she had normal antibody levels.  However, she also had GI evaluation.  She was found to have a positive hydrogen breath test and bacterial overgrowth in the small intestine.  She has not been treated for this.  She had an upper endoscopy which did not show any gastritis or esophagitis.  Duodenal biopsies were negative.  She also had a colonoscopy which was basically unremarkable.  Today, she feels well.  She has had 2 Pfizer vaccines and has not had a booster yet.  [place].    ASSESSMENT:  Carolyn's ferritin has fallen to 16 and her iron saturation 23%.  Her hemoglobin has also fallen to 11.9.  Carolyn had regularly had iron infusions,  especially when she had menorrhagia.  She has not had a menstrual period in about 5 years.  I last gave her an iron infusion in early 2016.  She received 4 infusions of Ferrlecit (ferric gluconate) 125 mg at that time.  She has not had any iron since.  She recently stopped her PPI.  I am not sure why she is iron deficient.  My best guess is that maybe she just never has had adequate storage iron replaced with all the iron infusions.  She only did get 500 mg in 2016.  Furthermore, I am wondering whether the bacterial overgrowth may be affecting iron absorption and that this has been a problem for her.  She has some intermittent abdominal pain.  She denies any other bleeding.  She denies really much fatigue or mental fogginess or ice eating.  I would like to arrange for her to receive Ferrlecit 250 mg twice in the month of December, one week apart.  She would like to have this done at [place] and we will set that up there.  I am glad that there is no evidence of a monoclonal protein after extensive workup at the [facility] including a bone marrow biopsy.  She was going to call the [facility] to see whether or not an iron stain could be done on the bone marrow which I am curious about in regarding her iron stores.  This was done back in August.  I will see her again in 6 months' time.  She also would like us to check her COVID antibody titer.  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           PAST MEDICAL HISTORY, SOCIAL HISTORY AND FAMILY HISTORY:  See my note from 03/2021.      REVIEW OF SYSTEMS:  A 12 point review of systems was done and negative as in HPI.      PHYSICAL EXAMINATION: /67   Pulse 64   Temp 97.9  F (36.6  C) (Oral)   Resp 16   Wt 64.3 kg (141 lb 12.8 oz)   LMP 10/04/2013   SpO2 98%   BMI 24.69 kg/m      GENERAL:  She is an alert woman, somewhat anxious, in no acute distress.   VITAL SIGNS:  Stable.   HEENT:   Normocephalic.  EOM okay.  No scleral icterus.  Mouth without lesion.   RESPIRATORY:  Clear to percussion and auscultation.   CARDIAC:  Normal sinus rhythm.  No S3, S4 or murmur.   ABDOMEN:  Soft without hepatosplenomegaly.   EXTREMITIES:  Without edema.   NEUROLOGIC:  Grossly normal.  Reflexes were 2+ equal and symmetric.  I did not find any paresthesias.     ASSESSMENT:   1.  Paresthesias.   2.  Hx of shingles.   3.  Iron deficiency anemia.   4.  History of hyperlipidemia.   5.  History of radiation exposure.   6.  Vitamin D deficiency.   7.  Thyroid nodules.   8.  Empty sella syndrome.     9. Bacterial Overgrowth small bowel         Carolyn's ferritin has fallen to 16 and her iron saturation 23%.  Her hemoglobin has also fallen to 11.9.  Carolyn had regularly had iron infusions, especially when she had menorrhagia.  She has not had a menstrual period in about 5 years.  I last gave her an iron infusion in early 2016.  She received 4 infusions of Ferrlecit (ferric gluconate) 125 mg each time(total 500mg Fe).  She has not had any iron since.  She recently stopped her PPI.  I am not sure why she is iron deficient.  My best guess is that maybe she just never has had adequate storage iron replaced with all the iron infusions.  She only did get 500 mg in 2016.  Furthermore, I am wondering whether the bacterial overgrowth may be affecting iron absorption and that this has been a problem for her.  She has some intermittent abdominal pain.  She denies any other bleeding.  She denies really much fatigue or mental fogginess or ice eating.  I would like to arrange for her to receive Ferrlecit 250 mg twice in the month of December, one week apart.  She would like to have this done at [place] and we will set that up there.  I am glad that there is no evidence of a monoclonal protein after extensive workup at the [facility] including a bone marrow biopsy.  She was going to call the [facility] to see whether or not an iron  stain could be done on the bone marrow which I am curious about in regarding her iron stores.  This was done back in August.  I will see her again in 6 months' time.  She also would like us to check her COVID antibody titer.  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    I spent a total of 30 minutes face to face with Carolyn Briones during today's office visit. Over 50% of this time was spent counseling the patient and coordinating the care regarding monoclonal gammopathy of uncertain significance    Wilfredo Ruggiero MD  [phone]

## 2021-11-16 NOTE — NURSING NOTE
Chief Complaint   Patient presents with     Blood Draw     Vitals, blood drawn via VPT by LPN. Pt checked into appt.      RONI Pineda LPN

## 2021-11-16 NOTE — PROGRESS NOTES
Spoke with Terrie in lab regarding adding SARS-COV-2 nucleocapsid total ab and COVID-19 Gallo RBD Haley & titer reflex to today's lab draw.  Terrie is thinking they can add both tests to blood already drawn and she will work on this.

## 2021-11-17 ENCOUNTER — PATIENT OUTREACH (OUTPATIENT)
Dept: ONCOLOGY | Facility: CLINIC | Age: 56
End: 2021-11-17
Payer: COMMERCIAL

## 2021-11-17 LAB
ALBUMIN SERPL ELPH-MCNC: 4.3 G/DL (ref 3.7–5.1)
ALPHA1 GLOB SERPL ELPH-MCNC: 0.3 G/DL (ref 0.2–0.4)
ALPHA2 GLOB SERPL ELPH-MCNC: 0.7 G/DL (ref 0.5–0.9)
B-GLOBULIN SERPL ELPH-MCNC: 0.7 G/DL (ref 0.6–1)
GAMMA GLOB SERPL ELPH-MCNC: 0.8 G/DL (ref 0.7–1.6)
IGA SERPL-MCNC: 183 MG/DL (ref 84–499)
IGG SERPL-MCNC: 705 MG/DL (ref 610–1616)
IGM SERPL-MCNC: 48 MG/DL (ref 35–242)
KAPPA LC FREE SER-MCNC: 1.4 MG/DL (ref 0.33–1.94)
KAPPA LC FREE/LAMBDA FREE SER NEPH: 1.73 {RATIO} (ref 0.26–1.65)
LAMBDA LC FREE SERPL-MCNC: 0.81 MG/DL (ref 0.57–2.63)
M PROTEIN SERPL ELPH-MCNC: 0 G/DL
PROT ELPH PNL UR ELPH: NORMAL
PROT PATTERN SERPL ELPH-IMP: NORMAL
SARS-COV-2 AB SERPL IA-ACNC: >250 U/ML
SARS-COV-2 AB SERPL QL IA: NEGATIVE
SARS-COV-2 AB SERPL-IMP: POSITIVE

## 2021-11-17 NOTE — PROGRESS NOTES
"Spoke with pt regarding her lab results from yesterday, 11/17.  Discussed SARS-CoV-2 Gallo antibodies were positive meaning she does have antibodies likely from her previous immunizations (and if she had COVID).  Discussed SARS-CoV-2 Nucleocapsid Total antibodies were negative which \"normal\" and what Dr. Kwame Ruggiero expected.  Told her, per Dr. Kwame Ruggiero, that she should still get COVID vaccine boost when available.  Discussed other lab results per her request and answering questions as best able.  Discussed slightly elevated Kappa/Lambda ratio of 1.73.  Informed her, per Dr. Kwame Ruggiero that this is not something to worry about as her monoclonal protein was negative.  Also discussed likely to check again in the future.      "

## 2021-12-23 RX ORDER — DIPHENHYDRAMINE HCL 25 MG
25 CAPSULE ORAL EVERY 6 HOURS PRN
Status: CANCELLED
Start: 2021-12-23

## 2021-12-27 ENCOUNTER — PATIENT OUTREACH (OUTPATIENT)
Dept: ONCOLOGY | Facility: CLINIC | Age: 56
End: 2021-12-27
Payer: COMMERCIAL

## 2021-12-27 DIAGNOSIS — D50.8 OTHER IRON DEFICIENCY ANEMIA: Primary | ICD-10-CM

## 2021-12-27 RX ORDER — EPINEPHRINE 1 MG/ML
0.3 INJECTION, SOLUTION INTRAMUSCULAR; SUBCUTANEOUS EVERY 5 MIN PRN
Status: CANCELLED | OUTPATIENT
Start: 2021-12-27

## 2021-12-27 RX ORDER — NALOXONE HYDROCHLORIDE 0.4 MG/ML
0.2 INJECTION, SOLUTION INTRAMUSCULAR; INTRAVENOUS; SUBCUTANEOUS
Status: CANCELLED | OUTPATIENT
Start: 2021-12-27

## 2021-12-27 RX ORDER — MEPERIDINE HYDROCHLORIDE 25 MG/ML
25 INJECTION INTRAMUSCULAR; INTRAVENOUS; SUBCUTANEOUS EVERY 30 MIN PRN
Status: CANCELLED | OUTPATIENT
Start: 2021-12-27

## 2021-12-27 RX ORDER — ALBUTEROL SULFATE 0.83 MG/ML
2.5 SOLUTION RESPIRATORY (INHALATION)
Status: CANCELLED | OUTPATIENT
Start: 2021-12-27

## 2021-12-27 RX ORDER — DIPHENHYDRAMINE HYDROCHLORIDE 50 MG/ML
50 INJECTION INTRAMUSCULAR; INTRAVENOUS
Status: CANCELLED
Start: 2021-12-27

## 2021-12-27 RX ORDER — HEPARIN SODIUM,PORCINE 10 UNIT/ML
5 VIAL (ML) INTRAVENOUS
Status: CANCELLED | OUTPATIENT
Start: 2021-12-27

## 2021-12-27 RX ORDER — METHYLPREDNISOLONE SODIUM SUCCINATE 125 MG/2ML
125 INJECTION, POWDER, LYOPHILIZED, FOR SOLUTION INTRAMUSCULAR; INTRAVENOUS
Status: CANCELLED
Start: 2021-12-27

## 2021-12-27 RX ORDER — ALBUTEROL SULFATE 90 UG/1
1-2 AEROSOL, METERED RESPIRATORY (INHALATION)
Status: CANCELLED
Start: 2021-12-27

## 2021-12-27 RX ORDER — HEPARIN SODIUM (PORCINE) LOCK FLUSH IV SOLN 100 UNIT/ML 100 UNIT/ML
5 SOLUTION INTRAVENOUS
Status: CANCELLED | OUTPATIENT
Start: 2021-12-27

## 2021-12-27 NOTE — PROGRESS NOTES
Per conversation with pt, she recently broke 2 bones in L ankle and needs to rely on her father for rides (she stated she usually takes care of him).  Because he is driving, will need to come later in morning.  Also discussed situation with Azalea,  at [place] who stated has hold on 11:00 appt which has enough time for observation if no pre-med (or if pt takes at home).  Also discussed with Nydia, charge nurse.  Pt requesting (1) change in Ferrlecit (ferric gluconate) dose to 125 mg instead of 250 mg; (2) either no Benadryl pre-med (or ok for her to take at home); (3) no observation period.  Dr. Ruggiero changed dose to 125 mg but plan then includes 8 infusions; no pre-medication; shortened observation period of 5 minutes.  Spoke with informing her of above changes.  Discussed plan infusions increases to 8 in order to give her same total dose as she would have received in 4 doses of 250 mg.  Pt hesitant on whether she needs more that 125 mg x 4 doses.  Also stated that [facility] did iron staining on part (core) of her BMbx tissue from April, 2021.  Pt wants Dr. Kwame Ruggiero to review this new information in case helps answer how many Ferrlecit doses she needs.    Pt to call Azalea after our conversation as Azalea had called her while we were talking.  Per review of Care Everywhere, addendum noted on 4/8/21 hematopathology report from [place].

## 2021-12-28 ENCOUNTER — INFUSION THERAPY VISIT (OUTPATIENT)
Dept: INFUSION THERAPY | Facility: CLINIC | Age: 56
End: 2021-12-28
Payer: COMMERCIAL

## 2021-12-28 VITALS
SYSTOLIC BLOOD PRESSURE: 132 MMHG | TEMPERATURE: 98 F | HEART RATE: 71 BPM | OXYGEN SATURATION: 100 % | DIASTOLIC BLOOD PRESSURE: 83 MMHG | RESPIRATION RATE: 16 BRPM

## 2021-12-28 DIAGNOSIS — D50.8 OTHER IRON DEFICIENCY ANEMIA: Primary | ICD-10-CM

## 2021-12-28 PROCEDURE — 96366 THER/PROPH/DIAG IV INF ADDON: CPT | Performed by: NURSE PRACTITIONER

## 2021-12-28 PROCEDURE — 99207 PR NO CHARGE LOS: CPT

## 2021-12-28 PROCEDURE — 96365 THER/PROPH/DIAG IV INF INIT: CPT | Performed by: NURSE PRACTITIONER

## 2021-12-28 RX ORDER — MEPERIDINE HYDROCHLORIDE 25 MG/ML
25 INJECTION INTRAMUSCULAR; INTRAVENOUS; SUBCUTANEOUS EVERY 30 MIN PRN
Status: CANCELLED | OUTPATIENT
Start: 2021-12-30

## 2021-12-28 RX ORDER — METHYLPREDNISOLONE SODIUM SUCCINATE 125 MG/2ML
125 INJECTION, POWDER, LYOPHILIZED, FOR SOLUTION INTRAMUSCULAR; INTRAVENOUS
Status: CANCELLED
Start: 2021-12-30

## 2021-12-28 RX ORDER — HEPARIN SODIUM,PORCINE 10 UNIT/ML
5 VIAL (ML) INTRAVENOUS
Status: CANCELLED | OUTPATIENT
Start: 2021-12-30

## 2021-12-28 RX ORDER — ALBUTEROL SULFATE 0.83 MG/ML
2.5 SOLUTION RESPIRATORY (INHALATION)
Status: CANCELLED | OUTPATIENT
Start: 2021-12-30

## 2021-12-28 RX ORDER — IBUPROFEN 600 MG/1
TABLET, FILM COATED ORAL
COMMUNITY
Start: 2021-12-21 | End: 2023-12-20

## 2021-12-28 RX ORDER — OXYCODONE HYDROCHLORIDE 5 MG/1
5-10 TABLET ORAL
COMMUNITY
Start: 2021-12-23 | End: 2023-12-20

## 2021-12-28 RX ORDER — ALBUTEROL SULFATE 90 UG/1
1-2 AEROSOL, METERED RESPIRATORY (INHALATION)
Status: CANCELLED
Start: 2021-12-30

## 2021-12-28 RX ORDER — EPINEPHRINE 1 MG/ML
0.3 INJECTION, SOLUTION INTRAMUSCULAR; SUBCUTANEOUS EVERY 5 MIN PRN
Status: CANCELLED | OUTPATIENT
Start: 2021-12-30

## 2021-12-28 RX ORDER — DIPHENHYDRAMINE HYDROCHLORIDE 50 MG/ML
50 INJECTION INTRAMUSCULAR; INTRAVENOUS
Status: CANCELLED
Start: 2021-12-30

## 2021-12-28 RX ORDER — NALOXONE HYDROCHLORIDE 0.4 MG/ML
0.2 INJECTION, SOLUTION INTRAMUSCULAR; INTRAVENOUS; SUBCUTANEOUS
Status: CANCELLED | OUTPATIENT
Start: 2021-12-30

## 2021-12-28 RX ORDER — HEPARIN SODIUM (PORCINE) LOCK FLUSH IV SOLN 100 UNIT/ML 100 UNIT/ML
5 SOLUTION INTRAVENOUS
Status: CANCELLED | OUTPATIENT
Start: 2021-12-30

## 2021-12-28 RX ADMIN — Medication 250 ML: at 11:52

## 2021-12-28 NOTE — PROGRESS NOTES
When infusion has 10 ml left in the bag, patient stated she felt nausea.  Vitals stable.  Mint was given.  Rest if infusion was given without incident.      After infusion was finished, patient asked for gingerale as had been offered before.  Patient stated the gingerale tasted bitter, she wondered why it tasted so bitter. Stated this has never happened before. She asked about the expiration date on the can which was 9/22.  She wondered if the can had some poison in it.  Lemon lime was given, patient stated that tasted fine.  Patient asked about how the gingerale was packaged, if it came bundled to us.  Writer got a gingerale from stock, tasted it, it tasted fine.  Patient tasted a new gingerale which she said tasted fine. She asked for the original can and kept it in case it had to be tested for poison.  She repeated this had never happened before and she is worried about it.  The original gingerale was poured into an emptied water cup  that had been on the tray table and asked if there could have been some medicine or something put into her cup.   Writer was reassuring to patient, stated it was possible the mint could have made the tastebud different with the first sips of pop.      After patient was settled and felt she was ok. Patient discharged hoping she remain be ok.  She agrees to let us know of any of her needs.

## 2021-12-28 NOTE — PROGRESS NOTES
"Infusion Nursing Note:  Carolyn Briones presents today for Ferrlecit dose 1 of 8.    Patient seen by provider today: No   present during visit today: Not Applicable.    Note: Patient arrived 20 minutes late for infusion.    Arrives on crutches. Needs assistance carrying coat and bags.    Patient expressed disappointment with answering ONC Toxicity Assessment questions.  States she has been through a lot in the past week and \"everyone asks the same questions.\"    Ferrlecit ordered for 60 minutes.  Patient states she \"always\" gets Ferrlecit over 2 hours.  Most recent infusions, which were in 2016, were administered over 90 minutes.  Patient states she wants infusion over 2 hours as it has been 5 years since she has received Ferrlecit.  Discussed with pharmacist whom states okay to administer over 2 hours.    Intravenous Access:  Peripheral IV placed. Placed in left upper forearm per patient's request.    Treatment Conditions:  Not Applicable.    Post Infusion Assessment:  Patient tolerated infusion with the mild sensation of nausea at the end of the infusion. Nausea was relieved with a mint.  Blood return noted pre and post infusion.  Order is for 5 minute observation.  Patient requested 15 minutes observation as she had the nausea sensation. Observed for 15 minutes without any further complications.  Site patent and intact, free from redness, edema or discomfort.  No evidence of extravasations.  Access discontinued per protocol.       Discharge Plan:   AVS to patient via [employer].  Patient will return 1/10/22 for next appointment. I sent a message to schedulers to increase next 3 infusion appts from 120 minute length, to 180 minute appt length.  Patient discharged in stable condition accompanied by: self. Dad drove patient here.  But patient states she will drive herself home, as her dad had to leave.  Departure Mode: Ambulatory with crutches.      Concepción Butcher RN                    "

## 2021-12-29 ENCOUNTER — APPOINTMENT (OUTPATIENT)
Dept: URBAN - METROPOLITAN AREA CLINIC 259 | Age: 56
Setting detail: DERMATOLOGY
End: 2021-12-29

## 2021-12-29 VITALS — HEIGHT: 63.5 IN | WEIGHT: 139 LBS

## 2021-12-29 DIAGNOSIS — D18.0 HEMANGIOMA: ICD-10-CM

## 2021-12-29 DIAGNOSIS — L82.1 OTHER SEBORRHEIC KERATOSIS: ICD-10-CM

## 2021-12-29 DIAGNOSIS — Z71.89 OTHER SPECIFIED COUNSELING: ICD-10-CM

## 2021-12-29 DIAGNOSIS — D22 MELANOCYTIC NEVI: ICD-10-CM

## 2021-12-29 DIAGNOSIS — L57.8 OTHER SKIN CHANGES DUE TO CHRONIC EXPOSURE TO NONIONIZING RADIATION: ICD-10-CM

## 2021-12-29 DIAGNOSIS — L81.4 OTHER MELANIN HYPERPIGMENTATION: ICD-10-CM

## 2021-12-29 PROBLEM — D18.01 HEMANGIOMA OF SKIN AND SUBCUTANEOUS TISSUE: Status: ACTIVE | Noted: 2021-12-29

## 2021-12-29 PROBLEM — D22.5 MELANOCYTIC NEVI OF TRUNK: Status: ACTIVE | Noted: 2021-12-29

## 2021-12-29 PROCEDURE — OTHER COUNSELING: OTHER

## 2021-12-29 PROCEDURE — 99213 OFFICE O/P EST LOW 20 MIN: CPT

## 2021-12-29 PROCEDURE — OTHER MIPS QUALITY: OTHER

## 2021-12-29 PROCEDURE — OTHER DEFER: OTHER

## 2021-12-29 ASSESSMENT — LOCATION DETAILED DESCRIPTION DERM
LOCATION DETAILED: LEFT INFERIOR CENTRAL MALAR CHEEK
LOCATION DETAILED: LEFT MEDIAL UPPER BACK
LOCATION DETAILED: LEFT INFERIOR MEDIAL UPPER BACK
LOCATION DETAILED: INFERIOR THORACIC SPINE
LOCATION DETAILED: RIGHT SUPERIOR MEDIAL UPPER BACK

## 2021-12-29 ASSESSMENT — LOCATION SIMPLE DESCRIPTION DERM
LOCATION SIMPLE: LEFT UPPER BACK
LOCATION SIMPLE: UPPER BACK
LOCATION SIMPLE: LEFT CHEEK
LOCATION SIMPLE: RIGHT UPPER BACK

## 2021-12-29 ASSESSMENT — LOCATION ZONE DERM
LOCATION ZONE: TRUNK
LOCATION ZONE: FACE

## 2021-12-29 NOTE — HPI: FULL BODY SKIN EXAMINATION
What Type Of Note Output Would You Prefer (Optional)?: Standard Output
What Is The Reason For Today's Visit?: Full Body Skin Examination
What Is The Reason For Today's Visit? (Being Monitored For X): concerning skin lesions on an annual basis
Additional History: When asked if she has any specific concerns the patient explains she has lots on spots on her face.

## 2021-12-29 NOTE — PROCEDURE: MIPS QUALITY
Quality 226: Preventive Care And Screening: Tobacco Use: Screening And Cessation Intervention: Patient screened for tobacco use and is an ex/non-smoker
Detail Level: Generalized
Quality 431: Preventive Care And Screening: Unhealthy Alcohol Use - Screening: Patient not identified as an unhealthy alcohol user when screened for unhealthy alcohol use using a systematic screening method
Quality 130: Documentation Of Current Medications In The Medical Record: Current Medications Documented
Quality 110: Preventive Care And Screening: Influenza Immunization: Influenza Immunization Ordered or Recommended, but not Administered due to system reason

## 2021-12-29 NOTE — PROCEDURE: DEFER
Procedure To Be Performed At Next Visit: Cryotherapy
Scheduling Instructions (Optional): 30 minutes
Introduction Text (Please End With A Colon): The following procedure was deferred:
Detail Level: Generalized

## 2022-01-09 ENCOUNTER — HEALTH MAINTENANCE LETTER (OUTPATIENT)
Age: 57
End: 2022-01-09

## 2022-01-12 ENCOUNTER — INFUSION THERAPY VISIT (OUTPATIENT)
Dept: INFUSION THERAPY | Facility: CLINIC | Age: 57
End: 2022-01-12
Attending: INTERNAL MEDICINE
Payer: COMMERCIAL

## 2022-01-12 VITALS
OXYGEN SATURATION: 95 % | HEART RATE: 68 BPM | DIASTOLIC BLOOD PRESSURE: 66 MMHG | SYSTOLIC BLOOD PRESSURE: 97 MMHG | TEMPERATURE: 98.1 F

## 2022-01-12 DIAGNOSIS — D50.8 OTHER IRON DEFICIENCY ANEMIA: Primary | ICD-10-CM

## 2022-01-12 PROCEDURE — 96365 THER/PROPH/DIAG IV INF INIT: CPT | Performed by: NURSE PRACTITIONER

## 2022-01-12 PROCEDURE — 99207 PR NO CHARGE LOS: CPT

## 2022-01-12 PROCEDURE — 96366 THER/PROPH/DIAG IV INF ADDON: CPT | Performed by: NURSE PRACTITIONER

## 2022-01-12 RX ORDER — EPINEPHRINE 1 MG/ML
0.3 INJECTION, SOLUTION INTRAMUSCULAR; SUBCUTANEOUS EVERY 5 MIN PRN
Status: CANCELLED | OUTPATIENT
Start: 2022-01-13

## 2022-01-12 RX ORDER — NALOXONE HYDROCHLORIDE 0.4 MG/ML
0.2 INJECTION, SOLUTION INTRAMUSCULAR; INTRAVENOUS; SUBCUTANEOUS
Status: CANCELLED | OUTPATIENT
Start: 2022-01-13

## 2022-01-12 RX ORDER — ALBUTEROL SULFATE 0.83 MG/ML
2.5 SOLUTION RESPIRATORY (INHALATION)
Status: CANCELLED | OUTPATIENT
Start: 2022-01-13

## 2022-01-12 RX ORDER — HEPARIN SODIUM (PORCINE) LOCK FLUSH IV SOLN 100 UNIT/ML 100 UNIT/ML
5 SOLUTION INTRAVENOUS
Status: CANCELLED | OUTPATIENT
Start: 2022-01-13

## 2022-01-12 RX ORDER — HEPARIN SODIUM,PORCINE 10 UNIT/ML
5 VIAL (ML) INTRAVENOUS
Status: CANCELLED | OUTPATIENT
Start: 2022-01-13

## 2022-01-12 RX ORDER — ALBUTEROL SULFATE 90 UG/1
1-2 AEROSOL, METERED RESPIRATORY (INHALATION)
Status: CANCELLED
Start: 2022-01-13

## 2022-01-12 RX ORDER — MEPERIDINE HYDROCHLORIDE 25 MG/ML
25 INJECTION INTRAMUSCULAR; INTRAVENOUS; SUBCUTANEOUS EVERY 30 MIN PRN
Status: CANCELLED | OUTPATIENT
Start: 2022-01-13

## 2022-01-12 RX ORDER — DIPHENHYDRAMINE HYDROCHLORIDE 50 MG/ML
50 INJECTION INTRAMUSCULAR; INTRAVENOUS
Status: CANCELLED
Start: 2022-01-13

## 2022-01-12 RX ORDER — METHYLPREDNISOLONE SODIUM SUCCINATE 125 MG/2ML
125 INJECTION, POWDER, LYOPHILIZED, FOR SOLUTION INTRAMUSCULAR; INTRAVENOUS
Status: CANCELLED
Start: 2022-01-13

## 2022-01-12 RX ADMIN — Medication 250 ML: at 13:19

## 2022-01-12 ASSESSMENT — PAIN SCALES - GENERAL: PAINLEVEL: MILD PAIN (3)

## 2022-01-12 NOTE — PROGRESS NOTES
Infusion Nursing Note:  Carolyn Briones presents today for Ferrlicit.    Patient seen by provider today: No   present during visit today: Not Applicable.    Note: Pt arrived for appt today 20mintues late, in a wheelchair with L leg in a boot.  C/o pain in L leg, 3/10.  See flow sheet for assessment.  Pt requesting Ferrlecit to be run over 2 hours, pharmacy approved the rate, pt states this is the rate she's had it in the past (2016) and doesn't want to change it.      Intravenous Access:  Peripheral IV placed.    Treatment Conditions:  Not Applicable.      Post Infusion Assessment:  Patient tolerated infusion without incident.  Blood return noted pre and post infusion.  Site patent and intact, free from redness, edema or discomfort.  No evidence of extravasations.  Access discontinued per protocol.       Discharge Plan:   Patient discharged in stable condition accompanied by: self.  Departure Mode: Ambulatory.  Pt will RTC 1/26/22 for Ferrlect 3/8.      Amos Davalos RN

## 2022-01-27 ENCOUNTER — INFUSION THERAPY VISIT (OUTPATIENT)
Dept: INFUSION THERAPY | Facility: CLINIC | Age: 57
End: 2022-01-27
Attending: INTERNAL MEDICINE
Payer: COMMERCIAL

## 2022-01-27 VITALS
DIASTOLIC BLOOD PRESSURE: 59 MMHG | HEART RATE: 69 BPM | TEMPERATURE: 97.9 F | SYSTOLIC BLOOD PRESSURE: 96 MMHG | OXYGEN SATURATION: 97 % | RESPIRATION RATE: 18 BRPM

## 2022-01-27 DIAGNOSIS — D50.8 OTHER IRON DEFICIENCY ANEMIA: Primary | ICD-10-CM

## 2022-01-27 PROCEDURE — 96365 THER/PROPH/DIAG IV INF INIT: CPT | Performed by: NURSE PRACTITIONER

## 2022-01-27 PROCEDURE — 99207 PR NO CHARGE LOS: CPT

## 2022-01-27 PROCEDURE — 96366 THER/PROPH/DIAG IV INF ADDON: CPT | Performed by: NURSE PRACTITIONER

## 2022-01-27 RX ORDER — MEPERIDINE HYDROCHLORIDE 25 MG/ML
25 INJECTION INTRAMUSCULAR; INTRAVENOUS; SUBCUTANEOUS EVERY 30 MIN PRN
Status: CANCELLED | OUTPATIENT
Start: 2022-01-28

## 2022-01-27 RX ORDER — HEPARIN SODIUM,PORCINE 10 UNIT/ML
5 VIAL (ML) INTRAVENOUS
Status: CANCELLED | OUTPATIENT
Start: 2022-01-28

## 2022-01-27 RX ORDER — ALBUTEROL SULFATE 0.83 MG/ML
2.5 SOLUTION RESPIRATORY (INHALATION)
Status: CANCELLED | OUTPATIENT
Start: 2022-01-28

## 2022-01-27 RX ORDER — ALBUTEROL SULFATE 90 UG/1
1-2 AEROSOL, METERED RESPIRATORY (INHALATION)
Status: CANCELLED
Start: 2022-01-28

## 2022-01-27 RX ORDER — METHYLPREDNISOLONE SODIUM SUCCINATE 125 MG/2ML
125 INJECTION, POWDER, LYOPHILIZED, FOR SOLUTION INTRAMUSCULAR; INTRAVENOUS
Status: CANCELLED
Start: 2022-01-28

## 2022-01-27 RX ORDER — NALOXONE HYDROCHLORIDE 0.4 MG/ML
0.2 INJECTION, SOLUTION INTRAMUSCULAR; INTRAVENOUS; SUBCUTANEOUS
Status: CANCELLED | OUTPATIENT
Start: 2022-01-28

## 2022-01-27 RX ORDER — DIPHENHYDRAMINE HYDROCHLORIDE 50 MG/ML
50 INJECTION INTRAMUSCULAR; INTRAVENOUS
Status: CANCELLED
Start: 2022-01-28

## 2022-01-27 RX ORDER — EPINEPHRINE 1 MG/ML
0.3 INJECTION, SOLUTION INTRAMUSCULAR; SUBCUTANEOUS EVERY 5 MIN PRN
Status: CANCELLED | OUTPATIENT
Start: 2022-01-28

## 2022-01-27 RX ORDER — HEPARIN SODIUM (PORCINE) LOCK FLUSH IV SOLN 100 UNIT/ML 100 UNIT/ML
5 SOLUTION INTRAVENOUS
Status: CANCELLED | OUTPATIENT
Start: 2022-01-28

## 2022-01-27 RX ADMIN — Medication 250 ML: at 14:17

## 2022-01-27 ASSESSMENT — PAIN SCALES - GENERAL: PAINLEVEL: MILD PAIN (2)

## 2022-01-27 NOTE — PROGRESS NOTES
"Infusion Nursing Note:  Carolyn Briones presents today for Ferrlecit 3 of 8 ordered.   Patient seen by provider today: No   present during visit today: Not Applicable.    Note: Pt states she is feeling tired, but she has been dealing with a heavy left foot boot for weeks post orthopedic surgery and has really struggled ambulating with crutches. \"It doesn't help that I live in a multi story home\".  Pt states she would like her infusion to be given over 2 hours, \"to prevent a reaction, I have always had it over 2 hours\".        Intravenous Access:  Peripheral IV placed - 24 gauge angio X 1 attempt    Treatment Conditions:  Not Applicable.      Post Infusion Assessment:  Patient tolerated infusion without incident.  Blood return noted pre and post infusion.  Site patent and intact, free from redness, edema or discomfort.  No evidence of extravasations.  Access discontinued per protocol.       Discharge Plan:   Pt instructed to make more appointments - pt verbalized understanding.  Discharge instructions reviewed with: Patient.  Patient and/or family verbalized understanding of discharge instructions and all questions answered.  Patient discharged in stable condition accompanied by: self.  Departure Mode: Ambulatory.      Layne Vazquez, RN                    "

## 2022-02-07 ENCOUNTER — INFUSION THERAPY VISIT (OUTPATIENT)
Dept: INFUSION THERAPY | Facility: CLINIC | Age: 57
End: 2022-02-07
Payer: COMMERCIAL

## 2022-02-07 DIAGNOSIS — D50.8 OTHER IRON DEFICIENCY ANEMIA: Primary | ICD-10-CM

## 2022-02-07 PROCEDURE — 99207 PR NO CHARGE LOS: CPT

## 2022-02-07 PROCEDURE — 96365 THER/PROPH/DIAG IV INF INIT: CPT | Performed by: INTERNAL MEDICINE

## 2022-02-07 PROCEDURE — 96366 THER/PROPH/DIAG IV INF ADDON: CPT | Performed by: INTERNAL MEDICINE

## 2022-02-07 RX ORDER — EPINEPHRINE 1 MG/ML
0.3 INJECTION, SOLUTION INTRAMUSCULAR; SUBCUTANEOUS EVERY 5 MIN PRN
Status: CANCELLED | OUTPATIENT
Start: 2022-02-08

## 2022-02-07 RX ORDER — HEPARIN SODIUM,PORCINE 10 UNIT/ML
5 VIAL (ML) INTRAVENOUS
Status: CANCELLED | OUTPATIENT
Start: 2022-02-08

## 2022-02-07 RX ORDER — HEPARIN SODIUM (PORCINE) LOCK FLUSH IV SOLN 100 UNIT/ML 100 UNIT/ML
5 SOLUTION INTRAVENOUS
Status: CANCELLED | OUTPATIENT
Start: 2022-02-08

## 2022-02-07 RX ORDER — DIPHENHYDRAMINE HYDROCHLORIDE 50 MG/ML
50 INJECTION INTRAMUSCULAR; INTRAVENOUS
Status: CANCELLED
Start: 2022-02-08

## 2022-02-07 RX ORDER — ALBUTEROL SULFATE 90 UG/1
1-2 AEROSOL, METERED RESPIRATORY (INHALATION)
Status: CANCELLED
Start: 2022-02-08

## 2022-02-07 RX ORDER — NALOXONE HYDROCHLORIDE 0.4 MG/ML
0.2 INJECTION, SOLUTION INTRAMUSCULAR; INTRAVENOUS; SUBCUTANEOUS
Status: CANCELLED | OUTPATIENT
Start: 2022-02-08

## 2022-02-07 RX ORDER — METHYLPREDNISOLONE SODIUM SUCCINATE 125 MG/2ML
125 INJECTION, POWDER, LYOPHILIZED, FOR SOLUTION INTRAMUSCULAR; INTRAVENOUS
Status: CANCELLED
Start: 2022-02-08

## 2022-02-07 RX ORDER — ALBUTEROL SULFATE 0.83 MG/ML
2.5 SOLUTION RESPIRATORY (INHALATION)
Status: CANCELLED | OUTPATIENT
Start: 2022-02-08

## 2022-02-07 RX ORDER — MEPERIDINE HYDROCHLORIDE 25 MG/ML
25 INJECTION INTRAMUSCULAR; INTRAVENOUS; SUBCUTANEOUS EVERY 30 MIN PRN
Status: CANCELLED | OUTPATIENT
Start: 2022-02-08

## 2022-02-07 RX ADMIN — Medication 250 ML: at 14:30

## 2022-02-07 ASSESSMENT — PAIN SCALES - GENERAL: PAINLEVEL: MILD PAIN (3)

## 2022-02-07 NOTE — PROGRESS NOTES
Post Infusion Assessment:  Patient tolerated infusion without incident.  Site patent and intact, free from redness, edema or discomfort.  No evidence of extravasations.  Access discontinued per protocol.       Discharge Plan:   AVS to patient via MYCHART.  Patient will call for next appointment.   Patient discharged in stable condition accompanied by: self.  Departure Mode: Ambulatory.      Lorene Teague RN

## 2022-02-07 NOTE — PROGRESS NOTES
Infusion Nursing Note:  Carolyn Briones presents today for Ferrlecit.    Patient seen by provider today: No   present during visit today: Not Applicable.    Note: Pt denies any medical concerns. The pt reports tolerating her infusions well. Ferrlecit infused over two hours per pt request. Pharmacist aware and okay with this plan.     Intravenous Access:  Peripheral IV placed.    Treatment Conditions:  Not Applicable.    Patient directed to scheduling to make future appointments.    End of shift report given to infusion RN for completion of care.        Tiffani Peter RN

## 2022-02-08 ENCOUNTER — PATIENT OUTREACH (OUTPATIENT)
Dept: ONCOLOGY | Facility: CLINIC | Age: 57
End: 2022-02-08
Payer: COMMERCIAL

## 2022-02-08 DIAGNOSIS — D50.8 OTHER IRON DEFICIENCY ANEMIA: Primary | ICD-10-CM

## 2022-02-08 NOTE — PROGRESS NOTES
"Spoke with pt today and informed her, per Dr. Kwame Ruggiero, BMbx iron stores were \"markedly decreased\".  Discussed did not know if she wanted to schedule more of the 8 Ferrlecit infusions Dr. Kwame Ruggiero had ordered or if she wanted to check labs prior to scheduling additional Ferrlicet infusions.  Pt stated she would like to schedule labs at [facility].  Discussed she would like lab appt on Monday, 2/21 in late afternoon such as 3:00 pm (or Tuesday, 2/22 afternoon).  RN sent message to Clinical Coordinators.  Also discussed with pt, she may want to schedule another infusion in [place] later in week of 2/21 or early the next week.  She stated she would call to schedule infusion appt.  "

## 2022-02-21 ENCOUNTER — LAB (OUTPATIENT)
Dept: LAB | Facility: CLINIC | Age: 57
End: 2022-02-21
Payer: COMMERCIAL

## 2022-02-21 DIAGNOSIS — D50.8 OTHER IRON DEFICIENCY ANEMIA: Primary | ICD-10-CM

## 2022-02-21 DIAGNOSIS — D50.8 OTHER IRON DEFICIENCY ANEMIA: ICD-10-CM

## 2022-02-21 LAB
BASOPHILS # BLD AUTO: 0 10E3/UL (ref 0–0.2)
BASOPHILS NFR BLD AUTO: 1 %
EOSINOPHIL # BLD AUTO: 0.5 10E3/UL (ref 0–0.7)
EOSINOPHIL NFR BLD AUTO: 7 %
ERYTHROCYTE [DISTWIDTH] IN BLOOD BY AUTOMATED COUNT: 12.9 % (ref 10–15)
FERRITIN SERPL-MCNC: 155 NG/ML (ref 8–252)
HCT VFR BLD AUTO: 41.8 % (ref 35–47)
HGB BLD-MCNC: 13.8 G/DL (ref 11.7–15.7)
IMM GRANULOCYTES # BLD: 0 10E3/UL
IMM GRANULOCYTES NFR BLD: 0 %
IRON SATN MFR SERPL: 26 % (ref 15–46)
IRON SERPL-MCNC: 76 UG/DL (ref 35–180)
LYMPHOCYTES # BLD AUTO: 1.8 10E3/UL (ref 0.8–5.3)
LYMPHOCYTES NFR BLD AUTO: 27 %
MCH RBC QN AUTO: 31.8 PG (ref 26.5–33)
MCHC RBC AUTO-ENTMCNC: 33 G/DL (ref 31.5–36.5)
MCV RBC AUTO: 96 FL (ref 78–100)
MONOCYTES # BLD AUTO: 0.5 10E3/UL (ref 0–1.3)
MONOCYTES NFR BLD AUTO: 8 %
NEUTROPHILS # BLD AUTO: 3.8 10E3/UL (ref 1.6–8.3)
NEUTROPHILS NFR BLD AUTO: 57 %
NRBC # BLD AUTO: 0 10E3/UL
NRBC BLD AUTO-RTO: 0 /100
PLATELET # BLD AUTO: 301 10E3/UL (ref 150–450)
RBC # BLD AUTO: 4.34 10E6/UL (ref 3.8–5.2)
TIBC SERPL-MCNC: 289 UG/DL (ref 240–430)
WBC # BLD AUTO: 6.6 10E3/UL (ref 4–11)

## 2022-02-21 PROCEDURE — 85025 COMPLETE CBC W/AUTO DIFF WBC: CPT

## 2022-02-21 PROCEDURE — 82728 ASSAY OF FERRITIN: CPT

## 2022-02-21 PROCEDURE — 83550 IRON BINDING TEST: CPT

## 2022-02-21 PROCEDURE — 36415 COLL VENOUS BLD VENIPUNCTURE: CPT

## 2022-02-22 ENCOUNTER — PATIENT OUTREACH (OUTPATIENT)
Dept: ONCOLOGY | Facility: CLINIC | Age: 57
End: 2022-02-22
Payer: COMMERCIAL

## 2022-02-22 NOTE — PROGRESS NOTES
"Per Dr. Kwame Ruggiero, pt to stop Ferrlecit at this time (including cancel Feb 25 appt) based on her lab results from yesterday 2/21/22.    Discussed above with pt.  Also reviewed lab results including:  Results for MING HOWARD (MRN [id number]) as of 2/22/2022 09:18   Ref. Range 2/21/2022 16:13   Ferritin Latest Ref Range: 8 - 252 ng/mL 155   Iron Latest Ref Range: 35 - 180 ug/dL 76   Iron Binding Cap Latest Ref Range: 240 - 430 ug/dL 289   Iron Saturation Index Latest Ref Range: 15 - 46 % 26     Also discussed Hgb good at 13.8.and other parts of CBC are within \"normal\" such as WBC and plts.  Explained role of plts when pt asked.    Pt also asked what test looks at how \"thick\" blood is.  During discussion to clarify question, pt stated her dad has his INR checked periodically.  She also stated he is on warfarin for atrial fibrillation.  Explained this means he has a physical situation (atrial fibrillation) that increases his chance of developing a clot.  In situations like this, patients are usually on warfarin to help prevent clot formation.  She stated she understood information and does not now feel need to request her INR checked.  RN had also stated usually pt would need to discuss such a request with provider, Dr. Ruggiero, before he would order such testing.  Pt stated she appreciated information and now understands why her dad is on warfarin as well as that she does not need additional lab testing at this time.    "

## 2022-05-04 ENCOUNTER — APPOINTMENT (OUTPATIENT)
Dept: URBAN - METROPOLITAN AREA CLINIC 259 | Age: 57
Setting detail: DERMATOLOGY
End: 2022-05-04

## 2022-05-04 VITALS — WEIGHT: 137 LBS | HEIGHT: 63.75 IN

## 2022-05-04 DIAGNOSIS — L82.1 OTHER SEBORRHEIC KERATOSIS: ICD-10-CM

## 2022-05-04 DIAGNOSIS — L57.8 OTHER SKIN CHANGES DUE TO CHRONIC EXPOSURE TO NONIONIZING RADIATION: ICD-10-CM

## 2022-05-04 DIAGNOSIS — L82.0 INFLAMED SEBORRHEIC KERATOSIS: ICD-10-CM

## 2022-05-04 DIAGNOSIS — D22 MELANOCYTIC NEVI: ICD-10-CM

## 2022-05-04 PROBLEM — D22.62 MELANOCYTIC NEVI OF LEFT UPPER LIMB, INCLUDING SHOULDER: Status: ACTIVE | Noted: 2022-05-04

## 2022-05-04 PROBLEM — D22.61 MELANOCYTIC NEVI OF RIGHT UPPER LIMB, INCLUDING SHOULDER: Status: ACTIVE | Noted: 2022-05-04

## 2022-05-04 PROBLEM — D22.5 MELANOCYTIC NEVI OF TRUNK: Status: ACTIVE | Noted: 2022-05-04

## 2022-05-04 PROCEDURE — OTHER LIQUID NITROGEN: OTHER

## 2022-05-04 PROCEDURE — OTHER COUNSELING: OTHER

## 2022-05-04 PROCEDURE — OTHER MIPS QUALITY: OTHER

## 2022-05-04 PROCEDURE — 99212 OFFICE O/P EST SF 10 MIN: CPT | Mod: 25

## 2022-05-04 PROCEDURE — 17111 DESTRUCT LESION 15 OR MORE: CPT

## 2022-05-04 ASSESSMENT — LOCATION DETAILED DESCRIPTION DERM
LOCATION DETAILED: LOWER STERNUM
LOCATION DETAILED: SUPERIOR THORACIC SPINE
LOCATION DETAILED: LEFT RIB CAGE
LOCATION DETAILED: LEFT SUPERIOR LATERAL BUCCAL CHEEK
LOCATION DETAILED: RIGHT RIB CAGE
LOCATION DETAILED: LEFT PROXIMAL POSTERIOR UPPER ARM
LOCATION DETAILED: LEFT INFERIOR MEDIAL MIDBACK
LOCATION DETAILED: RIGHT DISTAL POSTERIOR UPPER ARM
LOCATION DETAILED: RIGHT MEDIAL SUPERIOR CHEST
LOCATION DETAILED: MIDDLE STERNUM
LOCATION DETAILED: LEFT CLAVICULAR NECK
LOCATION DETAILED: LEFT INFERIOR LATERAL MALAR CHEEK
LOCATION DETAILED: RIGHT MID-UPPER BACK
LOCATION DETAILED: RIGHT PROXIMAL POSTERIOR UPPER ARM
LOCATION DETAILED: EPIGASTRIC SKIN
LOCATION DETAILED: RIGHT INFERIOR LATERAL NECK
LOCATION DETAILED: LEFT INFERIOR LATERAL NECK
LOCATION DETAILED: LEFT DISTAL POSTERIOR UPPER ARM

## 2022-05-04 ASSESSMENT — LOCATION ZONE DERM
LOCATION ZONE: TRUNK
LOCATION ZONE: NECK
LOCATION ZONE: ARM
LOCATION ZONE: FACE

## 2022-05-04 ASSESSMENT — LOCATION SIMPLE DESCRIPTION DERM
LOCATION SIMPLE: CHEST
LOCATION SIMPLE: UPPER BACK
LOCATION SIMPLE: LEFT CHEEK
LOCATION SIMPLE: RIGHT POSTERIOR UPPER ARM
LOCATION SIMPLE: RIGHT ANTERIOR NECK
LOCATION SIMPLE: ABDOMEN
LOCATION SIMPLE: RIGHT UPPER BACK
LOCATION SIMPLE: LEFT POSTERIOR UPPER ARM
LOCATION SIMPLE: LEFT LOWER BACK
LOCATION SIMPLE: LEFT ANTERIOR NECK

## 2022-05-04 NOTE — PROCEDURE: LIQUID NITROGEN
Show Spray Paint Technique Variable?: Yes
Spray Paint Text: The liquid nitrogen was applied to the skin utilizing a spray paint frosting technique.
Include Z78.9 (Other Specified Conditions Influencing Health Status) As An Associated Diagnosis?: No
Detail Level: Simple
Duration Of Freeze Thaw-Cycle (Seconds): 5
Medical Necessity Clause: This procedure was medically necessary because the lesions that were treated were:
Consent: The patient's consent was obtained including but not limited to risks of crusting, scabbing, blistering, scarring, darker or lighter pigmentary change, recurrence, incomplete removal and infection.
Total Number Of Lesions Treated: 25
Number Of Freeze-Thaw Cycles: 1 freeze-thaw cycle
Post-Care Instructions: I reviewed with the patient in detail post-care instructions. Patient is to wear sunprotection, and avoid picking at any of the treated lesions. Pt may apply Vaseline to crusted or scabbing areas.
Medical Necessity Information: It is in your best interest to select a reason for this procedure from the list below. All of these items fulfill various CMS LCD requirements except the new and changing color options.

## 2022-09-09 ENCOUNTER — ANCILLARY PROCEDURE (OUTPATIENT)
Dept: MAMMOGRAPHY | Facility: CLINIC | Age: 57
End: 2022-09-09
Attending: INTERNAL MEDICINE
Payer: COMMERCIAL

## 2022-09-09 DIAGNOSIS — Z12.31 VISIT FOR SCREENING MAMMOGRAM: ICD-10-CM

## 2022-09-09 PROCEDURE — 77067 SCR MAMMO BI INCL CAD: CPT | Mod: GC | Performed by: RADIOLOGY

## 2022-09-09 PROCEDURE — 77063 BREAST TOMOSYNTHESIS BI: CPT | Mod: GC | Performed by: RADIOLOGY

## 2022-11-21 ENCOUNTER — HEALTH MAINTENANCE LETTER (OUTPATIENT)
Age: 57
End: 2022-11-21

## 2022-12-05 ENCOUNTER — TELEPHONE (OUTPATIENT)
Dept: RHEUMATOLOGY | Facility: CLINIC | Age: 57
End: 2022-12-05

## 2022-12-05 ENCOUNTER — TELEPHONE (OUTPATIENT)
Dept: NEUROLOGY | Facility: CLINIC | Age: 57
End: 2022-12-05

## 2022-12-05 NOTE — TELEPHONE ENCOUNTER
Quoc Polanco,     I would be happy to see her in follow-up if she would like. We can put her on the wait list. We can let her know in the meantime though that we previously did testing for Sjogren's syndrome last year and it was negative. Thanks!    Trenton

## 2022-12-05 NOTE — TELEPHONE ENCOUNTER
[facility] Call Center    Phone Message    May a detailed message be left on voicemail: yes     Reason for Call: Symptoms or Concerns     If patient has red-flag symptoms, warm transfer to triage line    Current symptom or concern: Burning & crawling sensations in feet and legs; plugged salivary glands.    Symptoms have been present for: Patient couldn't answer.     Has patient previously been seen for this? Yes    By Trenton Rodriguez      Are there any new or worsening symptoms? Yes: Patient is experiencing the following symptoms of plugged salivary glands, burning sensations and crawling sensations. Patient states she was in the ER for the salivary glands and isn't getting better. Please call pt back to advise at # [phone]      Action Taken: Message routed to:  [facility] neurology     Travel Screening: Not Applicable

## 2022-12-05 NOTE — TELEPHONE ENCOUNTER
"Spoke to patient.  She is looking for an appointment asap with Dr. An or any rheumatologist to be seen for suspected Sjogren's.  She has had a plugged salivary gland off and on for a while, one side of her mouth will get swollen and painful.  She went to the ER, the sent her to the dentist, they were not sure there was anything they could do about it.     She also has periodic burning in her feet, nerve pain.  She has never tried gabapentin, though she was offered to take this.  She did not want to take it as it was a \"band aide\" and did not treat the problem.    Asked patient to please ask her PCP for a new Rheumatology referral.    Patient states that she will do this and thanked this writer for the time.    Enriqueta Sher RN    "

## 2022-12-05 NOTE — TELEPHONE ENCOUNTER
[facility] Call Center    Phone Message    May a detailed message be left on voicemail: yes     Reason for Call: Appointment Intake        Pt is calling to schedule an apt with Dr. An. Last OV was 04/17/2019. Should the apt be scheduled as new and does the pt need an updated referral? Or is it okay to schedule as return, without up and updated referral? Pt is wanting to be seen asap.  Pt is also requesting a call back from a nurse, she is experiencing some symptoms. Please reach out to pt asap.     Action Taken: Other: [facility]    Travel Screening: Not Applicable

## 2022-12-05 NOTE — TELEPHONE ENCOUNTER
Called pt and she stated she is very concerned that she has Sjogren's syndrome as she has googled it. This past weekend over a course of 15 hours, it has happened 3x where left side of face is painful like it is going to explode. Went to Urgent care and was told plugged salivary gland and to take lemon drops and ibuprofen. Pt stated she is also getting sensations in her teeth and a salty discharge in her mouth after taking the lemon drops.   Pt would like Dr. Rodriguez to order testing specifically blood test and to do a thorough evaluation as she is quite concerned she has Sjogren's. Beginning of pandemic  had salivary gland plugged before but never this scale. Also, having tingling in feet and has had this befroe.  Pt requests a call back.  Informed pt I will forward her message to Dr. Rodriguez and we will let her know what he recommends.

## 2022-12-06 NOTE — TELEPHONE ENCOUNTER
Called pt and informed her Dr. Rodriguez's note verbatim.   I would be happy to see her in follow-up if she would like. We can put her on the wait list. We can let her know in the meantime though that we previously did testing for Sjogren's syndrome last year and it was negative. Thanks!     [place]                        Informed pt she is on the wait list and we will call her when there is an opening and she verbally understood. Advised her to call the scheduling line too.

## 2022-12-14 ENCOUNTER — TELEPHONE (OUTPATIENT)
Dept: NEUROLOGY | Facility: CLINIC | Age: 57
End: 2022-12-14

## 2022-12-14 NOTE — TELEPHONE ENCOUNTER
[facility] Call Center    Phone Message    May a detailed message be left on voicemail: yes     Reason for Call:     Patient called states that she missed 2 calls from a general number of [facility], patient asking if care team had called her for a sooner appointment with ? Writer does not see recent calls for this patient. Patient asking for a call back to confirm       Action Taken: Message routed to:  Adult Clinics: Neurology p [id number]    Travel Screening: Not Applicable

## 2022-12-14 NOTE — TELEPHONE ENCOUNTER
I called patient to inform her that we did not call her to offer sooner appt but to discuss scheduling her a Follow-up with Dr. Rodriguez to have something on the books. Pt advised me that someone already called her to explain that they are looking for a sooner appt for her and she does not need to schedule appt in May.  Annemarie Ramirez LPN    No

## 2022-12-22 ENCOUNTER — APPOINTMENT (OUTPATIENT)
Dept: URBAN - METROPOLITAN AREA CLINIC 257 | Age: 57
Setting detail: DERMATOLOGY
End: 2022-12-22

## 2022-12-22 DIAGNOSIS — L82.0 INFLAMED SEBORRHEIC KERATOSIS: ICD-10-CM

## 2022-12-22 DIAGNOSIS — Z71.89 OTHER SPECIFIED COUNSELING: ICD-10-CM

## 2022-12-22 DIAGNOSIS — L81.1 CHLOASMA: ICD-10-CM

## 2022-12-22 DIAGNOSIS — D22 MELANOCYTIC NEVI: ICD-10-CM

## 2022-12-22 DIAGNOSIS — L57.8 OTHER SKIN CHANGES DUE TO CHRONIC EXPOSURE TO NONIONIZING RADIATION: ICD-10-CM

## 2022-12-22 DIAGNOSIS — L81.4 OTHER MELANIN HYPERPIGMENTATION: ICD-10-CM

## 2022-12-22 DIAGNOSIS — L82.1 OTHER SEBORRHEIC KERATOSIS: ICD-10-CM

## 2022-12-22 DIAGNOSIS — D18.0 HEMANGIOMA: ICD-10-CM

## 2022-12-22 PROBLEM — D18.01 HEMANGIOMA OF SKIN AND SUBCUTANEOUS TISSUE: Status: ACTIVE | Noted: 2022-12-22

## 2022-12-22 PROBLEM — D22.5 MELANOCYTIC NEVI OF TRUNK: Status: ACTIVE | Noted: 2022-12-22

## 2022-12-22 PROCEDURE — OTHER DEFER: OTHER

## 2022-12-22 PROCEDURE — OTHER MIPS QUALITY: OTHER

## 2022-12-22 PROCEDURE — 99213 OFFICE O/P EST LOW 20 MIN: CPT

## 2022-12-22 PROCEDURE — OTHER COUNSELING: OTHER

## 2022-12-22 ASSESSMENT — LOCATION DETAILED DESCRIPTION DERM
LOCATION DETAILED: LEFT CENTRAL MALAR CHEEK
LOCATION DETAILED: RIGHT CENTRAL MALAR CHEEK
LOCATION DETAILED: LEFT INFERIOR MEDIAL UPPER BACK
LOCATION DETAILED: RIGHT SUPERIOR MEDIAL UPPER BACK
LOCATION DETAILED: LEFT RIB CAGE
LOCATION DETAILED: RIGHT RIB CAGE
LOCATION DETAILED: LEFT MEDIAL UPPER BACK
LOCATION DETAILED: INFERIOR THORACIC SPINE

## 2022-12-22 ASSESSMENT — LOCATION SIMPLE DESCRIPTION DERM
LOCATION SIMPLE: UPPER BACK
LOCATION SIMPLE: RIGHT UPPER BACK
LOCATION SIMPLE: LEFT UPPER BACK
LOCATION SIMPLE: ABDOMEN
LOCATION SIMPLE: RIGHT CHEEK
LOCATION SIMPLE: LEFT CHEEK

## 2022-12-22 ASSESSMENT — LOCATION ZONE DERM
LOCATION ZONE: FACE
LOCATION ZONE: TRUNK

## 2022-12-22 NOTE — PROCEDURE: DEFER
Detail Level: Zone
X Size Of Lesion In Cm (Optional): 0
Introduction Text (Please End With A Colon): The following procedure was deferred:
Scheduling Instructions (Optional): 15 min
Procedure To Be Performed At Next Visit: Cryotherapy

## 2023-04-16 ENCOUNTER — HEALTH MAINTENANCE LETTER (OUTPATIENT)
Age: 58
End: 2023-04-16

## 2023-08-29 ENCOUNTER — APPOINTMENT (OUTPATIENT)
Dept: URBAN - METROPOLITAN AREA CLINIC 259 | Age: 58
Setting detail: DERMATOLOGY
End: 2023-08-30

## 2023-08-29 DIAGNOSIS — L82.1 OTHER SEBORRHEIC KERATOSIS: ICD-10-CM

## 2023-08-29 DIAGNOSIS — L81.4 OTHER MELANIN HYPERPIGMENTATION: ICD-10-CM

## 2023-08-29 DIAGNOSIS — L57.8 OTHER SKIN CHANGES DUE TO CHRONIC EXPOSURE TO NONIONIZING RADIATION: ICD-10-CM

## 2023-08-29 PROCEDURE — 99213 OFFICE O/P EST LOW 20 MIN: CPT

## 2023-08-29 PROCEDURE — OTHER COUNSELING: OTHER

## 2023-08-29 PROCEDURE — OTHER MIPS QUALITY: OTHER

## 2023-08-29 ASSESSMENT — LOCATION SIMPLE DESCRIPTION DERM
LOCATION SIMPLE: RIGHT CHEEK
LOCATION SIMPLE: RIGHT TEMPLE
LOCATION SIMPLE: LEFT CHEEK

## 2023-08-29 ASSESSMENT — LOCATION ZONE DERM: LOCATION ZONE: FACE

## 2023-08-29 ASSESSMENT — LOCATION DETAILED DESCRIPTION DERM
LOCATION DETAILED: LEFT INFERIOR CENTRAL MALAR CHEEK
LOCATION DETAILED: RIGHT INFERIOR TEMPLE
LOCATION DETAILED: LEFT CENTRAL MALAR CHEEK
LOCATION DETAILED: RIGHT CENTRAL MALAR CHEEK
LOCATION DETAILED: LEFT MEDIAL MALAR CHEEK

## 2023-08-29 NOTE — HPI: SKIN LESION
Is This A New Presentation, Or A Follow-Up?: Skin Lesion
Which Family Member (Optional)?: Father and grandmother

## 2023-09-28 ENCOUNTER — ANCILLARY PROCEDURE (OUTPATIENT)
Dept: MAMMOGRAPHY | Facility: CLINIC | Age: 58
End: 2023-09-28
Attending: INTERNAL MEDICINE
Payer: COMMERCIAL

## 2023-09-28 DIAGNOSIS — Z12.31 BREAST CANCER SCREENING BY MAMMOGRAM: ICD-10-CM

## 2023-09-28 PROCEDURE — 77063 BREAST TOMOSYNTHESIS BI: CPT | Mod: GC | Performed by: RADIOLOGY

## 2023-09-28 PROCEDURE — 77067 SCR MAMMO BI INCL CAD: CPT | Mod: GC | Performed by: RADIOLOGY

## 2023-11-14 ENCOUNTER — TELEPHONE (OUTPATIENT)
Dept: ENDOCRINOLOGY | Facility: CLINIC | Age: 58
End: 2023-11-14
Payer: COMMERCIAL

## 2023-11-14 NOTE — TELEPHONE ENCOUNTER
I don't see anything on when she should be seen again  and not sure why she waits until the last minute to get in every time. FIrst available is 15 months away and   last seen 7/2021. Do you want to order labs and schedule based off those  ? When do you want her seen ? Suzan Gutierres, RN on 11/14/2023 at 2:12 PM

## 2023-11-14 NOTE — TELEPHONE ENCOUNTER
Health Call Center    Phone Message    May a detailed message be left on voicemail: yes     Reason for Call: Pt has been established with Dr. Fink for several years, last appt was on 7/26/21. Recommendations were for her to return in 1-2 years, but as of right now, first available appt is approximately 15 months away. Pt is overdue for her follow-up, has been experiencing symptoms that are starting to be bothersome, and would really like to discuss her situation with Dr. Fink.   Wondering if it would be possible to fit her in for an appointment? And possibly place orders for lab tests to ensure her levels are where they need to be?   Would prefer to be seen in-person, but would be ok with virtual as well.     Action Taken: Other: Endo    Travel Screening: Not Applicable

## 2023-11-15 NOTE — TELEPHONE ENCOUNTER
I see nothing is scheduled.   Help her schedule first available and get on fast pass.   We have short notice openings all the time, as you know .  Cierra Fink MD

## 2023-11-15 NOTE — TELEPHONE ENCOUNTER
My chart sent to patient to schedule and be put on fast pass with recommendation to schedule far in advance for future visits. Suzan Gutierres RN on 11/15/2023 at 8:57 AM

## 2023-12-12 ENCOUNTER — TELEPHONE (OUTPATIENT)
Dept: ENDOCRINOLOGY | Facility: CLINIC | Age: 58
End: 2023-12-12
Payer: COMMERCIAL

## 2023-12-12 NOTE — TELEPHONE ENCOUNTER
TALKED TO PT AND  RESCHEDULED  HER 01/16/2024 FOR 02/19/2024  VIRTUAL   Kendra Contreras on 12/12/2023 at 3:27 PM

## 2023-12-13 ENCOUNTER — TRANSFERRED RECORDS (OUTPATIENT)
Dept: HEALTH INFORMATION MANAGEMENT | Facility: CLINIC | Age: 58
End: 2023-12-13
Payer: COMMERCIAL

## 2023-12-14 ENCOUNTER — TRANSCRIBE ORDERS (OUTPATIENT)
Dept: OTHER | Age: 58
End: 2023-12-14

## 2023-12-14 ENCOUNTER — MEDICAL CORRESPONDENCE (OUTPATIENT)
Dept: HEALTH INFORMATION MANAGEMENT | Facility: CLINIC | Age: 58
End: 2023-12-14
Payer: COMMERCIAL

## 2023-12-14 DIAGNOSIS — E04.9 ENLARGED THYROID GLAND: Primary | ICD-10-CM

## 2023-12-18 ENCOUNTER — APPOINTMENT (OUTPATIENT)
Dept: URBAN - METROPOLITAN AREA CLINIC 259 | Age: 58
Setting detail: DERMATOLOGY
End: 2023-12-19

## 2023-12-18 DIAGNOSIS — L91.8 OTHER HYPERTROPHIC DISORDERS OF THE SKIN: ICD-10-CM

## 2023-12-18 DIAGNOSIS — L81.4 OTHER MELANIN HYPERPIGMENTATION: ICD-10-CM

## 2023-12-18 DIAGNOSIS — Z71.89 OTHER SPECIFIED COUNSELING: ICD-10-CM

## 2023-12-18 DIAGNOSIS — D22 MELANOCYTIC NEVI: ICD-10-CM

## 2023-12-18 DIAGNOSIS — D18.0 HEMANGIOMA: ICD-10-CM

## 2023-12-18 DIAGNOSIS — L70.0 ACNE VULGARIS: ICD-10-CM

## 2023-12-18 DIAGNOSIS — L57.8 OTHER SKIN CHANGES DUE TO CHRONIC EXPOSURE TO NONIONIZING RADIATION: ICD-10-CM

## 2023-12-18 DIAGNOSIS — L82.1 OTHER SEBORRHEIC KERATOSIS: ICD-10-CM

## 2023-12-18 PROBLEM — D18.01 HEMANGIOMA OF SKIN AND SUBCUTANEOUS TISSUE: Status: ACTIVE | Noted: 2023-12-18

## 2023-12-18 PROBLEM — D22.5 MELANOCYTIC NEVI OF TRUNK: Status: ACTIVE | Noted: 2023-12-18

## 2023-12-18 PROCEDURE — 99213 OFFICE O/P EST LOW 20 MIN: CPT

## 2023-12-18 PROCEDURE — OTHER MIPS QUALITY: OTHER

## 2023-12-18 PROCEDURE — OTHER COUNSELING: OTHER

## 2023-12-18 ASSESSMENT — LOCATION DETAILED DESCRIPTION DERM
LOCATION DETAILED: LEFT MEDIAL UPPER BACK
LOCATION DETAILED: LEFT AXILLARY VAULT
LOCATION DETAILED: LEFT SUPERIOR MEDIAL UPPER BACK
LOCATION DETAILED: RIGHT CAVUM CONCHA

## 2023-12-18 ASSESSMENT — LOCATION SIMPLE DESCRIPTION DERM
LOCATION SIMPLE: LEFT UPPER BACK
LOCATION SIMPLE: RIGHT EAR
LOCATION SIMPLE: LEFT AXILLARY VAULT

## 2023-12-18 ASSESSMENT — LOCATION ZONE DERM
LOCATION ZONE: TRUNK
LOCATION ZONE: AXILLAE
LOCATION ZONE: EAR

## 2023-12-18 NOTE — HPI: FULL BODY SKIN EXAMINATION
How Severe Are Your Spot(S)?: mild
What Type Of Note Output Would You Prefer (Optional)?: Standard Output
What Is The Reason For Today's Visit?: Full Body Skin Examination
What Is The Reason For Today's Visit? (Being Monitored For X): concerning skin lesions on an annual basis
Additional History: Patient reports noticing lesion on right hand and face she is seeking further evaluation.

## 2023-12-18 NOTE — PROCEDURE: COUNSELING
Detail Level: Generalized
Patient Specific Counseling (Will Not Stick From Patient To Patient): *** patient has lesion of concern on right hand. Reassured these are benign lesions. However, if irritating can be treated with LN2.
Detail Level: Zone
Bactrim Pregnancy And Lactation Text: This medication is Pregnancy Category D and is known to cause fetal risk.  It is also excreted in breast milk.
Erythromycin Counseling:  I discussed with the patient the risks of erythromycin including but not limited to GI upset, allergic reaction, drug rash, diarrhea, increase in liver enzymes, and yeast infections.
Minocycline Pregnancy And Lactation Text: This medication is Pregnancy Category D and not consider safe during pregnancy. It is also excreted in breast milk.
Aklief counseling:  Patient advised to apply a pea-sized amount only at bedtime and wait 30 minutes after washing their face before applying.  If too drying, patient may add a non-comedogenic moisturizer.  The most commonly reported side effects including irritation, redness, scaling, dryness, stinging, burning, itching, and increased risk of sunburn.  The patient verbalized understanding of the proper use and possible adverse effects of retinoids.  All of the patient's questions and concerns were addressed.
Winlevi Counseling:  I discussed with the patient the risks of topical clascoterone including but not limited to erythema, scaling, itching, and stinging. Patient voiced their understanding.
Topical Retinoid Pregnancy And Lactation Text: This medication is Pregnancy Category C. It is unknown if this medication is excreted in breast milk.
Azelaic Acid Counseling: Patient counseled that medicine may cause skin irritation and to avoid applying near the eyes.  In the event of skin irritation, the patient was advised to reduce the amount of the drug applied or use it less frequently.   The patient verbalized understanding of the proper use and possible adverse effects of azelaic acid.  All of the patient's questions and concerns were addressed.
Birth Control Pills Pregnancy And Lactation Text: This medication should be avoided if pregnant and for the first 30 days post-partum.
Isotretinoin Counseling: Patient should get monthly blood tests, not donate blood, not drive at night if vision affected, not share medication, and not undergo elective surgery for 6 months after tx completed. Side effects reviewed, pt to contact office should one occur.
Spironolactone Counseling: Patient advised regarding risks of diarrhea, abdominal pain, hyperkalemia, birth defects (for female patients), liver toxicity and renal toxicity. The patient may need blood work to monitor liver and kidney function and potassium levels while on therapy. The patient verbalized understanding of the proper use and possible adverse effects of spironolactone.  All of the patient's questions and concerns were addressed.
Azelaic Acid Pregnancy And Lactation Text: This medication is considered safe during pregnancy and breast feeding.
Dapsone Counseling: I discussed with the patient the risks of dapsone including but not limited to hemolytic anemia, agranulocytosis, rashes, methemoglobinemia, kidney failure, peripheral neuropathy, headaches, GI upset, and liver toxicity.  Patients who start dapsone require monitoring including baseline LFTs and weekly CBCs for the first month, then every month thereafter.  The patient verbalized understanding of the proper use and possible adverse effects of dapsone.  All of the patient's questions and concerns were addressed.
Isotretinoin Pregnancy And Lactation Text: This medication is Pregnancy Category X and is considered extremely dangerous during pregnancy. It is unknown if it is excreted in breast milk.
Use Enhanced Medication Counseling?: No
Topical Clindamycin Counseling: Patient counseled that this medication may cause skin irritation or allergic reactions.  In the event of skin irritation, the patient was advised to reduce the amount of the drug applied or use it less frequently.   The patient verbalized understanding of the proper use and possible adverse effects of clindamycin.  All of the patient's questions and concerns were addressed.
Topical Sulfur Applications Counseling: Topical Sulfur Counseling: Patient counseled that this medication may cause skin irritation or allergic reactions.  In the event of skin irritation, the patient was advised to reduce the amount of the drug applied or use it less frequently.   The patient verbalized understanding of the proper use and possible adverse effects of topical sulfur application.  All of the patient's questions and concerns were addressed.
Azithromycin Pregnancy And Lactation Text: This medication is considered safe during pregnancy and is also secreted in breast milk.
High Dose Vitamin A Pregnancy And Lactation Text: High dose vitamin A therapy is contraindicated during pregnancy and breast feeding.
Tetracycline Counseling: Patient counseled regarding possible photosensitivity and increased risk for sunburn.  Patient instructed to avoid sunlight, if possible.  When exposed to sunlight, patients should wear protective clothing, sunglasses, and sunscreen.  The patient was instructed to call the office immediately if the following severe adverse effects occur:  hearing changes, easy bruising/bleeding, severe headache, or vision changes.  The patient verbalized understanding of the proper use and possible adverse effects of tetracycline.  All of the patient's questions and concerns were addressed. Patient understands to avoid pregnancy while on therapy due to potential birth defects.
Doxycycline Counseling:  Patient counseled regarding possible photosensitivity and increased risk for sunburn.  Patient instructed to avoid sunlight, if possible.  When exposed to sunlight, patients should wear protective clothing, sunglasses, and sunscreen.  The patient was instructed to call the office immediately if the following severe adverse effects occur:  hearing changes, easy bruising/bleeding, severe headache, or vision changes.  The patient verbalized understanding of the proper use and possible adverse effects of doxycycline.  All of the patient's questions and concerns were addressed.
Benzoyl Peroxide Pregnancy And Lactation Text: This medication is Pregnancy Category C. It is unknown if benzoyl peroxide is excreted in breast milk.
Minocycline Counseling: Patient advised regarding possible photosensitivity and discoloration of the teeth, skin, lips, tongue and gums.  Patient instructed to avoid sunlight, if possible.  When exposed to sunlight, patients should wear protective clothing, sunglasses, and sunscreen.  The patient was instructed to call the office immediately if the following severe adverse effects occur:  hearing changes, easy bruising/bleeding, severe headache, or vision changes.  The patient verbalized understanding of the proper use and possible adverse effects of minocycline.  All of the patient's questions and concerns were addressed.
Doxycycline Pregnancy And Lactation Text: This medication is Pregnancy Category D and not consider safe during pregnancy. It is also excreted in breast milk but is considered safe for shorter treatment courses.
Topical Sulfur Applications Pregnancy And Lactation Text: This medication is Pregnancy Category C and has an unknown safety profile during pregnancy. It is unknown if this topical medication is excreted in breast milk.
Topical Retinoid counseling:  Patient advised to apply a pea-sized amount only at bedtime and wait 30 minutes after washing their face before applying.  If too drying, patient may add a non-comedogenic moisturizer. The patient verbalized understanding of the proper use and possible adverse effects of retinoids.  All of the patient's questions and concerns were addressed.
Birth Control Pills Counseling: Birth Control Pill Counseling: I discussed with the patient the potential side effects of OCPs including but not limited to increased risk of stroke, heart attack, thrombophlebitis, deep venous thrombosis, hepatic adenomas, breast changes, GI upset, headaches, and depression.  The patient verbalized understanding of the proper use and possible adverse effects of OCPs. All of the patient's questions and concerns were addressed.
Erythromycin Pregnancy And Lactation Text: This medication is Pregnancy Category B and is considered safe during pregnancy. It is also excreted in breast milk.
Sarecycline Counseling: Patient advised regarding possible photosensitivity and discoloration of the teeth, skin, lips, tongue and gums.  Patient instructed to avoid sunlight, if possible.  When exposed to sunlight, patients should wear protective clothing, sunglasses, and sunscreen.  The patient was instructed to call the office immediately if the following severe adverse effects occur:  hearing changes, easy bruising/bleeding, severe headache, or vision changes.  The patient verbalized understanding of the proper use and possible adverse effects of sarecycline.  All of the patient's questions and concerns were addressed.
Winlevi Pregnancy And Lactation Text: This medication is considered safe during pregnancy and breastfeeding.
Aklief Pregnancy And Lactation Text: It is unknown if this medication is safe to use during pregnancy.  It is unknown if this medication is excreted in breast milk.  Breastfeeding women should use the topical cream on the smallest area of the skin for the shortest time needed while breastfeeding.  Do not apply to nipple and areola.
Tazorac Counseling:  Patient advised that medication is irritating and drying.  Patient may need to apply sparingly and wash off after an hour before eventually leaving it on overnight.  The patient verbalized understanding of the proper use and possible adverse effects of tazorac.  All of the patient's questions and concerns were addressed.
Tazorac Pregnancy And Lactation Text: This medication is not safe during pregnancy. It is unknown if this medication is excreted in breast milk.
Azithromycin Counseling:  I discussed with the patient the risks of azithromycin including but not limited to GI upset, allergic reaction, drug rash, diarrhea, and yeast infections.
Dapsone Pregnancy And Lactation Text: This medication is Pregnancy Category C and is not considered safe during pregnancy or breast feeding.
Benzoyl Peroxide Counseling: Patient counseled that medicine may cause skin irritation and bleach clothing.  In the event of skin irritation, the patient was advised to reduce the amount of the drug applied or use it less frequently.   The patient verbalized understanding of the proper use and possible adverse effects of benzoyl peroxide.  All of the patient's questions and concerns were addressed.
Topical Clindamycin Pregnancy And Lactation Text: This medication is Pregnancy Category B and is considered safe during pregnancy. It is unknown if it is excreted in breast milk.
High Dose Vitamin A Counseling: Side effects reviewed, pt to contact office should one occur.
Spironolactone Pregnancy And Lactation Text: This medication can cause feminization of the male fetus and should be avoided during pregnancy. The active metabolite is also found in breast milk.
Bactrim Counseling:  I discussed with the patient the risks of sulfa antibiotics including but not limited to GI upset, allergic reaction, drug rash, diarrhea, dizziness, photosensitivity, and yeast infections.  Rarely, more serious reactions can occur including but not limited to aplastic anemia, agranulocytosis, methemoglobinemia, blood dyscrasias, liver or kidney failure, lung infiltrates or desquamative/blistering drug rashes.

## 2023-12-20 ENCOUNTER — OFFICE VISIT (OUTPATIENT)
Dept: NEUROLOGY | Facility: CLINIC | Age: 58
End: 2023-12-20
Payer: COMMERCIAL

## 2023-12-20 VITALS
WEIGHT: 136 LBS | SYSTOLIC BLOOD PRESSURE: 127 MMHG | HEIGHT: 64 IN | HEART RATE: 69 BPM | DIASTOLIC BLOOD PRESSURE: 73 MMHG | BODY MASS INDEX: 23.22 KG/M2

## 2023-12-20 DIAGNOSIS — R51.9 NEW ONSET HEADACHE: Primary | ICD-10-CM

## 2023-12-20 DIAGNOSIS — R20.9 DISTURBANCE OF SKIN SENSATION: ICD-10-CM

## 2023-12-20 PROCEDURE — 99214 OFFICE O/P EST MOD 30 MIN: CPT | Performed by: INTERNAL MEDICINE

## 2023-12-20 NOTE — PROGRESS NOTES
[facility] Neurology Follow Up Visit    Carolyn Briones MRN# [id number]   Age: 58 year old YOB: 1965     Brief history of symptoms: The patient was initially seen in neurologic consultation on 3/12/2021 for evaluation of parasthesias. Please see the comprehensive neurologic consultation note from that date in the Epic records for details.     Interval history:   Patient is experiencing more cold sensations in the feet. It can go from really cold to burning sensations. It is in the feet and then extends up.     She feels it in the fingers within the last few days.     Symptoms are intermittent. They are not painful, but is uncomfortable.     She frequently feels a pressure sensation on the left side of the head. This happens about once a week. It is not bothersome enough to take a medication. This has been going on for a few month. She doesn't notice any neck or upper back issues. Her vision got worse.     She has issues with her left salivary gland getting clogged. She had a procedure to rinse it out and she hasn't had any additional episodes.     She has issues with sinusitis.       Past Medical History:     Patient Active Problem List   Diagnosis    Anemia, iron deficiency    CARDIOVASCULAR SCREENING; LDL GOAL LESS THAN 160    Hand pain    Vitamin D deficiency    Female stress incontinence    Iron deficiency anemia due to chronic blood loss    Perimenopause    Stress at home    Thyroid nodule    Neck pain    Empty sella (H24)    Other fatigue    Disturbance of skin sensation    Dorsocervical fat pad    High serum cortisol     Past Medical History:   Diagnosis Date    Anemia     Empty sella (H)     only partially empty; possibly normal vaiant    Exposure to other ionizing radiation, subsequent encounter 1986    [place]    GERD (gastroesophageal reflux disease)     Hypercholesterolemia     Hypertension     Iron deficiency     Multiple thyroid nodules     subcm        Past Surgical History:     Past  Surgical History:   Procedure Laterality Date    APPENDECTOMY      COLONOSCOPY      DILATION AND CURETTAGE SUCTION  11/13/2012    Procedure: DILATION AND CURETTAGE SUCTION;  Suction Dilation & Curettage    (8 weeks);  Surgeon: Ken Rose MD;  Location: [facility] OR    EXCISE GANGLION WRIST  4/10/2014    Procedure: EXCISE GANGLION WRIST;  Excision Ganglion Cyst, Left Wrist;  Surgeon: Ashley Garcia MD;  Location: [facility]    OPERATIVE HYSTEROSCOPY WITH MORCELLATOR  1/19/2012    Procedure:OPERATIVE HYSTEROSCOPY WITH MORCELLATOR; Hysteroscopy & Polypectomy With Morcellator; Surgeon:KEN ROSE; Location:[facility] OR        Social History:     Social History     Tobacco Use    Smoking status: Never    Smokeless tobacco: Never   Substance Use Topics    Alcohol use: No    Drug use: No        Family History:     Family History   Problem Relation Age of Onset    Lipids Mother     Cancer Mother         desmoplastic mesothelioma    C.A.D. Father         CABG age 65    Thyroid Disease No family hx of     Thyroid Cancer No family hx of         Medications:     Current Outpatient Medications   Medication Sig    aspirin (ASA) 81 MG EC tablet Take 81 mg by mouth    atorvastatin (LIPITOR) 20 MG tablet Take 20 mg by mouth daily     famotidine (PEPCID) 40 MG tablet Take 40 mg by mouth    ibuprofen (ADVIL/MOTRIN) 600 MG tablet TAKE ONE TABLET BY MOUTH EVERY SIX HOURS AS NEEDED (Patient not taking: Reported on 2/7/2022)    LORazepam (ATIVAN) 0.5 MG tablet Take 0.5 mg by mouth as needed    metoprolol (TOPROL-XL) 25 MG 24 hr tablet     metoprolol succinate ER (TOPROL-XL) 25 MG 24 hr tablet TAKE ONE TABLET BY MOUTH ONE TIME DAILY    oxyCODONE (ROXICODONE) 5 MG tablet Take 5-10 mg by mouth (Patient not taking: Reported on 1/12/2022)     No current facility-administered medications for this visit.        Allergies:     Allergies   Allergen Reactions    Iron Dextran Shortness Of Breath    Doxycycline Other (See Comments)     High blood pressure  "   Cipro [Ciprofloxacin] Nausea    Liquid Adhesive Itching    Sulphadimidine [Sulfa Antibiotics] Nausea    Adhesive Tape Rash        Review of Systems:   As above     Physical Exam:   Vitals: /73   Pulse 69   Ht 1.613 m (5' 3.5\")   Wt 61.7 kg (136 lb)   LMP 10/04/2013   BMI 23.71 kg/m     General: Seated comfortably in no acute distress.  Lungs: breathing comfortably  Neurologic:     Mental Status: Fully alert, attentive. Language normal, speech clear and fluent, no paraphasic errors.     Cranial Nerves: Visual fields intact. EOMI with normal smooth pursuit. Facial sensation intact/symmetric. Facial movements symmetric. Hearing not formally tested but intact to conversation. Palate elevation symmetric, uvula midline. No dysarthria. Shoulder shrug strong bilaterally. Tongue protrusion midline.     Motor: No tremors or other abnormal movements observed. Muscle tone normal throughout. Strength 5/5 throughout upper and lower extremities.     Deep Tendon Reflexes: 2+/symmetric throughout upper and lower extremities.      Sensory: Intact/symmetric to light touch throughout upper and lower extremities with exception of possible mild tingling in the tops of the feet. Temperature, vibration, and proprioception are intact throughout. Negative Romberg.      Coordination: Finger-nose-finger and heel-shin intact without dysmetria.     Gait: Normal, steady casual gait. Able to walk on toes, heels and tandem without difficulty.     Data reviewed on previous visits    Imaging:  MRI lumbar spine 1/13/2021       MRI thoracic 1/7/2021       MRI cervical 1/7/2021       MRI orbits 1/7/2021       MRI brain 1/7/2021       Procedures:  EMG 3 weeks ago at [facility] reportedly normal  Labs 3/2021- unrevealing A1c, B12, B6, ELP, ESR, CRP, YULY, SSA/SSB, TTG Ab, TSH / Possible very small monoclonal IgG immunoglobulin of lambda light on immunofixation  chain type    Pertinent Investigations since last visit:   None         Assessment and " Plan:   Assessment:  Carolyn Briones is a 55 year old female who presents today for follow-up of abnormal sensations and new symptom of left sided head pressure. Given new onset headache, MRI brain was ordered to evaluate for secondary causes. Prior work-up with MRI brain, cervical, thoracic, and lumbar and EMG did not show an etiology of abnormal sensations. She would like to proceed with skin biopsy to assess for small fiber neuropathy.     Plan:  - Skin biopsy  - MRI brain without contrast    Follow up in Neurology clinic pending above    Trenton Rodriguez MD   of Neurology  [facility]

## 2023-12-20 NOTE — LETTER
12/20/2023         RE: Carolyn Briones  [address]  [facility] [address]        Dear Colleague,    Thank you for referring your patient, Carolyn Briones, to the [facility] [place]. Please see a copy of my visit note below.    [facility] Neurology Follow Up Visit    Carolyn Briones MRN# [id number]   Age: 58 year old YOB: 1965     Brief history of symptoms: The patient was initially seen in neurologic consultation on 3/12/2021 for evaluation of parasthesias. Please see the comprehensive neurologic consultation note from that date in the Epic records for details.     Interval history:   Patient is experiencing more cold sensations in the feet. It can go from really cold to burning sensations. It is in the feet and then extends up.     She feels it in the fingers within the last few days.     Symptoms are intermittent. They are not painful, but is uncomfortable.     She frequently feels a pressure sensation on the left side of the head. This happens about once a week. It is not bothersome enough to take a medication. This has been going on for a few month. She doesn't notice any neck or upper back issues. Her vision got worse.     She has issues with her left salivary gland getting clogged. She had a procedure to rinse it out and she hasn't had any additional episodes.     She has issues with sinusitis.       Past Medical History:     Patient Active Problem List   Diagnosis     Anemia, iron deficiency     CARDIOVASCULAR SCREENING; LDL GOAL LESS THAN 160     Hand pain     Vitamin D deficiency     Female stress incontinence     Iron deficiency anemia due to chronic blood loss     Perimenopause     Stress at home     Thyroid nodule     Neck pain     Empty sella (H24)     Other fatigue     Disturbance of skin sensation     Dorsocervical fat pad     High serum cortisol     Past Medical History:   Diagnosis Date     Anemia      Empty sella (H)     only partially empty; possibly  normal vaiant     Exposure to other ionizing radiation, subsequent encounter 1986    [place]     GERD (gastroesophageal reflux disease)      Hypercholesterolemia      Hypertension      Iron deficiency      Multiple thyroid nodules     subcm        Past Surgical History:     Past Surgical History:   Procedure Laterality Date     APPENDECTOMY       COLONOSCOPY       DILATION AND CURETTAGE SUCTION  11/13/2012    Procedure: DILATION AND CURETTAGE SUCTION;  Suction Dilation & Curettage    (8 weeks);  Surgeon: Ken Rose MD;  Location: [facility] OR     EXCISE GANGLION WRIST  4/10/2014    Procedure: EXCISE GANGLION WRIST;  Excision Ganglion Cyst, Left Wrist;  Surgeon: Ashley Garcia MD;  Location: [facility]     OPERATIVE HYSTEROSCOPY WITH MORCELLATOR  1/19/2012    Procedure:OPERATIVE HYSTEROSCOPY WITH MORCELLATOR; Hysteroscopy & Polypectomy With Morcellator; Surgeon:KEN ROSE; Location:[facility] OR        Social History:     Social History     Tobacco Use     Smoking status: Never     Smokeless tobacco: Never   Substance Use Topics     Alcohol use: No     Drug use: No        Family History:     Family History   Problem Relation Age of Onset     Lipids Mother      Cancer Mother         desmoplastic mesothelioma     C.A.D. Father         CABG age 65     Thyroid Disease No family hx of      Thyroid Cancer No family hx of         Medications:     Current Outpatient Medications   Medication Sig     aspirin (ASA) 81 MG EC tablet Take 81 mg by mouth     atorvastatin (LIPITOR) 20 MG tablet Take 20 mg by mouth daily      famotidine (PEPCID) 40 MG tablet Take 40 mg by mouth     ibuprofen (ADVIL/MOTRIN) 600 MG tablet TAKE ONE TABLET BY MOUTH EVERY SIX HOURS AS NEEDED (Patient not taking: Reported on 2/7/2022)     LORazepam (ATIVAN) 0.5 MG tablet Take 0.5 mg by mouth as needed     metoprolol (TOPROL-XL) 25 MG 24 hr tablet      metoprolol succinate ER (TOPROL-XL) 25 MG 24 hr tablet TAKE ONE TABLET BY MOUTH ONE TIME DAILY     oxyCODONE  "(ROXICODONE) 5 MG tablet Take 5-10 mg by mouth (Patient not taking: Reported on 1/12/2022)     No current facility-administered medications for this visit.        Allergies:     Allergies   Allergen Reactions     Iron Dextran Shortness Of Breath     Doxycycline Other (See Comments)     High blood pressure     Cipro [Ciprofloxacin] Nausea     Liquid Adhesive Itching     Sulphadimidine [Sulfa Antibiotics] Nausea     Adhesive Tape Rash        Review of Systems:   As above     Physical Exam:   Vitals: /73   Pulse 69   Ht 1.613 m (5' 3.5\")   Wt 61.7 kg (136 lb)   LMP 10/04/2013   BMI 23.71 kg/m     General: Seated comfortably in no acute distress.  Lungs: breathing comfortably  Neurologic:     Mental Status: Fully alert, attentive. Language normal, speech clear and fluent, no paraphasic errors.     Cranial Nerves: Visual fields intact. EOMI with normal smooth pursuit. Facial sensation intact/symmetric. Facial movements symmetric. Hearing not formally tested but intact to conversation. Palate elevation symmetric, uvula midline. No dysarthria. Shoulder shrug strong bilaterally. Tongue protrusion midline.     Motor: No tremors or other abnormal movements observed. Muscle tone normal throughout. Strength 5/5 throughout upper and lower extremities.     Deep Tendon Reflexes: 2+/symmetric throughout upper and lower extremities.      Sensory: Intact/symmetric to light touch throughout upper and lower extremities with exception of possible mild tingling in the tops of the feet. Temperature, vibration, and proprioception are intact throughout. Negative Romberg.      Coordination: Finger-nose-finger and heel-shin intact without dysmetria.     Gait: Normal, steady casual gait. Able to walk on toes, heels and tandem without difficulty.     Data reviewed on previous visits    Imaging:  MRI lumbar spine 1/13/2021       MRI thoracic 1/7/2021       MRI cervical 1/7/2021       MRI orbits 1/7/2021       MRI brain 1/7/2021     "   Procedures:  EMG 3 weeks ago at [facility] reportedly normal  Labs 3/2021- unrevealing A1c, B12, B6, ELP, ESR, CRP, YULY, SSA/SSB, TTG Ab, TSH / Possible very small monoclonal IgG immunoglobulin of lambda light on immunofixation  chain type    Pertinent Investigations since last visit:   None         Assessment and Plan:   Assessment:  Carolyn Briones is a 55 year old female who presents today for follow-up of abnormal sensations and new symptom of left sided head pressure. Given new onset headache, MRI brain was ordered to evaluate for secondary causes. Prior work-up with MRI brain, cervical, thoracic, and lumbar and EMG did not show an etiology of abnormal sensations. She would like to proceed with skin biopsy to assess for small fiber neuropathy.     Plan:  - Skin biopsy  - MRI brain without contrast    Follow up in Neurology clinic pending above    Trenton Rodriguez MD   of Neurology  [facility]      Again, thank you for allowing me to participate in the care of your patient.        Sincerely,        Trenton Rodriguez MD

## 2023-12-26 ENCOUNTER — TELEPHONE (OUTPATIENT)
Dept: ENDOCRINOLOGY | Facility: CLINIC | Age: 58
End: 2023-12-26
Payer: COMMERCIAL

## 2023-12-26 NOTE — TELEPHONE ENCOUNTER
Pt is rescheduled for 01/31  at 2 pm in person with dr. Fink  and sent message to  nurse justin Contreras on 12/26/2023 at 1:42 PM

## 2023-12-29 ENCOUNTER — TRANSFERRED RECORDS (OUTPATIENT)
Dept: HEALTH INFORMATION MANAGEMENT | Facility: CLINIC | Age: 58
End: 2023-12-29
Payer: COMMERCIAL

## 2024-01-02 ENCOUNTER — TRANSCRIBE ORDERS (OUTPATIENT)
Dept: OTHER | Age: 59
End: 2024-01-02

## 2024-01-02 ENCOUNTER — MEDICAL CORRESPONDENCE (OUTPATIENT)
Dept: HEALTH INFORMATION MANAGEMENT | Facility: CLINIC | Age: 59
End: 2024-01-02
Payer: COMMERCIAL

## 2024-01-02 DIAGNOSIS — G62.9 NEUROPATHY: Primary | ICD-10-CM

## 2024-01-03 ENCOUNTER — TRANSCRIBE ORDERS (OUTPATIENT)
Dept: CALL CENTER | Age: 59
End: 2024-01-03
Payer: COMMERCIAL

## 2024-01-03 DIAGNOSIS — R69 DIAGNOSIS UNKNOWN: Primary | ICD-10-CM

## 2024-01-17 ENCOUNTER — ANCILLARY PROCEDURE (OUTPATIENT)
Dept: MRI IMAGING | Facility: CLINIC | Age: 59
End: 2024-01-17
Attending: INTERNAL MEDICINE
Payer: COMMERCIAL

## 2024-01-17 DIAGNOSIS — R51.9 NEW ONSET HEADACHE: ICD-10-CM

## 2024-01-17 PROCEDURE — 70551 MRI BRAIN STEM W/O DYE: CPT | Mod: GC | Performed by: RADIOLOGY

## 2024-01-30 ENCOUNTER — TELEPHONE (OUTPATIENT)
Dept: NEUROSURGERY | Facility: CLINIC | Age: 59
End: 2024-01-30
Payer: COMMERCIAL

## 2024-01-30 NOTE — TELEPHONE ENCOUNTER
I spoke with Dr. Cruz and he stated pt is fine to be schedule fist available Follow-up and put on cancel list. I called patient  and reassured her and scheduled next available Follow-up on  02/15/2024

## 2024-01-30 NOTE — TELEPHONE ENCOUNTER
I read the information below to patient and patient stated she wants a Follow-up appt to discuss calcification on Brain right away.  Pt also states she is very worried and can't think now. I asked patient if I could speak with the Doctor about getting her an appt and call her back, Pt agreed to plan.

## 2024-01-30 NOTE — TELEPHONE ENCOUNTER
Zelalem Leon,     Your MRI doesn't show any concerning findings, which is reassuring. There is a calcification in part of the brain, which appears benign. However I think it would be reasonable to reach back out to us in about a year to repeat another MRI brain scan, just to make sure and follow-up it up.     Let us know if you have any questions about this.     Regards,  Trenton Rodriguez MD

## 2024-01-31 ENCOUNTER — LAB (OUTPATIENT)
Dept: LAB | Facility: CLINIC | Age: 59
End: 2024-01-31
Payer: COMMERCIAL

## 2024-01-31 ENCOUNTER — OFFICE VISIT (OUTPATIENT)
Dept: ENDOCRINOLOGY | Facility: CLINIC | Age: 59
End: 2024-01-31
Attending: INTERNAL MEDICINE
Payer: COMMERCIAL

## 2024-01-31 VITALS
SYSTOLIC BLOOD PRESSURE: 108 MMHG | BODY MASS INDEX: 26.9 KG/M2 | HEART RATE: 74 BPM | DIASTOLIC BLOOD PRESSURE: 68 MMHG | OXYGEN SATURATION: 94 % | WEIGHT: 137 LBS | HEIGHT: 60 IN

## 2024-01-31 DIAGNOSIS — R45.89 ANXIETY ABOUT HEALTH: ICD-10-CM

## 2024-01-31 DIAGNOSIS — Z78.0 POST-MENOPAUSAL: ICD-10-CM

## 2024-01-31 DIAGNOSIS — E23.6 EMPTY SELLA (H): ICD-10-CM

## 2024-01-31 DIAGNOSIS — E65 DORSOCERVICAL FAT PAD: ICD-10-CM

## 2024-01-31 DIAGNOSIS — E55.9 HYPOVITAMINOSIS D: ICD-10-CM

## 2024-01-31 DIAGNOSIS — E04.1 THYROID NODULE: ICD-10-CM

## 2024-01-31 DIAGNOSIS — E23.6 EMPTY SELLA (H): Primary | ICD-10-CM

## 2024-01-31 PROCEDURE — 86800 THYROGLOBULIN ANTIBODY: CPT

## 2024-01-31 PROCEDURE — 99417 PROLNG OP E/M EACH 15 MIN: CPT

## 2024-01-31 PROCEDURE — 36415 COLL VENOUS BLD VENIPUNCTURE: CPT | Performed by: PATHOLOGY

## 2024-01-31 PROCEDURE — 99215 OFFICE O/P EST HI 40 MIN: CPT

## 2024-01-31 RX ORDER — CHOLECALCIFEROL (VITAMIN D3) 50 MCG
1 TABLET ORAL DAILY
Qty: 90 TABLET | Refills: 4 | Status: SHIPPED | OUTPATIENT
Start: 2024-01-31

## 2024-01-31 ASSESSMENT — PAIN SCALES - GENERAL: PAINLEVEL: NO PAIN (0)

## 2024-01-31 NOTE — PATIENT INSTRUCTIONS
You had normal thyroid blood level 12/2023    Add vitamin D3 2000 international unit(s)/day (50 mcg/day ).     Midnight salivary cortisol at your convenience.  Make sure you are in normal circadian rhythm, not too close to travel across time zones.   Labs today    Thyroid US at your earliest convenience   No need to repeat pituitary MRI for diagnosis partially empty sella    Please call and schedule at one of these locations that provide the service you need.     DEXA, CT, MRI, US, XRAY    [facility] [place]   ([facility], [facility] [place]) [phone]   [facility] ([place], [place]) [phone]   [facility] ([facility]) [phone]   [facility] ([facility]) [phone]
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
## 2024-02-08 LAB — SCANNED LAB RESULT: NORMAL

## 2024-02-15 ENCOUNTER — OFFICE VISIT (OUTPATIENT)
Dept: NEUROLOGY | Facility: CLINIC | Age: 59
End: 2024-02-15
Payer: COMMERCIAL

## 2024-02-15 VITALS
HEIGHT: 64 IN | DIASTOLIC BLOOD PRESSURE: 77 MMHG | WEIGHT: 137 LBS | BODY MASS INDEX: 23.39 KG/M2 | SYSTOLIC BLOOD PRESSURE: 111 MMHG | HEART RATE: 62 BPM

## 2024-02-15 DIAGNOSIS — G93.89 CALCIFICATION OF BRAIN: Primary | ICD-10-CM

## 2024-02-15 PROCEDURE — 99215 OFFICE O/P EST HI 40 MIN: CPT | Performed by: INTERNAL MEDICINE

## 2024-02-15 ASSESSMENT — PAIN SCALES - GENERAL: PAINLEVEL: NO PAIN (0)

## 2024-02-15 NOTE — LETTER
2/15/2024         RE: Carolyn Briones  [address]  [place] [address]        Dear Colleague,    Thank you for referring your patient, Carolyn Briones, to the [facility] [place]. Please see a copy of my visit note below.    [facility] Neurology Follow Up Visit    Carolyn Briones MRN# [id number]   Age: 58 year old YOB: 1965     Brief history of symptoms: The patient was initially seen in neurologic consultation on 3/12/2021 for evaluation of parasthesias. Please see the comprehensive neurologic consultation note from that date in the Epic records for details.     Interval history:   Patient reports intermittent pressure sensations in the head.    She reports problems with feeling cold.       Past Medical History:     Patient Active Problem List   Diagnosis     Anemia, iron deficiency     CARDIOVASCULAR SCREENING; LDL GOAL LESS THAN 160     Hand pain     Hypovitaminosis D     Female stress incontinence     Iron deficiency anemia due to chronic blood loss     Perimenopause     Stress at home     Thyroid nodule     Neck pain     Empty sella (H24)     Other fatigue     Disturbance of skin sensation     Dorsocervical fat pad     High serum cortisol     Post-menopausal     Anxiety about health     Past Medical History:   Diagnosis Date     Anemia      Empty sella (H24) 2005    only partially empty; possibly normal vaiant     Exposure to other ionizing radiation, subsequent encounter 1986    [place]     GERD (gastroesophageal reflux disease)      Hypercholesterolemia      Hypertension      Hypovitaminosis D 2020     Infection due to 2019 novel coronavirus 08/12/2022     Iron deficiency      Multiple thyroid nodules 2005    subcm     TESSA (obstructive sleep apnea) 11/2022     Sialoadenitis 2023        Past Surgical History:     Past Surgical History:   Procedure Laterality Date     APPENDECTOMY       COLONOSCOPY       DILATION AND CURETTAGE SUCTION  11/13/2012    Procedure:  "DILATION AND CURETTAGE SUCTION;  Suction Dilation & Curettage    (8 weeks);  Surgeon: Ken Rose MD;  [facility]     EXCISE GANGLION WRIST  4/10/2014    Procedure: EXCISE GANGLION WRIST;  Excision Ganglion Cyst, Left Wrist;  Surgeon: Ashley Garcia MD;  [facility]     OPERATIVE HYSTEROSCOPY WITH MORCELLATOR  1/19/2012    Procedure:OPERATIVE HYSTEROSCOPY WITH MORCELLATOR; Hysteroscopy & Polypectomy With Morcellator; Surgeon:KEN ROSE; Location[facility]        Social History:     Social History     Tobacco Use     Smoking status: Never     Smokeless tobacco: Never   Substance Use Topics     Alcohol use: No     Drug use: No        Family History:     Family History   Problem Relation Age of Onset     Lipids Mother      Cancer Mother         desmoplastic mesothelioma     C.A.D. Father         CABG age 65     Thyroid Disease No family hx of      Thyroid Cancer No family hx of         Medications:     Current Outpatient Medications   Medication Sig     atorvastatin (LIPITOR) 20 MG tablet Take 20 mg by mouth daily      famotidine (PEPCID) 20 MG tablet Take 20 mg by mouth     LORazepam (ATIVAN) 0.5 MG tablet Take 0.5 mg by mouth as needed     metoprolol (TOPROL-XL) 25 MG 24 hr tablet      vitamin D3 (CHOLECALCIFEROL) 50 mcg (2000 units) tablet Take 1 tablet (50 mcg) by mouth daily     No current facility-administered medications for this visit.        Allergies:     Allergies   Allergen Reactions     Iron Dextran Shortness Of Breath     Doxycycline Other (See Comments)     High blood pressure     Cipro [Ciprofloxacin] Nausea     Dextran Other (See Comments)     Other reaction(s): Adverse reaction     Liquid Adhesive Itching     Sulphadimidine [Sulfa Antibiotics] Nausea     Adhesive Tape Rash        Review of Systems:   As above     Physical Exam:   Vitals: /77   Pulse 62   Ht 1.613 m (5' 3.5\")   Wt 62.1 kg (137 lb)   LMP 10/04/2013   BMI 23.89 kg/m     General: Seated comfortably in no acute " distress.  Lungs: breathing comfortably  Neurologic:     Mental Status: Fully alert, attentive. Language normal, speech clear and fluent, no paraphasic errors.     Data reviewed on previous visits    Imaging:  MRI lumbar spine 1/13/2021       MRI thoracic 1/7/2021       MRI cervical 1/7/2021       MRI orbits 1/7/2021       MRI brain 1/7/2021       Procedures:  [facility] reportedly normal  Labs 3/2021- unrevealing A1c, B12, B6, ELP, ESR, CRP, YULY, SSA/SSB, TTG Ab, TSH / Possible very small monoclonal IgG immunoglobulin of lambda light on immunofixation  chain type    Pertinent Investigations since last visit:   MRI brain 1/2024  Impression: Essentially normal brain MRI. Inflammatory paranasal sinus  disease prominent at the ethmoidal cells.  An 0.9 cm of calcification along the  falx, may represent calcified meningioma.  Personally reviewed and agree with above impression    MRI brain 2016  FINDINGS: The ventricles remain normal in size and configuration. Normal brain volume.   No intra-axial mass, edema, hemorrhage or parenchymal inflammation. No infarct or encephalomalacia. No developmental anomaly.   Small focus of T2 signal hypointensity along left posterior falx unchanged, probably dystrophic calcification.   No abnormal enhancement after contrast.   Fairly extensive inflammatory membrane thickening and fluid in the paranasal sinuses. Mastoids are clear. Arterial flow voids are maintained. No skull lesion or orbital abnormality.          Assessment and Plan:   Assessment:  Carolyn Briones is a 58 year old female who presents today for follow-up of abnormal sensations and new symptom of left sided head pressure. MRI brain was recently performed and unremarkable. There is a small calcification in the left posterior falx, that was seen on multiple prior scans, is unchanged, and is noncontributory towards her symptoms.     Prior work-up with MRI brain, cervical, thoracic, and lumbar and EMG did not  show an etiology of abnormal sensations. Skin biopsy is pending    Plan:  - Skin biopsy as planned    Follow up in Neurology clinic pending above    Trenotn Rodriguez MD   of Neurology  [facility]      The total time of this encounter today amounted to 42 minutes. This time included time spent with the patient, prep work, ordering tests, and performing post visit documentation.      Again, thank you for allowing me to participate in the care of your patient.        Sincerely,        Trenton Rodriguez MD

## 2024-02-15 NOTE — PROGRESS NOTES
[facility] Neurology Follow Up Visit    Carolyn Briones MRN# [id number]   Age: 58 year old YOB: 1965     Brief history of symptoms: The patient was initially seen in neurologic consultation on 3/12/2021 for evaluation of parasthesias. Please see the comprehensive neurologic consultation note from that date in the Epic records for details.     Interval history:   Patient reports intermittent pressure sensations in the head.    She reports problems with feeling cold.       Past Medical History:     Patient Active Problem List   Diagnosis    Anemia, iron deficiency    CARDIOVASCULAR SCREENING; LDL GOAL LESS THAN 160    Hand pain    Hypovitaminosis D    Female stress incontinence    Iron deficiency anemia due to chronic blood loss    Perimenopause    Stress at home    Thyroid nodule    Neck pain    Empty sella (H24)    Other fatigue    Disturbance of skin sensation    Dorsocervical fat pad    High serum cortisol    Post-menopausal    Anxiety about health     Past Medical History:   Diagnosis Date    Anemia     Empty sella (H24) 2005    only partially empty; possibly normal vaiant    Exposure to other ionizing radiation, subsequent encounter 1986    [place]    GERD (gastroesophageal reflux disease)     Hypercholesterolemia     Hypertension     Hypovitaminosis D 2020    Infection due to 2019 novel coronavirus 08/12/2022    Iron deficiency     Multiple thyroid nodules 2005    subcm    TESSA (obstructive sleep apnea) 11/2022    Sialoadenitis 2023        Past Surgical History:     Past Surgical History:   Procedure Laterality Date    APPENDECTOMY      COLONOSCOPY      DILATION AND CURETTAGE SUCTION  11/13/2012    Procedure: DILATION AND CURETTAGE SUCTION;  Suction Dilation & Curettage    (8 weeks);  Surgeon: Sherri Christiansen MD;  [facility]    EXCISE GANGLION WRIST  4/10/2014    Procedure: EXCISE GANGLION WRIST;  Excision Ganglion Cyst, Left Wrist;  Surgeon: Ashley Garcia MD;  [facility]    OPERATIVE  "HYSTEROSCOPY WITH MORCELLATOR  1/19/2012    Procedure:OPERATIVE HYSTEROSCOPY WITH MORCELLATOR; Hysteroscopy & Polypectomy With Morcellator; Surgeon:KEN ROSE; Location: OR        Social History:     Social History     Tobacco Use    Smoking status: Never    Smokeless tobacco: Never   Substance Use Topics    Alcohol use: No    Drug use: No        Family History:     Family History   Problem Relation Age of Onset    Lipids Mother     Cancer Mother         desmoplastic mesothelioma    C.A.D. Father         CABG age 65    Thyroid Disease No family hx of     Thyroid Cancer No family hx of         Medications:     Current Outpatient Medications   Medication Sig    atorvastatin (LIPITOR) 20 MG tablet Take 20 mg by mouth daily     famotidine (PEPCID) 20 MG tablet Take 20 mg by mouth    LORazepam (ATIVAN) 0.5 MG tablet Take 0.5 mg by mouth as needed    metoprolol (TOPROL-XL) 25 MG 24 hr tablet     vitamin D3 (CHOLECALCIFEROL) 50 mcg (2000 units) tablet Take 1 tablet (50 mcg) by mouth daily     No current facility-administered medications for this visit.        Allergies:     Allergies   Allergen Reactions    Iron Dextran Shortness Of Breath    Doxycycline Other (See Comments)     High blood pressure    Cipro [Ciprofloxacin] Nausea    Dextran Other (See Comments)     Other reaction(s): Adverse reaction    Liquid Adhesive Itching    Sulphadimidine [Sulfa Antibiotics] Nausea    Adhesive Tape Rash        Review of Systems:   As above     Physical Exam:   Vitals: /77   Pulse 62   Ht 1.613 m (5' 3.5\")   Wt 62.1 kg (137 lb)   LMP 10/04/2013   BMI 23.89 kg/m     General: Seated comfortably in no acute distress.  Lungs: breathing comfortably  Neurologic:     Mental Status: Fully alert, attentive. Language normal, speech clear and fluent, no paraphasic errors.     Data reviewed on previous visits    Imaging:  MRI lumbar spine 1/13/2021       MRI thoracic 1/7/2021       MRI cervical 1/7/2021       MRI orbits " 1/7/2021       MRI brain 1/7/2021       Procedures:  EMG 3 weeks ago at [facility] reportedly normal  Labs 3/2021- unrevealing A1c, B12, B6, ELP, ESR, CRP, YULY, SSA/SSB, TTG Ab, TSH / Possible very small monoclonal IgG immunoglobulin of lambda light on immunofixation  chain type    Pertinent Investigations since last visit:   MRI brain 1/2024  Impression: Essentially normal brain MRI. Inflammatory paranasal sinus  disease prominent at the ethmoidal cells.  An 0.9 cm of calcification along the  falx, may represent calcified meningioma.  Personally reviewed and agree with above impression    MRI brain 2016  FINDINGS: The ventricles remain normal in size and configuration. Normal brain volume.   No intra-axial mass, edema, hemorrhage or parenchymal inflammation. No infarct or encephalomalacia. No developmental anomaly.   Small focus of T2 signal hypointensity along left posterior falx unchanged, probably dystrophic calcification.   No abnormal enhancement after contrast.   Fairly extensive inflammatory membrane thickening and fluid in the paranasal sinuses. Mastoids are clear. Arterial flow voids are maintained. No skull lesion or orbital abnormality.          Assessment and Plan:   Assessment:  Carolyn Briones is a 58 year old female who presents today for follow-up of abnormal sensations and new symptom of left sided head pressure. MRI brain was recently performed and unremarkable. There is a small calcification in the left posterior falx, that was seen on multiple prior scans, is unchanged, and is noncontributory towards her symptoms.     Prior work-up with MRI brain, cervical, thoracic, and lumbar and EMG did not show an etiology of abnormal sensations. Skin biopsy is pending    Plan:  - Skin biopsy as planned    Follow up in Neurology clinic pending above    Trenton Rodriguez MD   of Neurology  [facility]      The total time of this encounter today amounted to 42 minutes. This time  included time spent with the patient, prep work, ordering tests, and performing post visit documentation.

## 2024-02-16 ENCOUNTER — TRANSFERRED RECORDS (OUTPATIENT)
Dept: HEALTH INFORMATION MANAGEMENT | Facility: CLINIC | Age: 59
End: 2024-02-16
Payer: COMMERCIAL

## 2024-02-19 ENCOUNTER — MEDICAL CORRESPONDENCE (OUTPATIENT)
Dept: HEALTH INFORMATION MANAGEMENT | Facility: CLINIC | Age: 59
End: 2024-02-19

## 2024-02-20 ENCOUNTER — ANCILLARY PROCEDURE (OUTPATIENT)
Dept: ULTRASOUND IMAGING | Facility: CLINIC | Age: 59
End: 2024-02-20
Payer: COMMERCIAL

## 2024-02-20 ENCOUNTER — TRANSCRIBE ORDERS (OUTPATIENT)
Dept: OTHER | Age: 59
End: 2024-02-20

## 2024-02-20 DIAGNOSIS — E04.1 THYROID NODULE: ICD-10-CM

## 2024-02-20 DIAGNOSIS — Z86.19 HISTORY OF HERPES ZOSTER: Primary | ICD-10-CM

## 2024-02-20 PROCEDURE — 76536 US EXAM OF HEAD AND NECK: CPT | Performed by: STUDENT IN AN ORGANIZED HEALTH CARE EDUCATION/TRAINING PROGRAM

## 2024-02-22 ENCOUNTER — TRANSCRIBE ORDERS (OUTPATIENT)
Dept: OTHER | Age: 59
End: 2024-02-22

## 2024-02-22 DIAGNOSIS — Z86.19 HISTORY OF HERPES ZOSTER: Primary | ICD-10-CM

## 2024-03-06 ENCOUNTER — TELEPHONE (OUTPATIENT)
Dept: NEUROLOGY | Facility: CLINIC | Age: 59
End: 2024-03-06

## 2024-03-06 ENCOUNTER — OFFICE VISIT (OUTPATIENT)
Dept: NEUROLOGY | Facility: CLINIC | Age: 59
End: 2024-03-06
Payer: COMMERCIAL

## 2024-03-06 DIAGNOSIS — G60.9 HEREDITARY AND IDIOPATHIC PERIPHERAL NEUROPATHY: Primary | ICD-10-CM

## 2024-03-06 PROCEDURE — 88356 ANALYSIS NERVE: CPT | Performed by: PSYCHIATRY & NEUROLOGY

## 2024-03-06 PROCEDURE — 11104 PUNCH BX SKIN SINGLE LESION: CPT | Performed by: PSYCHIATRY & NEUROLOGY

## 2024-03-06 PROCEDURE — 11105 PUNCH BX SKIN EA SEP/ADDL: CPT | Performed by: PSYCHIATRY & NEUROLOGY

## 2024-03-06 NOTE — TELEPHONE ENCOUNTER
RECORDS RECEIVED FROM:    Appt Date: 3/20/2024   History of herpes zoster     Action    Action Taken 3/7/2024 8:31AM KECECY     I called pt Carolyn - she mentioned having records at [facility]. [place] [facility] [place]: [phone] #2- we already have records in EPIC      NOTES (Gather within 2 years) STATUS DETAILS   OFFICE NOTE from referring provider   Referred by [facility] Provider  Daljit Huitron MD        LABS (any labs) Internal    MEDICATION LIST Internal         
no

## 2024-03-06 NOTE — PROGRESS NOTES
Procedure Note: Neurodiagnostic skin biopsy   ?Reason for biopsy: Pain in feet. Eval for small fiber neuropathy.   Referral: Dr. Trenton Rodriguez  Written informed consent was obtained.   ? The standard sites on the right foot dorsum and medial calf were sterilely prepped. Xylocaine 1% was administered by subdermal injection. A total of 10 mL was used. A 3 mm punch biopsy was removed from each site. The specimens were placed in fixative. The specimen from the foot was placed in a vial labeled as right foot and the specimen from the calf was labeled as right calf. The presence of the specimen in the vial was verified by two individuals. After verification the samples were sent to the neuropathology laboratory for analysis. The biopsy sites were dressed with a pressure bandage. Estimated blood loss was less than 10 drops total. The patient was informed about post-procedure bandage and biopsy site care. She was advised that it might take up to 6 weeks for results of the test to be available. No complications.   Beto Huang MD  Department of Neurology

## 2024-03-06 NOTE — LETTER
3/6/2024       RE: Carolyn Briones  [address]  [facility] [address]     Dear Colleague,    Thank you for referring your patient, Carolyn Briones, to the [facility] [place] at [facility]. Please see a copy of my visit note below.       Procedure Note: Neurodiagnostic skin biopsy   ?Reason for biopsy: Pain in feet. Eval for small fiber neuropathy.   Referral: Dr. Trenton Rodriguez  Written informed consent was obtained.   ? The standard sites on the right foot dorsum and medial calf were sterilely prepped. Xylocaine 1% was administered by subdermal injection. A total of 10 mL was used. A 3 mm punch biopsy was removed from each site. The specimens were placed in fixative. The specimen from the foot was placed in a vial labeled as right foot and the specimen from the calf was labeled as right calf. The presence of the specimen in the vial was verified by two individuals. After verification the samples were sent to the neuropathology laboratory for analysis. The biopsy sites were dressed with a pressure bandage. Estimated blood loss was less than 10 drops total. The patient was informed about post-procedure bandage and biopsy site care. She was advised that it might take up to 6 weeks for results of the test to be available. No complications.         Again, thank you for allowing me to participate in the care of your patient.      Sincerely,    Beto Huang MD

## 2024-03-20 ENCOUNTER — TELEPHONE (OUTPATIENT)
Dept: INFECTIOUS DISEASES | Facility: CLINIC | Age: 59
End: 2024-03-20
Payer: COMMERCIAL

## 2024-03-20 ENCOUNTER — OFFICE VISIT (OUTPATIENT)
Dept: INFECTIOUS DISEASES | Facility: CLINIC | Age: 59
End: 2024-03-20
Attending: STUDENT IN AN ORGANIZED HEALTH CARE EDUCATION/TRAINING PROGRAM
Payer: COMMERCIAL

## 2024-03-20 ENCOUNTER — PRE VISIT (OUTPATIENT)
Dept: INFECTIOUS DISEASES | Facility: CLINIC | Age: 59
End: 2024-03-20
Payer: COMMERCIAL

## 2024-03-20 VITALS
BODY MASS INDEX: 23.39 KG/M2 | SYSTOLIC BLOOD PRESSURE: 123 MMHG | DIASTOLIC BLOOD PRESSURE: 86 MMHG | HEART RATE: 76 BPM | HEIGHT: 64 IN | TEMPERATURE: 97.8 F | WEIGHT: 137 LBS

## 2024-03-20 DIAGNOSIS — B02.23 SHINGLES (HERPES ZOSTER) POLYNEUROPATHY: Primary | ICD-10-CM

## 2024-03-20 LAB — SCANNED LAB RESULT: NORMAL

## 2024-03-20 PROCEDURE — 99213 OFFICE O/P EST LOW 20 MIN: CPT | Performed by: STUDENT IN AN ORGANIZED HEALTH CARE EDUCATION/TRAINING PROGRAM

## 2024-03-20 PROCEDURE — 99243 OFF/OP CNSLTJ NEW/EST LOW 30: CPT | Mod: GC | Performed by: STUDENT IN AN ORGANIZED HEALTH CARE EDUCATION/TRAINING PROGRAM

## 2024-03-20 ASSESSMENT — PAIN SCALES - GENERAL: PAINLEVEL: NO PAIN (0)

## 2024-03-20 NOTE — PATIENT INSTRUCTIONS
Carolyn was seen today at the  Infectious Diseases clinic [facility] for concern about recurrent shingles.    The following is a brief outline of the plan as we discussed during the visit:   - no further immune evaluation   - do recommend the the shingles vaccine       We will send a letter to you and your primary care provider summarizing our recommendations and the results of any testing performed today. Meanwhile feel free to contact our clinic at any time with questions and clarifications.    Thank you,    Tennille Bowman MD  Infectious Diseases Fellow     Infectious Diseases Clinic   [facility]     Contact info:  Clinic Coordinator: [phone]  Clinic Fax: [phone]    -------------------------------------------------------------------------------------------------------  Medical Records: [phone]  Financial Counselor (Billing and Insurance Questions): [phone]  Prior Authorizations: [phone]

## 2024-03-20 NOTE — LETTER
3/20/2024       RE: Carolyn Briones  [address]  [place] [address]     Dear Colleague,    Thank you for referring your patient, Carolyn Briones, to the [facility] [place] at [facility]. Please see a copy of my visit note below.      Date: 2024    To:Gentry Baer   [facility] [address]  [facility] [facility] [address]     Pt: Carolyn Briones  MR: [id number]  : 1965  MALGORZATA: 3/20/2024    Dear Gentry Baer MD    I had the pleasure of seeing Carolyn at the Infectious Diseases Clinic at the [facility] as a referral from Daljit Huitron MD   for consultation due to recurrent shingles. History was obtained from our discussion during the visit and review of Carolyn's pertinent, available health records.     Carolyn is a 58 yr old F w/ here to discuss concern about recurrent shingles infection.   Carolyn reports onset of upper right back nerve pain in . Described as severe nerve pain, that was sensitive to the touch. She felt her back and noted a small 1cm red pimple, no blister. Went to see her provider and they thought her presentation was consistent with shingles. They prescribed an antiviral and she took this for she thinks a week. She reports no further progression of any rash at that time. She ended up having post-syndrome pain for about 3-4 weeks after that.   Then in 2024 she developed pain on her chest on the anterior side that was similar in nature to the previous episode of nerve pain where shingles was diagnosed. She went to provider and they gave her ativiral treatment in case lasted longer. She was planning an international travel trip. The pain disappeared in 2 days and the treatment did not need to be started. No changes to skin occurred. No other recurrences.     She reports classic rash and viral syndrome in childhood at age 4 when growing up in [place]. She otherwise is healthy in  terms of infectious etiologies. No need for antibiotic treatment for common community acquired infections, no complicated or prolonged infections. No needing admission for infections. She was tested for HIV last year and this was negative. No weight loss, fevers, night sweats. She does report a prolonged diarrheal episode after travel to [place] and tested positive for multiple bacteria on stool sample.        Past Medical History:   Past Medical History:   Diagnosis Date    Anemia     Empty sella (H24) 2005    only partially empty; possibly normal vaiant    Exposure to other ionizing radiation, subsequent encounter 1986    [place]    GERD (gastroesophageal reflux disease)     Hypercholesterolemia     Hypertension     Hypovitaminosis D 2020    Infection due to 2019 novel coronavirus 08/12/2022    Iron deficiency     Multiple thyroid nodules 2005    subcm    TESSA (obstructive sleep apnea) 11/2022    Sialoadenitis 2023       Past Surgical History:  Past Surgical History:   Procedure Laterality Date    APPENDECTOMY      COLONOSCOPY      DILATION AND CURETTAGE SUCTION  11/13/2012    Procedure: DILATION AND CURETTAGE SUCTION;  Suction Dilation & Curettage    (8 weeks);  Surgeon: Ken Rose MD;  [facility]    EXCISE GANGLION WRIST  4/10/2014    Procedure: EXCISE GANGLION WRIST;  Excision Ganglion Cyst, Left Wrist;  Surgeon: Ashley Garcia MD;  [facility]    OPERATIVE HYSTEROSCOPY WITH MORCELLATOR  1/19/2012    Procedure:OPERATIVE HYSTEROSCOPY WITH MORCELLATOR; Hysteroscopy & Polypectomy With Morcellator; Surgeon:KEN ROSE; [facility]       Family History:   Family History   Problem Relation Age of Onset    Lipids Mother     Cancer Mother         desmoplastic mesothelioma    C.A.D. 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   Lives alone.   Moved here with  "mother as a child.   Born [place] the [place].     Immunizations:  Immunization History   Administered Date(s) Administered    COVID-19 MONOVALENT 12+ (Pfizer) 04/15/2021, 05/06/2021, 12/09/2021    TD,PF 7+ (Tenivac) 09/04/2007    TDAP Vaccine (Boostrix) 04/28/2014       Allergies:      Allergies   Allergen Reactions    Iron Dextran Shortness Of Breath    Doxycycline Other (See Comments)     High blood pressure    Cipro [Ciprofloxacin] Nausea    Dextran Other (See Comments)     Other reaction(s): Adverse reaction    Liquid Adhesive Itching    Sulphadimidine [Sulfa Antibiotics] Nausea    Adhesive Tape Rash       Review of Systems:   Comprehensive ROS is negative except as per HPI.    Physical Exam   /86   Pulse 76   Temp 97.8  F (36.6  C) (Oral)   Ht 1.613 m (5' 3.5\")   Wt 62.1 kg (137 lb)   LMP 10/04/2013   BMI 23.89 kg/m    General: Well appearing, no distress  HEENT: Normocephalic, no scleral injections, EOMI,  moist mucosa  Neck: supple, ROM intact  Lungs: speaks in full sentences without difficulty   Neuro: CN 2-12 grossly intact, normal muscle bulk and tone, normal gait  Skin: no rash on observed skin  Lymph: no cervical adenopathy    Lab:  No pertinent ID labs   HIV negative in Dec 2023 (reported by patient)     Imaging:   Brain MRI  Jan 2024   No intracranial mass lesion, mass effect, midline shift, or  abnormal fluid collection. The ventricles and sulci are normal for  age. No abnormality of reduced diffusion.  Normal intravascular flow  voids. Partially empty sella. An 0.9 cm of calcification along the  falx, may represent calcified meningioma. Mucosal thickening of  ethmoidal cells. Decreased size of mucinous retention cyst in the  floor of right maxillary sinus. Frothy appearance in the right  posterior ethmoid as well as right sphenoid locule.  Essentially normal brain MRI. Inflammatory paranasal sinus  disease prominent at the ethmoidal cells.      Assessment/Plan:   Carolyn is a 58 yr old F " here with a concern about recurrent shingles. Initial varicella infection with typical presentation of rash and fever in childhood. Shingles suspected in Nov 2020 description of dermatomal pain with skin lesion (no vesicle) and prolonged post-herpetic neuralgia syndrome. Unclear if recurrence of anterior chest wall pain consistent with recurrence of shingles. Could have been neuralgia pain due to sleeping on the couch knowing that previous nerve dermatome was trauma suspected prior shingles. No red flags in clinical history concerning for CVID or other adult onset immune dysfunction. Approximately 1 to 6 percent of individuals will experience a second episode of herpes zoster. Recurrent herpes zoster is more frequent in women. Three or more episodes recurring in the same individual are very rare. If recurrent herpes zoster is suspected, laboratory confirmation may be reasonable to rule out other etiologies, such as recurrent zosteriform herpes simplex, or a non-infectious etiology, such as contact dermatitis. We do recommend Carolyn pursues the shingle vaccine to help protect again future recurrences.       No follow up needed.     If new concerns arise I would be happy to see Carolyn again at clinic sooner.      Thank you for allowing me to assist in Carolyn's care.       Sincerely,    Tennille Bowman MD  Adult and Pediatric Infectious Diseases Fellow PGY7  [facility]   Clinic Phone Number: [phone]  Clinic Fax:  [phone]         Infectious Disease Clinic Staff Note: Ms. Briones was seen, examined, and the case was discussed with Dr. Bowman, ID Fellow -- I agree with her consultative history and examination, assessment and plan in this outpatient ID Consult note. This note reflects my observations and opinions and the plan outlined fully reflects my approach. I have reviewed the available history, radiology, laboratory results, and reports with the Fellow.

## 2024-03-20 NOTE — PROGRESS NOTES
Date: 2024    To:Gentry Baer   [facility] [address]  [facility] [address]     Pt: Carolyn Briones  MR: [id number]  : 1965  MALGORZATA: 3/20/2024    Dear Gentry Baer MD    I had the pleasure of seeing Carolyn at the Infectious Diseases Clinic at the [facility] as a referral from Daljit Huitron MD   for consultation due to recurrent shingles. History was obtained from our discussion during the visit and review of Carolyn's pertinent, available health records.     Carolyn is a 58 yr old F w/ here to discuss concern about recurrent shingles infection.   Carolyn reports onset of upper right back nerve pain in . Described as severe nerve pain, that was sensitive to the touch. She felt her back and noted a small 1cm red pimple, no blister. Went to see her provider and they thought her presentation was consistent with shingles. They prescribed an antiviral and she took this for she thinks a week. She reports no further progression of any rash at that time. She ended up having post-syndrome pain for about 3-4 weeks after that.   Then in 2024 she developed pain on her chest on the anterior side that was similar in nature to the previous episode of nerve pain where shingles was diagnosed. She went to provider and they gave her ativiral treatment in case lasted longer. She was planning an international travel trip. The pain disappeared in 2 days and the treatment did not need to be started. No changes to skin occurred. No other recurrences.     She reports classic rash and viral syndrome in childhood at age 4 when growing up in [place]. She otherwise is healthy in terms of infectious etiologies. No need for antibiotic treatment for common community acquired infections, no complicated or prolonged infections. No needing admission for infections. She was tested for HIV last year and this was negative. No weight loss, fevers, night sweats. She does report a prolonged diarrheal episode  after travel to [place] and tested positive for multiple bacteria on stool sample.        Past Medical History:   Past Medical History:   Diagnosis Date     Anemia      Empty sella (H24) 2005    only partially empty; possibly normal vaiant     Exposure to other ionizing radiation, subsequent encounter 1986    [place]     GERD (gastroesophageal reflux disease)      Hypercholesterolemia      Hypertension      Hypovitaminosis D 2020     Infection due to 2019 novel coronavirus 08/12/2022     Iron deficiency      Multiple thyroid nodules 2005    subcm     TESSA (obstructive sleep apnea) 11/2022     Sialoadenitis 2023       Past Surgical History:  Past Surgical History:   Procedure Laterality Date     APPENDECTOMY       COLONOSCOPY       DILATION AND CURETTAGE SUCTION  11/13/2012    Procedure: DILATION AND CURETTAGE SUCTION;  Suction Dilation & Curettage    (8 weeks);  Surgeon: Ken Rose MD;  [facility]: [facility]     EXCISE GANGLION WRIST  4/10/2014    Procedure: EXCISE GANGLION WRIST;  Excision Ganglion Cyst, Left Wrist;  Surgeon: Ashley Garcia MD;  Location:  OR     OPERATIVE HYSTEROSCOPY WITH MORCELLATOR  1/19/2012    Procedure:OPERATIVE HYSTEROSCOPY WITH MORCELLATOR; Hysteroscopy & Polypectomy With Morcellator; Surgeon:KEN ROSE; [facility] OR       Family History:   Family History   Problem Relation Age of Onset     Lipids Mother      Cancer Mother         desmoplastic mesothelioma     C.A.D. 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   Lives alone.   Moved here with mother as a child.   Born in the [place].     Immunizations:  Immunization History   Administered Date(s) Administered     COVID-19 MONOVALENT 12+ (Pfizer) 04/15/2021, 05/06/2021, 12/09/2021     TD,PF 7+ (Tenivac) 09/04/2007     TDAP Vaccine (Boostrix) 04/28/2014       Allergies:      Allergies  "  Allergen Reactions     Iron Dextran Shortness Of Breath     Doxycycline Other (See Comments)     High blood pressure     Cipro [Ciprofloxacin] Nausea     Dextran Other (See Comments)     Other reaction(s): Adverse reaction     Liquid Adhesive Itching     Sulphadimidine [Sulfa Antibiotics] Nausea     Adhesive Tape Rash       Review of Systems:   Comprehensive ROS is negative except as per HPI.    Physical Exam   /86   Pulse 76   Temp 97.8  F (36.6  C) (Oral)   Ht 1.613 m (5' 3.5\")   Wt 62.1 kg (137 lb)   LMP 10/04/2013   BMI 23.89 kg/m    General: Well appearing, no distress  HEENT: Normocephalic, no scleral injections, EOMI,  moist mucosa  Neck: supple, ROM intact  Lungs: speaks in full sentences without difficulty   Neuro: CN 2-12 grossly intact, normal muscle bulk and tone, normal gait  Skin: no rash on observed skin  Lymph: no cervical adenopathy    Lab:  No pertinent ID labs   HIV negative in Dec 2023 (reported by patient)     Imaging:   Brain MRI  Jan 2024   No intracranial mass lesion, mass effect, midline shift, or  abnormal fluid collection. The ventricles and sulci are normal for  age. No abnormality of reduced diffusion.  Normal intravascular flow  voids. Partially empty sella. An 0.9 cm of calcification along the  falx, may represent calcified meningioma. Mucosal thickening of  ethmoidal cells. Decreased size of mucinous retention cyst in the  floor of right maxillary sinus. Frothy appearance in the right  posterior ethmoid as well as right sphenoid locule.  Essentially normal brain MRI. Inflammatory paranasal sinus  disease prominent at the ethmoidal cells.      Assessment/Plan:   Carolyn is a 58 yr old F here with a concern about recurrent shingles. Initial varicella infection with typical presentation of rash and fever in childhood. Shingles suspected in Nov 2020 description of dermatomal pain with skin lesion (no vesicle) and prolonged post-herpetic neuralgia syndrome. Unclear if " recurrence of anterior chest wall pain consistent with recurrence of shingles. Could have been neuralgia pain due to sleeping on the couch knowing that previous nerve dermatome was trauma suspected prior shingles. No red flags in clinical history concerning for CVID or other adult onset immune dysfunction. Approximately 1 to 6 percent of individuals will experience a second episode of herpes zoster. Recurrent herpes zoster is more frequent in women. Three or more episodes recurring in the same individual are very rare. If recurrent herpes zoster is suspected, laboratory confirmation may be reasonable to rule out other etiologies, such as recurrent zosteriform herpes simplex, or a non-infectious etiology, such as contact dermatitis. We do recommend Carolyn pursues the shingle vaccine to help protect again future recurrences.       No follow up needed.     If new concerns arise I would be happy to see Carolyn again at clinic sooner.      Thank you for allowing me to assist in Carolyn's care.       Sincerely,    Tennille Bowman MD  Adult and Pediatric Infectious Diseases Fellow PGY7  [facility]   Clinic Phone Number: [phone]  Clinic Fax:  [phone]

## 2024-03-20 NOTE — NURSING NOTE
"Chief Complaint   Patient presents with    New Patient     Recurrent herpes zoster     /86   Pulse 76   Temp 97.8  F (36.6  C) (Oral)   Ht 1.613 m (5' 3.5\")   Wt 62.1 kg (137 lb)   LMP 10/04/2013   BMI 23.89 kg/m    Casie Calderón MA on 3/20/2024 at 3:49 PM    "

## 2024-03-20 NOTE — TELEPHONE ENCOUNTER
LVM for patient offering earlier appointment time today of 3:00pm rather than 3:30pm. Requested she call back if she would like to make this change. Provided direct line and call center numbers for call back.

## 2024-03-25 NOTE — PROGRESS NOTES
Infectious Disease Clinic Staff Note: Ms. Briones was seen, examined, and the case was discussed with Dr. Bowman, ID Fellow -- I agree with her consultative history and examination, assessment and plan in this outpatient ID Consult note. This note reflects my observations and opinions and the plan outlined fully reflects my approach. I have reviewed the available history, radiology, laboratory results, and reports with the Fellow.

## 2024-10-11 ENCOUNTER — HOSPITAL ENCOUNTER (OUTPATIENT)
Dept: MAMMOGRAPHY | Facility: CLINIC | Age: 59
Discharge: HOME | End: 2024-10-11
Attending: INTERNAL MEDICINE | Admitting: INTERNAL MEDICINE
Payer: COMMERCIAL

## 2024-10-11 DIAGNOSIS — Z12.31 VISIT FOR SCREENING MAMMOGRAM: ICD-10-CM

## 2024-10-11 PROCEDURE — 77063 BREAST TOMOSYNTHESIS BI: CPT

## 2024-10-14 ENCOUNTER — MYC MEDICAL ADVICE (OUTPATIENT)
Dept: NEUROLOGY | Facility: CLINIC | Age: 59
End: 2024-10-14
Payer: COMMERCIAL

## 2024-10-15 NOTE — TELEPHONE ENCOUNTER
Trenton Rodriguez MD  You1 hour ago (7:24 AM)     We can let her know that it came back normal, which is good news. For some reason the result was never forwarded to me. This has been a problem with skin biopsies. I'll look into why this is happening. Thanks!

## 2024-10-15 NOTE — TELEPHONE ENCOUNTER
Writer contacted patient to let her know that her skin biopsy results were normal. Writer apologized that it took us so long to reach out to her with her results and that for some reason Dr. Rodriguez wasn't forwarded her results. Explained that Dr. Rodriguez reviewed her results and both her right foot and right calf biopsies came back within normal limits. Patient requested a follow up with Dr. Rodriguez and has been scheduled for 10/30 in [place]. She has also been added to the provider's wait list.      MARY KATE Briggs RN Care Coordinator  Neurology/Neurosurgery/PM&R/Pain Management

## 2024-10-30 ENCOUNTER — OFFICE VISIT (OUTPATIENT)
Dept: NEUROLOGY | Facility: CLINIC | Age: 59
End: 2024-10-30
Payer: COMMERCIAL

## 2024-10-30 VITALS
HEART RATE: 66 BPM | BODY MASS INDEX: 24.59 KG/M2 | DIASTOLIC BLOOD PRESSURE: 67 MMHG | WEIGHT: 141 LBS | SYSTOLIC BLOOD PRESSURE: 107 MMHG

## 2024-10-30 DIAGNOSIS — M54.16 LUMBAR BACK PAIN WITH RADICULOPATHY AFFECTING RIGHT LOWER EXTREMITY: Primary | ICD-10-CM

## 2024-10-30 DIAGNOSIS — R20.9 DISTURBANCE OF SKIN SENSATION: ICD-10-CM

## 2024-10-30 PROCEDURE — 99214 OFFICE O/P EST MOD 30 MIN: CPT | Performed by: INTERNAL MEDICINE

## 2024-10-30 NOTE — LETTER
10/30/2024      Carolyn Briones  [address]  [facility] [address]      Dear Colleague,    Thank you for referring your patient, Carolyn Briones, to the [facility] [place]. Please see a copy of my visit note below.    [facility] Neurology Follow Up Visit    Carolyn Briones MRN# [id number]   Age: 58 year old YOB: 1965     Brief history of symptoms: The patient was initially seen in neurologic consultation on 3/12/2021 for evaluation of parasthesias. Please see the comprehensive neurologic consultation note from that date in the Epic records for details.          Assessment and Plan:   Assessment:  Carolyn Briones is a 58 year old female who presents today for follow-up of abnormal sensations. Prior work-up with MRI brain, cervical, thoracic, and lumbar, EMG, and skin biopsy did not show an etiology of abnormal sensations.     Patient reports new radiating right leg pain over the last several months. She will consider PT referral. MRI lumbar spine ordered to evaluate for structural abnormality contributing to symptoms.     Plan:  - MRI lumbar spine without contrast    Follow up in Neurology clinic pending above    Trenton Rodriguez MD   of Neurology  [facility]  --------------------------------------------------------------------------    Interval history:   Patient reports continued cold sensations and burning sensations in the feet. She also feels crawling sensations.     She feels pins and needles in the left hand at times. She would like to monitor this.       Past Medical History:     Patient Active Problem List   Diagnosis     Anemia, iron deficiency     CARDIOVASCULAR SCREENING; LDL GOAL LESS THAN 160     Hand pain     Hypovitaminosis D     Female stress incontinence     Iron deficiency anemia due to chronic blood loss     Perimenopause     Stress at home     Thyroid nodule     Neck pain     Empty sella (H)     Other fatigue     Disturbance  of skin sensation     Dorsocervical fat pad     High serum cortisol     Post-menopausal     Anxiety about health     Past Medical History:   Diagnosis Date     Anemia      Empty sella (H) 2005    only partially empty; possibly normal vaiant     Exposure to other ionizing radiation, subsequent encounter 1986    [place]     GERD (gastroesophageal reflux disease)      Hypercholesterolemia      Hypertension      Hypovitaminosis D 2020     Infection due to 2019 novel coronavirus 08/12/2022     Iron deficiency      Multiple thyroid nodules 2005    subcm     TESSA (obstructive sleep apnea) 11/2022     Sialoadenitis 2023        Past Surgical History:     Past Surgical History:   Procedure Laterality Date     APPENDECTOMY       COLONOSCOPY       DILATION AND CURETTAGE SUCTION  11/13/2012    Procedure: DILATION AND CURETTAGE SUCTION;  Suction Dilation & Curettage    (8 weeks);  Surgeon: Ken Rose MD;  [facility]     EXCISE GANGLION WRIST  4/10/2014    Procedure: EXCISE GANGLION WRIST;  Excision Ganglion Cyst, Left Wrist;  Surgeon: Ashley Garcia MD;  [facility]     OPERATIVE HYSTEROSCOPY WITH MORCELLATOR  1/19/2012    Procedure:OPERATIVE HYSTEROSCOPY WITH MORCELLATOR; Hysteroscopy & Polypectomy With Morcellator; Surgeon:KEN ROSE; [facility]        Social History:     Social History     Tobacco Use     Smoking status: Never     Smokeless tobacco: Never   Substance Use Topics     Alcohol use: No     Drug use: No        Family History:     Family History   Problem Relation Age of Onset     Lipids Mother      Cancer Mother         desmoplastic mesothelioma     C.A.D. Father         CABG age 65     Thyroid Disease No family hx of      Thyroid Cancer No family hx of         Medications:     Current Outpatient Medications   Medication Sig Dispense Refill     atorvastatin (LIPITOR) 20 MG tablet Take 20 mg by mouth daily        famotidine (PEPCID) 20 MG tablet Take 20 mg by mouth       LORazepam (ATIVAN)  0.5 MG tablet Take 0.5 mg by mouth as needed  2     metoprolol (TOPROL-XL) 25 MG 24 hr tablet   0     vitamin D3 (CHOLECALCIFEROL) 50 mcg (2000 units) tablet Take 1 tablet (50 mcg) by mouth daily 90 tablet 4     No current facility-administered medications for this visit.        Allergies:     Allergies   Allergen Reactions     Iron Dextran Shortness Of Breath     Doxycycline Other (See Comments)     High blood pressure     Cipro [Ciprofloxacin] Nausea     Dextran Other (See Comments)     Other reaction(s): Adverse reaction     Liquid Adhesive Itching     Sulphadimidine [Sulfa Antibiotics] Nausea     Adhesive Tape Rash        Review of Systems:   As above     Physical Exam:   Vitals: LMP 10/04/2013    General: Seated comfortably in no acute distress.  Lungs: breathing comfortably  Neurologic:     Mental Status: Fully alert, attentive. Language normal, speech clear and fluent, no paraphasic errors.     Data reviewed on previous visits    Imaging:  MRI lumbar spine 1/13/2021       MRI thoracic 1/7/2021       MRI cervical 1/7/2021       MRI orbits 1/7/2021       MRI brain 1/7/2021       Procedures:  EMG 3 weeks ago at [facility] reportedly normal  Labs 3/2021- unrevealing A1c, B12, B6, ELP, ESR, CRP, YULY, SSA/SSB, TTG Ab, TSH / Possible very small monoclonal IgG immunoglobulin of lambda light on immunofixation  chain type    MRI brain 1/2024  Impression: Essentially normal brain MRI. Inflammatory paranasal sinus  disease prominent at the ethmoidal cells.  An 0.9 cm of calcification along the  falx, may represent calcified meningioma.      MRI brain 2016  FINDINGS: The ventricles remain normal in size and configuration. Normal brain volume.   No intra-axial mass, edema, hemorrhage or parenchymal inflammation. No infarct or encephalomalacia. No developmental anomaly.   Small focus of T2 signal hypointensity along left posterior falx unchanged, probably dystrophic calcification.   No abnormal enhancement after contrast.   Fairly  extensive inflammatory membrane thickening and fluid in the paranasal sinuses. Mastoids are clear. Arterial flow voids are maintained. No skull lesion or orbital abnormality.     Pertinent Investigations since last visit:         The total time of this encounter today amounted to 30 minutes. This time included time spent with the patient, prep work, ordering tests, and performing post visit documentation.      Again, thank you for allowing me to participate in the care of your patient.        Sincerely,        Trenton Rodriguez MD

## 2024-12-06 ENCOUNTER — ANCILLARY PROCEDURE (OUTPATIENT)
Dept: MRI IMAGING | Facility: CLINIC | Age: 59
End: 2024-12-06
Attending: INTERNAL MEDICINE
Payer: COMMERCIAL

## 2024-12-06 DIAGNOSIS — M54.16 LUMBAR BACK PAIN WITH RADICULOPATHY AFFECTING RIGHT LOWER EXTREMITY: ICD-10-CM

## 2024-12-06 PROCEDURE — 72148 MRI LUMBAR SPINE W/O DYE: CPT | Mod: GC | Performed by: RADIOLOGY

## 2024-12-11 ENCOUNTER — TELEPHONE (OUTPATIENT)
Dept: ENDOCRINOLOGY | Facility: CLINIC | Age: 59
End: 2024-12-11
Payer: COMMERCIAL

## 2024-12-11 NOTE — TELEPHONE ENCOUNTER
Called pt to see if she needed to keep 1/15/25 appt w Dr. Egan. No answer. LVM asking pt to call back and confirm.

## 2025-01-04 ENCOUNTER — HEALTH MAINTENANCE LETTER (OUTPATIENT)
Age: 60
End: 2025-01-04

## 2025-01-20 NOTE — PROGRESS NOTES
[facility] Neurology Follow Up Visit    Carolyn Briones MRN# [id number]   Age: 59 year old YOB: 1965     Brief history of symptoms: The patient was initially seen in neurologic consultation on 3/12/2021 for evaluation of parasthesias. Please see the comprehensive neurologic consultation note from that date in the Epic records for details.          Assessment and Plan:   Assessment:  Carolyn Briones is a 59 year old female who presents today for follow-up of abnormal sensations. Prior work-up with MRI brain, cervical, thoracic, and lumbar, EMG, and skin biopsy did not show an etiology of abnormal sensations.     MRI lumbar spine was ordered for right leg pains. This was reviewed and is stable compared to 2021. Spine referral could be considered if right leg pains worsen.     Patient reports new neck pain over the last 2 months. MRI cervical spine ordered to evaluate for structural abnormality contributing to symptoms.     MRI brain also ordered to follow-up prior non specific calcification seen on imaging.     Plan:  - MRI brain with and without contrast  - MRI cervical without contrast    Follow up in Neurology clinic pending above    Trenton Rodriguez MD   of Neurology  [facility]  --------------------------------------------------------------------------    Interval history:   Patient still has right leg sciatica (feels like a tooth ache). It radiates into the right thigh.It is not an every day thing and she will monitor for now.       Past Medical History:     Patient Active Problem List   Diagnosis    Anemia, iron deficiency    CARDIOVASCULAR SCREENING; LDL GOAL LESS THAN 160    Hand pain    Hypovitaminosis D    Female stress incontinence    Iron deficiency anemia due to chronic blood loss    Perimenopause    Stress at home    Thyroid nodule    Neck pain    Empty sella    Other fatigue    Disturbance of skin sensation    Dorsocervical fat pad    High serum cortisol     Post-menopausal    Anxiety about health     Past Medical History:   Diagnosis Date    Anemia     Empty sella 2005    only partially empty; possibly normal vaiant    Exposure to other ionizing radiation, subsequent encounter 1986    [place]    GERD (gastroesophageal reflux disease)     Hypercholesterolemia     Hypertension     Hypovitaminosis D 2020    Infection due to 2019 novel coronavirus 08/12/2022    Iron deficiency     Multiple thyroid nodules 2005    subcm    TESSA (obstructive sleep apnea) 11/2022    Sialoadenitis 2023        Past Surgical History:     Past Surgical History:   Procedure Laterality Date    APPENDECTOMY      COLONOSCOPY      DILATION AND CURETTAGE SUCTION  11/13/2012    Procedure: DILATION AND CURETTAGE SUCTION;  Suction Dilation & Curettage    (8 weeks);  Surgeon: Ken Rose MD;  [facility]    EXCISE GANGLION WRIST  4/10/2014    Procedure: EXCISE GANGLION WRIST;  Excision Ganglion Cyst, Left Wrist;  Surgeon: Ashley Garcia MD;  [facility]    OPERATIVE HYSTEROSCOPY WITH MORCELLATOR  1/19/2012    Procedure:OPERATIVE HYSTEROSCOPY WITH MORCELLATOR; Hysteroscopy & Polypectomy With Morcellator; Surgeon:KEN ROSE; [facility]        Social History:     Social History     Tobacco Use    Smoking status: Never    Smokeless tobacco: Never   Substance Use Topics    Alcohol use: No    Drug use: No        Family History:     Family History   Problem Relation Age of Onset    Lipids Mother     Cancer Mother         desmoplastic mesothelioma    C.A.D. Father         CABG age 65    Thyroid Disease No family hx of     Thyroid Cancer No family hx of         Medications:     Current Outpatient Medications   Medication Sig Dispense Refill    atorvastatin (LIPITOR) 20 MG tablet Take 20 mg by mouth daily       famotidine (PEPCID) 20 MG tablet Take 20 mg by mouth      LORazepam (ATIVAN) 0.5 MG tablet Take 0.5 mg by mouth as needed  2    metoprolol (TOPROL-XL) 25 MG 24 hr tablet   0     vitamin D3 (CHOLECALCIFEROL) 50 mcg (2000 units) tablet Take 1 tablet (50 mcg) by mouth daily (Patient not taking: Reported on 10/30/2024) 90 tablet 4     No current facility-administered medications for this visit.        Allergies:     Allergies   Allergen Reactions    Iron Dextran Shortness Of Breath    Doxycycline Other (See Comments)     High blood pressure    Cipro [Ciprofloxacin] Nausea    Dextran Other (See Comments)     Other reaction(s): Adverse reaction    Liquid Adhesive Itching    Sulphadimidine [Sulfa Antibiotics] Nausea    Adhesive Tape Rash        Review of Systems:   As above     Physical Exam:   Vitals: LMP 10/04/2013    No examination given nature of virtual visit     Data reviewed on previous visits    Imaging:  MRI lumbar spine 1/13/2021       MRI thoracic 1/7/2021       MRI cervical 1/7/2021       MRI orbits 1/7/2021       MRI brain 1/7/2021       Procedures:  EMG at [facility] reportedly normal  Labs 3/2021- unrevealing A1c, B12, B6, ELP, ESR, CRP, YULY, SSA/SSB, TTG Ab, TSH / Possible very small monoclonal IgG immunoglobulin of lambda light on immunofixation  chain type    MRI brain 1/2024  Impression: Essentially normal brain MRI. Inflammatory paranasal sinus  disease prominent at the ethmoidal cells.  An 0.9 cm of calcification along the  falx, may represent calcified meningioma.      MRI brain 2016  FINDINGS: The ventricles remain normal in size and configuration. Normal brain volume.   No intra-axial mass, edema, hemorrhage or parenchymal inflammation. No infarct or encephalomalacia. No developmental anomaly.   Small focus of T2 signal hypointensity along left posterior falx unchanged, probably dystrophic calcification.   No abnormal enhancement after contrast.   Fairly extensive inflammatory membrane thickening and fluid in the paranasal sinuses. Mastoids are clear. Arterial flow voids are maintained. No skull lesion or orbital abnormality.         Pertinent Investigations since last visit:    MRI lumbar  IMPRESSION:  Stable disc bulge at L4-5 with mild spinal canal stenosis and  posterior annular fissuring.

## 2025-01-21 ENCOUNTER — VIRTUAL VISIT (OUTPATIENT)
Dept: NEUROLOGY | Facility: CLINIC | Age: 60
End: 2025-01-21
Payer: COMMERCIAL

## 2025-01-21 DIAGNOSIS — M54.2 NECK PAIN: ICD-10-CM

## 2025-01-21 DIAGNOSIS — M54.16 LUMBAR BACK PAIN WITH RADICULOPATHY AFFECTING RIGHT LOWER EXTREMITY: Primary | ICD-10-CM

## 2025-01-21 DIAGNOSIS — G93.89 CALCIFICATION OF BRAIN: ICD-10-CM

## 2025-01-21 NOTE — LETTER
1/21/2025      Carolyn Briones  [address]  [facility] [address]      Dear Colleague,    Thank you for referring your patient, Carolyn Briones, to the [facility] [place]. Please see a copy of my visit note below.    [facility] Neurology Follow Up Visit    Carolyn Briones MRN# [id number]   Age: 59 year old YOB: 1965     Brief history of symptoms: The patient was initially seen in neurologic consultation on 3/12/2021 for evaluation of parasthesias. Please see the comprehensive neurologic consultation note from that date in the Epic records for details.          Assessment and Plan:   Assessment:  Carolyn Briones is a 59 year old female who presents today for follow-up of abnormal sensations. Prior work-up with MRI brain, cervical, thoracic, and lumbar, EMG, and skin biopsy did not show an etiology of abnormal sensations.     MRI lumbar spine was ordered for right leg pains. This was reviewed and is stable compared to 2021. Spine referral could be considered if right leg pains worsen.     Patient reports new neck pain over the last 2 months. MRI cervical spine ordered to evaluate for structural abnormality contributing to symptoms.     MRI brain also ordered to follow-up prior non specific calcification seen on imaging.     Plan:  - MRI brain with and without contrast  - MRI cervical without contrast    Follow up in Neurology clinic pending above    Trenton Rodriguez MD   of Neurology  [facility]  --------------------------------------------------------------------------    Interval history:   Patient still has right leg sciatica (feels like a tooth ache). It radiates into the right thigh.It is not an every day thing and she will monitor for now.       Past Medical History:     Patient Active Problem List   Diagnosis     Anemia, iron deficiency     CARDIOVASCULAR SCREENING; LDL GOAL LESS THAN 160     Hand pain     Hypovitaminosis D     Female stress  incontinence     Iron deficiency anemia due to chronic blood loss     Perimenopause     Stress at home     Thyroid nodule     Neck pain     Empty sella     Other fatigue     Disturbance of skin sensation     Dorsocervical fat pad     High serum cortisol     Post-menopausal     Anxiety about health     Past Medical History:   Diagnosis Date     Anemia      Empty sella 2005    only partially empty; possibly normal vaiant     Exposure to other ionizing radiation, subsequent encounter 1986    [place]     GERD (gastroesophageal reflux disease)      Hypercholesterolemia      Hypertension      Hypovitaminosis D 2020     Infection due to 2019 novel coronavirus 08/12/2022     Iron deficiency      Multiple thyroid nodules 2005    subcm     TESSA (obstructive sleep apnea) 11/2022     Sialoadenitis 2023        Past Surgical History:     Past Surgical History:   Procedure Laterality Date     APPENDECTOMY       COLONOSCOPY       DILATION AND CURETTAGE SUCTION  11/13/2012    Procedure: DILATION AND CURETTAGE SUCTION;  Suction Dilation & Curettage    (8 weeks);  Surgeon: Ken Rose MD;  [facility]     EXCISE GANGLION WRIST  4/10/2014    Procedure: EXCISE GANGLION WRIST;  Excision Ganglion Cyst, Left Wrist;  Surgeon: Ashley Garcia MD;  [facility]     OPERATIVE HYSTEROSCOPY WITH MORCELLATOR  1/19/2012    Procedure:OPERATIVE HYSTEROSCOPY WITH MORCELLATOR; Hysteroscopy & Polypectomy With Morcellator; Surgeon:KEN ROSE; [facility]        Social History:     Social History     Tobacco Use     Smoking status: Never     Smokeless tobacco: Never   Substance Use Topics     Alcohol use: No     Drug use: No        Family History:     Family History   Problem Relation Age of Onset     Lipids Mother      Cancer Mother         desmoplastic mesothelioma     C.A.D. Father         CABG age 65     Thyroid Disease No family hx of      Thyroid Cancer No family hx of         Medications:     Current Outpatient Medications    Medication Sig Dispense Refill     atorvastatin (LIPITOR) 20 MG tablet Take 20 mg by mouth daily        famotidine (PEPCID) 20 MG tablet Take 20 mg by mouth       LORazepam (ATIVAN) 0.5 MG tablet Take 0.5 mg by mouth as needed  2     metoprolol (TOPROL-XL) 25 MG 24 hr tablet   0     vitamin D3 (CHOLECALCIFEROL) 50 mcg (2000 units) tablet Take 1 tablet (50 mcg) by mouth daily (Patient not taking: Reported on 10/30/2024) 90 tablet 4     No current facility-administered medications for this visit.        Allergies:     Allergies   Allergen Reactions     Iron Dextran Shortness Of Breath     Doxycycline Other (See Comments)     High blood pressure     Cipro [Ciprofloxacin] Nausea     Dextran Other (See Comments)     Other reaction(s): Adverse reaction     Liquid Adhesive Itching     Sulphadimidine [Sulfa Antibiotics] Nausea     Adhesive Tape Rash        Review of Systems:   As above     Physical Exam:   Vitals: LMP 10/04/2013    No examination given nature of virtual visit     Data reviewed on previous visits    Imaging:  MRI lumbar spine 1/13/2021       MRI thoracic 1/7/2021       MRI cervical 1/7/2021       MRI orbits 1/7/2021       MRI brain 1/7/2021       Procedures:  EMG at [facility] reportedly normal  Labs 3/2021- unrevealing A1c, B12, B6, ELP, ESR, CRP, YULY, SSA/SSB, TTG Ab, TSH / Possible very small monoclonal IgG immunoglobulin of lambda light on immunofixation  chain type    MRI brain 1/2024  Impression: Essentially normal brain MRI. Inflammatory paranasal sinus  disease prominent at the ethmoidal cells.  An 0.9 cm of calcification along the  falx, may represent calcified meningioma.      MRI brain 2016  FINDINGS: The ventricles remain normal in size and configuration. Normal brain volume.   No intra-axial mass, edema, hemorrhage or parenchymal inflammation. No infarct or encephalomalacia. No developmental anomaly.   Small focus of T2 signal hypointensity along left posterior falx unchanged, probably dystrophic  calcification.   No abnormal enhancement after contrast.   Fairly extensive inflammatory membrane thickening and fluid in the paranasal sinuses. Mastoids are clear. Arterial flow voids are maintained. No skull lesion or orbital abnormality.         Pertinent Investigations since last visit:   MRI lumbar  IMPRESSION:  Stable disc bulge at L4-5 with mild spinal canal stenosis and  posterior annular fissuring.        Carolyn is a 59 year old who is being evaluated via a billable video visit.    How would you like to obtain your AVS? MyChart  If the video visit is dropped, the invitation should be resent by: Text to cell phone: [phone]  Will anyone else be joining your video visit? No    Video-Visit Details    Type of service:  Video Visit   Originating Location (pt. Location):     Distant Location (provider location):    Platform used for Video Visit:   MARIZA Rodriguez, [facility] ([facility])        Again, thank you for allowing me to participate in the care of your patient.        Sincerely,    Trenton Rodriguez MD    Electronically signed

## 2025-01-21 NOTE — PROGRESS NOTES
Carolyn is a 59 year old who is being evaluated via a billable video visit.    How would you like to obtain your AVS? MyChart  If the video visit is dropped, the invitation should be resent by: Text to cell phone: [phone]  Will anyone else be joining your video visit? No    Video-Visit Details    Type of service:  Video Visit   Originating Location (pt. Location):     Distant Location (provider location):    Platform used for Video Visit:   MARIZA Rodriguez, MARIA ESTHER ([facility])

## 2025-01-28 ENCOUNTER — MYC MEDICAL ADVICE (OUTPATIENT)
Dept: ENDOCRINOLOGY | Facility: CLINIC | Age: 60
End: 2025-01-28
Payer: COMMERCIAL

## 2025-01-29 NOTE — TELEPHONE ENCOUNTER
[facility] Dr Fink notes to return if symptoms persist or worsen and with instructions to call to schedule first available, report any symptoms, and ask to be placed on fast pass wait list. Number provided.   Tabby Menjivar, RN on 1/29/2025 at 10:39 AM           Patient calling upset that no one called her. Patient has been waiting for return call since January 10th. Please call her to discuss.   Writer offered to schedule next available in person or video and it's January 2026 and patient didn't feel comfortable scheduling that far at this time.          Note     Carolyn Briones [phone]  Mel Hwang2 days ago     Suzan Gutierres RN2 weeks ago     CS  I don't think you can its thyroid nodule.She will have to take first available and be placed on fast pass.  Suzan Gutierres RN on 1/10/2025 at 12:16 PM            Note     You  Cierra Fink MD2 weeks ago     DM  May we use a BREN or is next available ok? Thank you.     Jes Nance routed conversation to [facility] [place]     Jes Nance G2 weeks ago     KM  Patient called back, she will be away to travel for work, no other appts till next year, she wants a in person appt, can we use a BREN slot?  Please call.          AVS  Instructions      Return if symptoms worsen or fail to improve.  You had normal thyroid blood level 12/2023     Add vitamin D3 2000 international unit(s)/day (50 mcg/day ).      Midnight salivary cortisol at your convenience.  Make sure you are in normal circadian rhythm, not too close to travel across time zones.   Labs today     Thyroid US at your earliest convenience   No need to repeat pituitary MRI for diagnosis partially empty sella

## 2025-01-30 NOTE — TELEPHONE ENCOUNTER
Pt was scheduled first available and placed on wait list. Pt did want to mention she was still having the same symptoms as before, as well as palpitations and has lost 10 pounds within 3 months. Pt states she is concerned. Please advise.

## 2025-02-04 ENCOUNTER — ANCILLARY PROCEDURE (OUTPATIENT)
Dept: MAMMOGRAPHY | Facility: CLINIC | Age: 60
End: 2025-02-04
Attending: PHYSICIAN ASSISTANT
Payer: COMMERCIAL

## 2025-02-04 ENCOUNTER — ANCILLARY PROCEDURE (OUTPATIENT)
Dept: ULTRASOUND IMAGING | Facility: CLINIC | Age: 60
End: 2025-02-04
Attending: PHYSICIAN ASSISTANT
Payer: COMMERCIAL

## 2025-02-04 DIAGNOSIS — N64.4 BREAST PAIN, LEFT: ICD-10-CM

## 2025-02-04 PROCEDURE — G0279 TOMOSYNTHESIS, MAMMO: HCPCS | Performed by: RADIOLOGY

## 2025-02-04 PROCEDURE — 77065 DX MAMMO INCL CAD UNI: CPT | Mod: LT | Performed by: RADIOLOGY

## 2025-02-04 PROCEDURE — 76642 ULTRASOUND BREAST LIMITED: CPT | Mod: LT | Performed by: RADIOLOGY

## 2025-03-18 ENCOUNTER — ANCILLARY PROCEDURE (OUTPATIENT)
Dept: MRI IMAGING | Facility: CLINIC | Age: 60
End: 2025-03-18
Attending: INTERNAL MEDICINE
Payer: COMMERCIAL

## 2025-03-18 DIAGNOSIS — G93.89 CALCIFICATION OF BRAIN: ICD-10-CM

## 2025-03-18 DIAGNOSIS — M54.2 NECK PAIN: ICD-10-CM

## 2025-03-18 PROCEDURE — 70553 MRI BRAIN STEM W/O & W/DYE: CPT | Performed by: RADIOLOGY

## 2025-03-18 PROCEDURE — A9585 GADOBUTROL INJECTION: HCPCS | Performed by: RADIOLOGY

## 2025-03-18 PROCEDURE — 72141 MRI NECK SPINE W/O DYE: CPT | Performed by: RADIOLOGY

## 2025-03-18 RX ORDER — GADOBUTROL 604.72 MG/ML
6.5 INJECTION INTRAVENOUS ONCE
OUTPATIENT
Start: 2025-03-18 | End: 2025-03-18

## 2025-03-18 RX ORDER — GADOBUTROL 604.72 MG/ML
6.5 INJECTION INTRAVENOUS ONCE
Status: COMPLETED | OUTPATIENT
Start: 2025-03-18 | End: 2025-03-18

## 2025-03-18 RX ADMIN — GADOBUTROL 6.5 ML: 604.72 INJECTION INTRAVENOUS at 10:46

## 2025-03-31 ENCOUNTER — TELEPHONE (OUTPATIENT)
Dept: NEUROLOGY | Facility: CLINIC | Age: 60
End: 2025-03-31
Payer: COMMERCIAL

## 2025-03-31 NOTE — TELEPHONE ENCOUNTER
Spoke with patient regarding incident in MRI, patient is doing well, no further action is needed.     Steven Moses, EMT

## 2025-04-01 ENCOUNTER — TELEPHONE (OUTPATIENT)
Dept: NEUROSURGERY | Facility: CLINIC | Age: 60
End: 2025-04-01
Payer: COMMERCIAL

## 2025-04-01 NOTE — TELEPHONE ENCOUNTER
Zelalem Leon,     The MRI of your neck does not show any concerning abnormalities. There are some mild arthritis changes. If you are having bothersome neck pain, let us know if you'd like to see a physical therapist or a spine doctor.     Regards,  MD Zelalem Baig,     Your MRI brain does not show any concerning abnormalities.     Regards,  Trenton Rodriguez MD

## 2025-04-02 ENCOUNTER — TRANSCRIBE ORDERS (OUTPATIENT)
Dept: OTHER | Age: 60
End: 2025-04-02

## 2025-04-02 DIAGNOSIS — G60.9 IDIOPATHIC PERIPHERAL NEUROPATHY: Primary | ICD-10-CM

## 2025-04-06 NOTE — PROGRESS NOTES
04/07/25      David Stone  [address] [address]      To whom it may concern,    This letter is to certify that David Stone was admitted to [facility] from 4/6/2025-4/7/2025. Please excuse his absence from work during this time.     Sincerely,     Jud Rhodes NP    [address] [address]  Phone: [phone]  Fax: [phone]             [facility] Neurology Follow Up Visit    Carolyn Briones MRN# [id number]   Age: 58 year old YOB: 1965     Brief history of symptoms: The patient was initially seen in neurologic consultation on 3/12/2021 for evaluation of parasthesias. Please see the comprehensive neurologic consultation note from that date in the Epic records for details.          Assessment and Plan:   Assessment:  Carolyn Birones is a 58 year old female who presents today for follow-up of abnormal sensations. Prior work-up with MRI brain, cervical, thoracic, and lumbar, EMG, and skin biopsy did not show an etiology of abnormal sensations.     Patient reports new radiating right leg pain over the last several months. She will consider PT referral. MRI lumbar spine ordered to evaluate for structural abnormality contributing to symptoms.     Plan:  - MRI lumbar spine without contrast    Follow up in Neurology clinic pending above    Trenton Rodriguez MD   of Neurology  [facility]  --------------------------------------------------------------------------    Interval history:   Patient reports continued cold sensations and burning sensations in the feet. She also feels crawling sensations.     She feels pins and needles in the left hand at times. She would like to monitor this.       Past Medical History:     Patient Active Problem List   Diagnosis    Anemia, iron deficiency    CARDIOVASCULAR SCREENING; LDL GOAL LESS THAN 160    Hand pain    Hypovitaminosis D    Female stress incontinence    Iron deficiency anemia due to chronic blood loss    Perimenopause    Stress at home    Thyroid nodule    Neck pain    Empty sella (H)    Other fatigue    Disturbance of skin sensation    Dorsocervical fat pad    High serum cortisol    Post-menopausal    Anxiety about health     Past Medical History:   Diagnosis Date    Anemia     Empty sella (H) 2005    only partially empty; possibly normal vaiant    Exposure to other ionizing  radiation, subsequent encounter 1986    [place]    GERD (gastroesophageal reflux disease)     Hypercholesterolemia     Hypertension     Hypovitaminosis D 2020    Infection due to 2019 novel coronavirus 08/12/2022    Iron deficiency     Multiple thyroid nodules 2005    subcm    TESSA (obstructive sleep apnea) 11/2022    Sialoadenitis 2023        Past Surgical History:     Past Surgical History:   Procedure Laterality Date    APPENDECTOMY      COLONOSCOPY      DILATION AND CURETTAGE SUCTION  11/13/2012    Procedure: DILATION AND CURETTAGE SUCTION;  Suction Dilation & Curettage    (8 weeks);  Surgeon: Ken Rose MD;  [facility]    EXCISE GANGLION WRIST  4/10/2014    Procedure: EXCISE GANGLION WRIST;  Excision Ganglion Cyst, Left Wrist;  Surgeon: Ashley Garcia MD;  [facility]    OPERATIVE HYSTEROSCOPY WITH MORCELLATOR  1/19/2012    Procedure:OPERATIVE HYSTEROSCOPY WITH MORCELLATOR; Hysteroscopy & Polypectomy With Morcellator; Surgeon:KEN ROSE; [facility]        Social History:     Social History     Tobacco Use    Smoking status: Never    Smokeless tobacco: Never   Substance Use Topics    Alcohol use: No    Drug use: No        Family History:     Family History   Problem Relation Age of Onset    Lipids Mother     Cancer Mother         desmoplastic mesothelioma    C.A.D. Father         CABG age 65    Thyroid Disease No family hx of     Thyroid Cancer No family hx of         Medications:     Current Outpatient Medications   Medication Sig Dispense Refill    atorvastatin (LIPITOR) 20 MG tablet Take 20 mg by mouth daily       famotidine (PEPCID) 20 MG tablet Take 20 mg by mouth      LORazepam (ATIVAN) 0.5 MG tablet Take 0.5 mg by mouth as needed  2    metoprolol (TOPROL-XL) 25 MG 24 hr tablet   0    vitamin D3 (CHOLECALCIFEROL) 50 mcg (2000 units) tablet Take 1 tablet (50 mcg) by mouth daily 90 tablet 4     No current facility-administered medications for this visit.        Allergies:      Allergies   Allergen Reactions    Iron Dextran Shortness Of Breath    Doxycycline Other (See Comments)     High blood pressure    Cipro [Ciprofloxacin] Nausea    Dextran Other (See Comments)     Other reaction(s): Adverse reaction    Liquid Adhesive Itching    Sulphadimidine [Sulfa Antibiotics] Nausea    Adhesive Tape Rash        Review of Systems:   As above     Physical Exam:   Vitals: LMP 10/04/2013    General: Seated comfortably in no acute distress.  Lungs: breathing comfortably  Neurologic:     Mental Status: Fully alert, attentive. Language normal, speech clear and fluent, no paraphasic errors.     Data reviewed on previous visits    Imaging:  MRI lumbar spine 1/13/2021       MRI thoracic 1/7/2021       MRI cervical 1/7/2021       MRI orbits 1/7/2021       MRI brain 1/7/2021       Procedures:  EMG 3 weeks ago at [facility] reportedly normal  Labs 3/2021- unrevealing A1c, B12, B6, ELP, ESR, CRP, YULY, SSA/SSB, TTG Ab, TSH / Possible very small monoclonal IgG immunoglobulin of lambda light on immunofixation  chain type    MRI brain 1/2024  Impression: Essentially normal brain MRI. Inflammatory paranasal sinus  disease prominent at the ethmoidal cells.  An 0.9 cm of calcification along the  falx, may represent calcified meningioma.      MRI brain 2016  FINDINGS: The ventricles remain normal in size and configuration. Normal brain volume.   No intra-axial mass, edema, hemorrhage or parenchymal inflammation. No infarct or encephalomalacia. No developmental anomaly.   Small focus of T2 signal hypointensity along left posterior falx unchanged, probably dystrophic calcification.   No abnormal enhancement after contrast.   Fairly extensive inflammatory membrane thickening and fluid in the paranasal sinuses. Mastoids are clear. Arterial flow voids are maintained. No skull lesion or orbital abnormality.     Pertinent Investigations since last visit:         The total time of this encounter today amounted to 30 minutes. This  time included time spent with the patient, prep work, ordering tests, and performing post visit documentation.

## 2025-05-07 ENCOUNTER — TRANSCRIBE ORDERS (OUTPATIENT)
Dept: OTHER | Age: 60
End: 2025-05-07

## 2025-05-07 DIAGNOSIS — G60.9 IDIOPATHIC PERIPHERAL NEUROPATHY: Primary | ICD-10-CM

## 2025-05-08 ENCOUNTER — PATIENT OUTREACH (OUTPATIENT)
Dept: CARE COORDINATION | Facility: CLINIC | Age: 60
End: 2025-05-08
Payer: COMMERCIAL

## 2025-05-12 ENCOUNTER — PATIENT OUTREACH (OUTPATIENT)
Dept: CARE COORDINATION | Facility: CLINIC | Age: 60
End: 2025-05-12
Payer: COMMERCIAL

## 2025-05-20 ENCOUNTER — TELEPHONE (OUTPATIENT)
Dept: NEUROLOGY | Facility: CLINIC | Age: 60
End: 2025-05-20
Payer: COMMERCIAL

## 2025-05-20 NOTE — TELEPHONE ENCOUNTER
Neuroscience Clinic Task Note    TASK    Return Call  Date/time 2025 at 1:19 PM   Reason for call Called and spoke with patient regarding [email or web address] message. She is unhappy that she got a letter stating she didn't schedule her neurology appointment. The referral we received should have been applied to her appointment in January with Dr. Rodriguez. She stated it was billed incorrectly as her previous referral was  when the visit occurred, but she had since obtained a new one with the correct dates. Writer explained her [employer] message was routed to Dr. Rodriguez's clinic team, and we would not be able to help with this. Offered patient phone number for billing office. Patient expressed frustration that [employer] is not user friendly and asked writer to call billing for her. Writer stated she would not be able to, but gave patient phone number and email for billing. No further concerns at this time.       FOLLOW-UP      ADDITIONAL COMMENTS      Jami Moon LPN

## 2025-06-09 ENCOUNTER — VIRTUAL VISIT (OUTPATIENT)
Dept: ENDOCRINOLOGY | Facility: CLINIC | Age: 60
End: 2025-06-09
Payer: COMMERCIAL

## 2025-06-09 DIAGNOSIS — E55.9 HYPOVITAMINOSIS D: ICD-10-CM

## 2025-06-09 DIAGNOSIS — R63.4 WEIGHT LOSS: ICD-10-CM

## 2025-06-09 DIAGNOSIS — Z78.0 POST-MENOPAUSAL: ICD-10-CM

## 2025-06-09 DIAGNOSIS — E04.1 THYROID NODULE: ICD-10-CM

## 2025-06-09 DIAGNOSIS — R45.89 ANXIETY ABOUT HEALTH: ICD-10-CM

## 2025-06-09 DIAGNOSIS — E65 DORSAL CERVICAL FAT PAD: Primary | ICD-10-CM

## 2025-06-09 PROCEDURE — 98007 SYNCH AUDIO-VIDEO EST HI 40: CPT

## 2025-06-09 PROCEDURE — G2211 COMPLEX E/M VISIT ADD ON: HCPCS

## 2025-06-09 PROCEDURE — 99417 PROLNG OP E/M EACH 15 MIN: CPT

## 2025-06-09 ASSESSMENT — PAIN SCALES - GENERAL: PAINLEVEL_OUTOF10: MODERATE PAIN (4)

## 2025-06-09 NOTE — NURSING NOTE
Current patient location: [address] [place] [id number]    Is the patient currently in the state of [place]? YES    Visit mode: VIDEO    If the visit is dropped, the patient can be reconnected by:VIDEO VISIT: Text to cell phone:   Telephone Information:   Mobile [phone]       Will anyone else be joining the visit? NO  (If patient encounters technical issues they should call [phone])    Are changes needed to the allergy or medication list? No    Are refills needed on medications prescribed by this physician? NO    Rooming Documentation:  Questionnaire(s) completed    Reason for visit: RECHECK    Cassy CHAUHAN

## 2025-06-09 NOTE — PATIENT INSTRUCTIONS
Midnight salivary cortisol at your convenience.  Make sure you are in normal circadian rhythm, not too close to travel across time zones.     Next thyroid US 2029       Please call and schedule at one of these locations that provide the service you need.     DEXA, CT, MRI, US, XRAY    [facility]   ([facility]) [phone]   [facility] ([place], [place]) [phone]   [facility] ([facility]) [phone]   [facility] ([facility]) [phone]

## 2025-06-09 NOTE — PROGRESS NOTES
Endocrinology note    Virtual Visit Details    Type of service:  Video Visit     Originating Location (pt. Location): Home    Assessment   1.  dorsal cervical hump  is clearly seen on the c spine MRI .    Midnight salivary cortisol- ordered at last appt, not done then, will try again    Weight loss - she has had a lot of recent tests that were normal.  We will repeat a few and expand.  Doubt endocrine etiology    Post menopausal with low BMD by report.  I do not have the DXA data.  Agree, based on the reported arin, that next DXA should be 2 years after the last one, on the same machine .     Nodular thyroid in patient with history of possibly important radiation exposures before age 21- stable subcm nodules on past imaging.     History of radiation exposure -[place] exposure while living in [facility] in 1986. As per # 1  Next US 2029 or after     Hypovitaminosis D in the past, past Rx vitamin D 2000 international unit(s)/day.   She is not on vitamin D.    Labs     Anxiety about health      The Longitudinal plan of care for x  was addressed during this visit. Due to added complexity of care, we will continue to support Carolyn , and the subsequent management of this condition(s) and with the ongoing continuity of care of this condition.    Due to the continued risks of COVID 19 transmission and to improve ease of access this visit was a telephone/video visit.   The patient gave verbal consent for the visit today.    I have independently reviewed and interpreted labs, imaging as indicated.     Distant Location (provider location):  Off-site  Mode of Communication:  Video Conference via [employer]  Chart review/prep time 1  [id number]; 1035 -1053; [id number]; 1242   Visit Start time  1458   Visit Stop time  1515   _59_ I spent minutes spent on the date of the encounter doing chart review, history and exam, documentation and further activities as noted above, exclusive of CGM reading time .  Cierra Fink MD    HPI    Carolyn presents for  patient initiated follow up of subcm thyroid nodules, partially empty sella, history of [place]ob[place] exposure age 19-21.  She also has had low BMD (managed by her PCP), hypovitaminosis D.  I last saw her 1/2024.  Since then, she has had thyroid US and she also had recent C spine MRI which saw the thyroid .  The labs we discussed at the last 2 appts have not been done.   At our last appt I recommended she add vitamin D 2000 international unit(s)/day. Since our last appt she has seen ENT and GI at [place] as well as primary care- I have reviewed care everywhere and the media tab scanned records  .  The 5/9/25 [facility] [employer] note states she was not on vitamin D at the time.  The main concern today is about weight loss.  She says [facility] told her to see endocrine which is why she is here. Weight loss is unintentional since 10/2024 - used to be 145 in October, 2024, got to 130 in April.  She hasn't recently weighed herself.    Chernobyl exposure was at age 19-21 in 1986  while  living in [facility]  ([place] miles from [facility]). She continued to live there another 4.5 years.  She recalls taking some kind of medication for her thyroid then    The thyroid has been followed with US and periodic FNAB for many years.  She  had thyroid nodule FNAB in 2005 at [place] - benign by history (we do not have the actual cytology report).  6-7 mm hypoechoic nodule on the right had benign cytology by past US guided FNAB  ( 1/12).  Most recent US 2/20/24  was stable    See my 2024 appt for additional  labs 2017 to present  2/18/05 [facility]  VIDHI negative; microsomal antibody negative, Tg 22.1, VIDHI < 2  2/6/14: Tg 8.1, VIDHI < 0.4, TSH 0.94  9/27/16: Tg 9.9, VIDHI < 0.4, THS 1.29-   8/21/18: Tg  7.8, VIDHI < 0.4, TSH 0.52, free T4 1.03  4/9/2021 1758 Tg 17.6, IVDHI < 0.4, .7, prolactin 5   4/28/2021 0855 cortisol 22.6  7/7/2021 [place] IgA 209, celiac serology negative   7/9/2021 0851 cortisol 21.3  7/13/2021 urine free cortisol 22.1  mcg/day (< 45), urine creatinine 1326 mg, urine volume 2410 ml   8/5/21 1759 ACTH < 10, cortisol 5.4  6/23/22 [facility]  TPO < 3, TSH 0.7  12/21/23 [place] TSH 0.6, 25OHD 17; B12 271 ; SPEP  no monoclonal   1/31/24 Tg 13.8 (concurrent 20.9 from 2021) , VIDHI < 0.4   4/23/25 [place] TSH 1.5 , free T4 1.1 , Hgb 14.1, platelets 293, IgA 207, jayjay serology negative  3/14/25 duodenal biopsy raised possibility of celiac disease  5/14/25 B12 223, ferritin 110      images on PACS  2005 Brain MRI  possible partially empty sella.  1/17/2024 MRI brain - partially empty sella per radiologist - I am not convinced - could simply be normal pituitary  2/20/24 thyroid US compared with 4/22/2021, 8/30/18 , 10/27/16 and 2/6/14  Right # 1 superior posterior 1 x 0.7 x 1.1 was Right # 2  1.2 x 0.8 x 1.3   Cystic was 0.8 x 0.5 x 0.9 cm 2018;  0.8 x 0.4 x 0.8 cm 2016, 0.7 x 0.4 x 0.7 cm 2014  )  Others not measured by tech; extracted from cine by me   Right lateral 0.3 x 0.3 x ?  Was Right # 3 mid  0.3 x 0.3 x 0.5 was 0.3 x0.2  X 0.5 2018 ; 0.3 x 0.3 x 0.4 2016; 0.3 x 0.2 x 0.3 2014  )  Right isthmus 0.4 x 0.3 was Right # 4  0.4 x 0.3 x  Was 0.4 x 0.3 x 0.4 cm inferior (was 0.5 x 0.3 x 0.5 cm 2016; 0.5 x 0.3 x 0.5 2014  3/18/25 MRI brain: pituitary appears normal to my eye  3/18/25 C spine : dorsal cervical fat pad is prominent; T2 hypointense right thyroid nodule 0.6 x 0.7 x     ROS   Eating despite stress.  Eats maybe 3 times/day and also snacks.    PCP retired a few months ago ;   Cardiac: sometimes ? Symptoms , racing, or a little pain  Respiratory:  negative ;  GI: many problems x years; fructose intolerance; eats a lot of carbs that her stomach tolerates - potatoes, bread butter.  Can't tolerate fruits and vegetables.  She doesn't avoid gluten; Mendez ivan told she doesn't have gluten sensitivity.  She buys gluten free bread ; indigestions; discomfort; no diarrhea or constipation;    No nocturia   No excessive thirst   Sleep issues x 6 years    Anxiety   Right shoulder pan x > 2 months   Sensations of crawling in feet     PMH   Past Medical History:   Diagnosis Date    Anemia     Empty sella 2005    only partially empty; possibly normal vaiant    Exposure to other ionizing radiation, subsequent encounter 1986    [place]    GERD (gastroesophageal reflux disease)     Hypercholesterolemia     Hypertension     Hypovitaminosis D 2020    Infection due to 2019 novel coronavirus 08/12/2022    Iron deficiency     Multiple thyroid nodules 2005    subcm    TESSA (obstructive sleep apnea) 11/2022    Sialoadenitis 2023     Past Surgical History:   Procedure Laterality Date    APPENDECTOMY      COLONOSCOPY      DILATION AND CURETTAGE SUCTION  11/13/2012    Procedure: DILATION AND CURETTAGE SUCTION;  Suction Dilation & Curettage    (8 weeks);  Surgeon: Ken Rose MD;  [facility]    EXCISE GANGLION WRIST  4/10/2014    Procedure: EXCISE GANGLION WRIST;  Excision Ganglion Cyst, Left Wrist;  Surgeon: Ashley Garcia MD;  [facility]    OPERATIVE HYSTEROSCOPY WITH MORCELLATOR  1/19/2012    Procedure:OPERATIVE HYSTEROSCOPY WITH MORCELLATOR; Hysteroscopy & Polypectomy With Morcellator; Surgeon:KEN ROSE; [facility]     Past Surgical History:   Procedure Laterality Date    APPENDECTOMY      COLONOSCOPY      DILATION AND CURETTAGE SUCTION  11/13/2012    Procedure: DILATION AND CURETTAGE SUCTION;  Suction Dilation & Curettage    (8 weeks);  Surgeon: Ken Rose MD;  [facility]    EXCISE GANGLION WRIST  4/10/2014    Procedure: EXCISE GANGLION WRIST;  Excision Ganglion Cyst, Left Wrist;  Surgeon: Ashley Garcia MD;  [facility]    OPERATIVE HYSTEROSCOPY WITH MORCELLATOR  1/19/2012    Procedure:OPERATIVE HYSTEROSCOPY WITH MORCELLATOR; Hysteroscopy & Polypectomy With Morcellator; Surgeon:KEN ROSE; [facility]       Current Outpatient Medications   Medication Sig Dispense Refill    atorvastatin (LIPITOR) 20 MG tablet Take 20 mg by  "mouth daily       famotidine (PEPCID) 20 MG tablet Take 20 mg by mouth      LORazepam (ATIVAN) 0.5 MG tablet Take 0.5 mg by mouth as needed  2    metoprolol (TOPROL-XL) 25 MG 24 hr tablet   0    vitamin D3 (CHOLECALCIFEROL) 50 mcg (2000 units) tablet Take 1 tablet (50 mcg) by mouth daily (Patient not taking: Reported on 1/21/2025) 90 tablet 4     Family History   Problem Relation Age of Onset    Lipids Mother     Cancer Mother         desmoplastic mesothelioma    C.A.D. Father         CABG age 65    Thyroid Disease No family hx of     Thyroid Cancer No family hx of      Personal Hx   Behavioral history: No tobacco use.   Home environment: No secondhand tobacco smoke in home.     in [facility];     Physical Exam   GENERAL: no distress; in a car  BP Readings from Last 1 Encounters:   10/30/24 107/67      Pulse Readings from Last 1 Encounters:   10/30/24 66      Resp Readings from Last 1 Encounters:   01/27/22 18      Temp Readings from Last 1 Encounters:   03/20/24 97.8  F (36.6  C) (Oral)      SpO2 Readings from Last 1 Encounters:   01/31/24 94%      Wt Readings from Last 1 Encounters:   10/30/24 64 kg (141 lb)      Ht Readings from Last 1 Encounters:   03/20/24 1.613 m (5' 3.5\")     SKIN: Visible skin clear. No significant rash, abnormal pigmentation or lesions.  EYES: glasses;  Eyes grossly normal to inspection.    NECK: no visible masses; no grossly visible goiter  RESP: No audible wheeze, cough, or increased work of breathing.    NEURO: Awake, alert, responds appropriately to questions.  Mentation and speech fluent.  PSYCH:affect normal,and appearance well-groomed.    DATA      US THYROID 2/20/2024 5:40 PM   COMPARISON: 4/22/2021  HISTORY: Thyroid nodule  FINDINGS:   Thyroid parenchyma: homogenous  The right lobe of the thyroid measures: 5.4 x 2.1 x 1.8 cm  The left lobe of the thyroid measures: 4.8 x 1.6 x 1.2  The thyroid isthmus measures: 0.5 cm  Right Lobe:  Nodule 1: (Previously #2 on " 4/22/2021)  Location: Mid right lobe  Size: 1.1 x 1.0 x 0.8 cm, previously 1.3 x 1.2 x 0.8 cm on 4/22/2021  Composition: Spongiform (0 points)  Echogenicity: Anechoic (0 points)  Shape: Wider than tall (0 points)  Margin: Smooth (0 points)  Echogenic Foci: None or large comet tail artifact (0 points)  Stability: No significant change in size  TIRADS: TR1 (0 points) Benign  Remaining tiny cystic/spongiform nodules do not meet imaging criteria  based on size.  Left Lobe: No nodule                                                              Impression:  Benign appearing solitary nodule in the right lobe  without significant change.  Remaining tiny cystic nodules do not meet  imaging criteria based on size.     TAY BYERS MD

## 2025-06-09 NOTE — LETTER
6/9/2025       RE: Carolyn Briones  [address]  [place] [address]     Dear Colleague,    Thank you for referring your patient, Carolyn Briones, to the [facility] [place] [facility]. Please see a copy of my visit note below.    Endocrinology note    Virtual Visit Details    Type of service:  Video Visit     Originating Location (pt. Location): Home    Assessment   1.  dorsal cervical hump  is clearly seen on the c spine MRI .    Midnight salivary cortisol- ordered at last appt, not done then, will try again    Weight loss - she has had a lot of recent tests that were normal.  We will repeat a few and expand.  Doubt endocrine etiology    Post menopausal with low BMD by report.  I do not have the DXA data.  Agree, based on the reported arin, that next DXA should be 2 years after the last one, on the same machine .     Nodular thyroid in patient with history of possibly important radiation exposures before age 21- stable subcm nodules on past imaging.     History of radiation exposure -[place] exposure while living in [facility] in [place]. As per # 1  Next US 2029 or after     Hypovitaminosis D in the past, past Rx vitamin D 2000 international unit(s)/day.   She is not on vitamin D.    Labs     Anxiety about health      The Longitudinal plan of care for x  was addressed during this visit. Due to added complexity of care, we will continue to support Carolyn , and the subsequent management of this condition(s) and with the ongoing continuity of care of this condition.    Due to the continued risks of COVID 19 transmission and to improve ease of access this visit was a telephone/video visit.   The patient gave verbal consent for the visit today.    I have independently reviewed and interpreted labs, imaging as indicated.     Distant Location (provider location):  Off-site  Mode of Communication:  Video Conference via [employer]  review/prep time 1  [id number]; 1035 -1053; [id number]; 1242   Visit Start time  1458   Visit Stop time  1515   _59_ I spent minutes spent on the date of the encounter doing chart review, history and exam, documentation and further activities as noted above, exclusive of CGM reading time .  Cierra Fink MD    CAMERON Leon presents for  patient initiated follow up of subcm thyroid nodules, partially empty sella, history of [place] exposure age [age].  She also has had low BMD (managed by her PCP), hypovitaminosis D.  I last saw her 1/2024.  Since then, she has had thyroid US and she also had recent C spine MRI which saw the thyroid .  The labs we discussed at the last 2 appts have not been done.   At our last appt I recommended she add vitamin D 2000 international unit(s)/day. Since our last appt she has seen ENT and GI at [facility] as well as primary care- I have reviewed care everywhere and the media tab scanned records  .  The 5/9/25 [employer] health note states she was not on vitamin D at the time.  The main concern today is about weight loss.  She says [facility] told her to see endocrine which is why she is here. Weight loss is unintentional since 10/2024 - used to be 145 in October, 2024, got to 130 in April.  She hasn't recently weighed herself.    [place]obyl exposure was at age 20-20 in 1986  while  living in [facility]  ([place] miles from [facility]). She continued to live there another 4.5 years.  She recalls taking some kind of medication for her thyroid then    The thyroid has been followed with US and periodic FNAB for many years.  She  had thyroid nodule FNAB in 2005 at [place] - benign by history (we do not have the actual cytology report).  6-7 mm hypoechoic nodule on the right had benign cytology by past US guided FNAB  ( 1/12).  Most recent US 2/20/24  was stable    See my 2024 appt for additional  labs 2017 to present  2/18/05 [facility]  VIDHI negative; microsomal antibody negative, Tg 22.1, VIDHI < 2  2/6/14: Tg 8.1, VIDHI  < 0.4, TSH 0.94  9/27/16: Tg 9.9, VIDHI < 0.4, THS 1.29-   8/21/18: Tg  7.8, VIDHI < 0.4, TSH 0.52, free T4 1.03  4/9/2021 1758 Tg 17.6, VIDHI < 0.4, .7, prolactin 5   4/28/2021 0855 cortisol 22.6  7/7/2021 [place] IgA 209, celiac serology negative   7/9/2021 0851 cortisol 21.3  7/13/2021 urine free cortisol 22.1 mcg/day (< 45), urine creatinine 1326 mg, urine volume 2410 ml   8/5/21 1759 ACTH < 10, cortisol 5.4  6/23/22 [facility]  TPO < 3, TSH 0.7  12/21/23 [place] TSH 0.6, 25OHD 17; B12 271 ; SPEP  no monoclonal   1/31/24 Tg 13.8 (concurrent 20.9 from 2021) , VIDHI < 0.4   4/23/25 [place] TSH 1.5 , free T4 1.1 , Hgb 14.1, platelets 293, IgA 207, jayjay serology negative  3/14/25 duodenal biopsy raised possibility of celiac disease  5/14/25 B12 223, ferritin 110      images on PACS  2005 Brain MRI  possible partially empty sella.  1/17/2024 MRI brain - partially empty sella per radiologist - I am not convinced - could simply be normal pituitary  2/20/24 thyroid US compared with 4/22/2021, 8/30/18 , 10/27/16 and 2/6/14  Right # 1 superior posterior 1 x 0.7 x 1.1 was Right # 2  1.2 x 0.8 x 1.3   Cystic was 0.8 x 0.5 x 0.9 cm 2018;  0.8 x 0.4 x 0.8 cm 2016, 0.7 x 0.4 x 0.7 cm 2014  )  Others not measured by tech; extracted from cine by me   Right lateral 0.3 x 0.3 x ?  Was Right # 3 mid  0.3 x 0.3 x 0.5 was 0.3 x0.2  X 0.5 2018 ; 0.3 x 0.3 x 0.4 2016; 0.3 x 0.2 x 0.3 2014  )  Right isthmus 0.4 x 0.3 was Right # 4  0.4 x 0.3 x  Was 0.4 x 0.3 x 0.4 cm inferior (was 0.5 x 0.3 x 0.5 cm 2016; 0.5 x 0.3 x 0.5 2014  3/18/25 MRI brain: pituitary appears normal to my eye  3/18/25 C spine : dorsal cervical fat pad is prominent; T2 hypointense right thyroid nodule 0.6 x 0.7 x     ROS   Eating despite stress.  Eats maybe 3 times/day and also snacks.    PCP retired a few months ago ;   Cardiac: sometimes ? Symptoms , racing, or a little pain  Respiratory:  negative ;  GI: many problems x years; fructose intolerance; eats a lot of carbs that her  stomach tolerates - potatoes, bread butter.  Can't tolerate fruits and vegetables.  She doesn't avoid gluten; Sh ewas told she doesn't have gluten sensitivity.  She buys gluten free bread ; indigestions; discomfort; no diarrhea or constipation;    No nocturia   No excessive thirst   Sleep issues x 6 years   Anxiety   Right shoulder pan x > 2 months   Sensations of crawling in feet     PMH   Past Medical History:   Diagnosis Date     Anemia      Empty sella 2005    only partially empty; possibly normal vaiant     Exposure to other ionizing radiation, subsequent encounter 1986    [place]     GERD (gastroesophageal reflux disease)      Hypercholesterolemia      Hypertension      Hypovitaminosis D 2020     Infection due to 2019 novel coronavirus 08/12/2022     Iron deficiency      Multiple thyroid nodules 2005    subcm     TESSA (obstructive sleep apnea) 11/2022     Sialoadenitis 2023     Past Surgical History:   Procedure Laterality Date     APPENDECTOMY       COLONOSCOPY       DILATION AND CURETTAGE SUCTION  11/13/2012    Procedure: DILATION AND CURETTAGE SUCTION;  Suction Dilation & Curettage    (8 weeks);  Surgeon: Ken Rose MD;  [facility]     EXCISE GANGLION WRIST  4/10/2014    Procedure: EXCISE GANGLION WRIST;  Excision Ganglion Cyst, Left Wrist;  Surgeon: Ashley Garcia MD;  [facility]     OPERATIVE HYSTEROSCOPY WITH MORCELLATOR  1/19/2012    Procedure:OPERATIVE HYSTEROSCOPY WITH MORCELLATOR; Hysteroscopy & Polypectomy With Morcellator; Surgeon:KEN ROSE; [facility]     Past Surgical History:   Procedure Laterality Date     APPENDECTOMY       COLONOSCOPY       DILATION AND CURETTAGE SUCTION  11/13/2012    Procedure: DILATION AND CURETTAGE SUCTION;  Suction Dilation & Curettage    (8 weeks);  Surgeon: Ken Rose MD;  [facility]     EXCISE GANGLION WRIST  4/10/2014    Procedure: EXCISE GANGLION WRIST;  Excision Ganglion Cyst, Left Wrist;  Surgeon: Ashley Garcia MD;   "[facility]     OPERATIVE HYSTEROSCOPY WITH MORCELLATOR  1/19/2012    Procedure:OPERATIVE HYSTEROSCOPY WITH MORCELLATOR; Hysteroscopy & Polypectomy With Morcellator; Surgeon:KEN ROSE; [facility]       Current Outpatient Medications   Medication Sig Dispense Refill     atorvastatin (LIPITOR) 20 MG tablet Take 20 mg by mouth daily        famotidine (PEPCID) 20 MG tablet Take 20 mg by mouth       LORazepam (ATIVAN) 0.5 MG tablet Take 0.5 mg by mouth as needed  2     metoprolol (TOPROL-XL) 25 MG 24 hr tablet   0     vitamin D3 (CHOLECALCIFEROL) 50 mcg (2000 units) tablet Take 1 tablet (50 mcg) by mouth daily (Patient not taking: Reported on 1/21/2025) 90 tablet 4     Family History   Problem Relation Age of Onset     Lipids Mother      Cancer Mother         desmoplastic mesothelioma     C.A.D. Father         CABG age 65     Thyroid Disease No family hx of      Thyroid Cancer No family hx of      Personal Hx   Behavioral history: No tobacco use.   Home environment: No secondhand tobacco smoke in home.     in [facility];     Physical Exam   GENERAL: no distress; in a car  BP Readings from Last 1 Encounters:   10/30/24 107/67      Pulse Readings from Last 1 Encounters:   10/30/24 66      Resp Readings from Last 1 Encounters:   01/27/22 18      Temp Readings from Last 1 Encounters:   03/20/24 97.8  F (36.6  C) (Oral)      SpO2 Readings from Last 1 Encounters:   01/31/24 94%      Wt Readings from Last 1 Encounters:   10/30/24 64 kg (141 lb)      Ht Readings from Last 1 Encounters:   03/20/24 1.613 m (5' 3.5\")     SKIN: Visible skin clear. No significant rash, abnormal pigmentation or lesions.  EYES: glasses;  Eyes grossly normal to inspection.    NECK: no visible masses; no grossly visible goiter  RESP: No audible wheeze, cough, or increased work of breathing.    NEURO: Awake, alert, responds appropriately to questions.  Mentation and speech fluent.  PSYCH:affect normal,and appearance " well-groomed.    DATA      US THYROID 2/20/2024 5:40 PM   COMPARISON: 4/22/2021  HISTORY: Thyroid nodule  FINDINGS:   Thyroid parenchyma: homogenous  The right lobe of the thyroid measures: 5.4 x 2.1 x 1.8 cm  The left lobe of the thyroid measures: 4.8 x 1.6 x 1.2  The thyroid isthmus measures: 0.5 cm  Right Lobe:  Nodule 1: (Previously #2 on 4/22/2021)  Location: Mid right lobe  Size: 1.1 x 1.0 x 0.8 cm, previously 1.3 x 1.2 x 0.8 cm on 4/22/2021  Composition: Spongiform (0 points)  Echogenicity: Anechoic (0 points)  Shape: Wider than tall (0 points)  Margin: Smooth (0 points)  Echogenic Foci: None or large comet tail artifact (0 points)  Stability: No significant change in size  TIRADS: TR1 (0 points) Benign  Remaining tiny cystic/spongiform nodules do not meet imaging criteria  based on size.  Left Lobe: No nodule                                                              Impression:  Benign appearing solitary nodule in the right lobe  without significant change.  Remaining tiny cystic nodules do not meet  imaging criteria based on size.     TAY BYERS MD        Again, thank you for allowing me to participate in the care of your patient.      Sincerely,    Cierra Fink MD

## 2025-08-25 ENCOUNTER — LAB (OUTPATIENT)
Dept: LAB | Facility: CLINIC | Age: 60
End: 2025-08-25
Payer: COMMERCIAL

## 2025-08-25 DIAGNOSIS — E04.1 THYROID NODULE: ICD-10-CM

## 2025-08-25 DIAGNOSIS — R63.4 WEIGHT LOSS: ICD-10-CM

## 2025-08-25 DIAGNOSIS — E65 DORSAL CERVICAL FAT PAD: ICD-10-CM

## 2025-08-25 DIAGNOSIS — R45.89 ANXIETY ABOUT HEALTH: ICD-10-CM

## 2025-08-25 DIAGNOSIS — E55.9 HYPOVITAMINOSIS D: ICD-10-CM

## 2025-08-25 DIAGNOSIS — Z78.0 POST-MENOPAUSAL: ICD-10-CM

## 2025-08-25 LAB
CORTIS SERPL-MCNC: 7.8 UG/DL
CRP SERPL-MCNC: <3 MG/L
ERYTHROCYTE [SEDIMENTATION RATE] IN BLOOD BY WESTERGREN METHOD: 2 MM/HR (ref 0–30)
EST. AVERAGE GLUCOSE BLD GHB EST-MCNC: 114 MG/DL
HBA1C MFR BLD: 5.6 % (ref 0–5.6)
PROT SERPL-MCNC: 6.9 G/DL (ref 6.4–8.3)
TSH SERPL DL<=0.005 MIU/L-ACNC: 1.26 UIU/ML (ref 0.3–4.2)
VIT B12 SERPL-MCNC: 409 PG/ML (ref 232–1245)
VIT D+METAB SERPL-MCNC: 27 NG/ML (ref 20–50)

## 2025-08-25 PROCEDURE — 36415 COLL VENOUS BLD VENIPUNCTURE: CPT

## 2025-08-25 PROCEDURE — 83036 HEMOGLOBIN GLYCOSYLATED A1C: CPT

## 2025-08-25 PROCEDURE — 86140 C-REACTIVE PROTEIN: CPT

## 2025-08-25 PROCEDURE — 82607 VITAMIN B-12: CPT

## 2025-08-25 PROCEDURE — 84155 ASSAY OF PROTEIN SERUM: CPT

## 2025-08-25 PROCEDURE — 85652 RBC SED RATE AUTOMATED: CPT

## 2025-08-25 PROCEDURE — 83921 ORGANIC ACID SINGLE QUANT: CPT

## 2025-08-25 PROCEDURE — 82533 TOTAL CORTISOL: CPT

## 2025-08-25 PROCEDURE — 84443 ASSAY THYROID STIM HORMONE: CPT

## 2025-08-25 PROCEDURE — 82024 ASSAY OF ACTH: CPT

## 2025-08-25 PROCEDURE — 82306 VITAMIN D 25 HYDROXY: CPT

## 2025-08-25 PROCEDURE — 84165 PROTEIN E-PHORESIS SERUM: CPT | Performed by: PATHOLOGY

## 2025-08-26 LAB — ACTH PLAS-MCNC: 12 PG/ML

## 2025-08-27 LAB
ALBUMIN SERPL ELPH-MCNC: 4.4 G/DL (ref 3.7–5.1)
ALPHA1 GLOB SERPL ELPH-MCNC: 0.3 G/DL (ref 0.2–0.4)
ALPHA2 GLOB SERPL ELPH-MCNC: 0.7 G/DL (ref 0.5–0.9)
B-GLOBULIN SERPL ELPH-MCNC: 0.8 G/DL (ref 0.6–1)
GAMMA GLOB SERPL ELPH-MCNC: 0.8 G/DL (ref 0.7–1.6)
LOCATION OF TASK: NORMAL
M PROTEIN SERPL ELPH-MCNC: 0 G/DL
PROT PATTERN SERPL ELPH-IMP: NORMAL

## 2025-09-02 LAB — METHYLMALONATE SERPL-SCNC: 0.21 UMOL/L (ref 0–0.4)

## (undated) RX ORDER — LIDOCAINE HYDROCHLORIDE 10 MG/ML
INJECTION, SOLUTION EPIDURAL; INFILTRATION; INTRACAUDAL; PERINEURAL
Status: DISPENSED
Start: 2024-03-06